# Patient Record
Sex: FEMALE | Race: WHITE | NOT HISPANIC OR LATINO | Employment: FULL TIME | URBAN - METROPOLITAN AREA
[De-identification: names, ages, dates, MRNs, and addresses within clinical notes are randomized per-mention and may not be internally consistent; named-entity substitution may affect disease eponyms.]

---

## 2017-02-09 ENCOUNTER — COMMUNICATION - HEALTHEAST (OUTPATIENT)
Dept: FAMILY MEDICINE | Facility: CLINIC | Age: 47
End: 2017-02-09

## 2017-03-17 ENCOUNTER — OFFICE VISIT - HEALTHEAST (OUTPATIENT)
Dept: FAMILY MEDICINE | Facility: CLINIC | Age: 47
End: 2017-03-17

## 2017-03-17 DIAGNOSIS — Z13.228 SCREENING FOR METABOLIC DISORDER: ICD-10-CM

## 2017-03-17 DIAGNOSIS — Z12.4 CERVICAL CANCER SCREENING: ICD-10-CM

## 2017-03-17 DIAGNOSIS — Z00.00 ROUTINE GENERAL MEDICAL EXAMINATION AT A HEALTH CARE FACILITY: ICD-10-CM

## 2017-03-17 DIAGNOSIS — Z13.220 SCREENING FOR HYPERLIPIDEMIA: ICD-10-CM

## 2017-03-17 DIAGNOSIS — Z12.31 VISIT FOR SCREENING MAMMOGRAM: ICD-10-CM

## 2017-03-17 DIAGNOSIS — E55.9 VITAMIN D DEFICIENCY: ICD-10-CM

## 2017-03-17 DIAGNOSIS — R21 RASH: ICD-10-CM

## 2017-03-17 DIAGNOSIS — Z13.0 SCREENING FOR DEFICIENCY ANEMIA: ICD-10-CM

## 2017-03-17 LAB
CHOLEST SERPL-MCNC: 191 MG/DL
FASTING STATUS PATIENT QL REPORTED: YES
HDLC SERPL-MCNC: 52 MG/DL
LDLC SERPL CALC-MCNC: 124 MG/DL
TRIGL SERPL-MCNC: 74 MG/DL

## 2017-03-17 ASSESSMENT — MIFFLIN-ST. JEOR: SCORE: 1261.69

## 2017-03-23 LAB
BKR LAB AP ABNORMAL BLEEDING: NO
BKR LAB AP BIRTH CONTROL/HORMONES: NORMAL
BKR LAB AP CERVICAL APPEARANCE: NORMAL
BKR LAB AP GYN ADEQUACY: NORMAL
BKR LAB AP GYN INTERPRETATION: NORMAL
BKR LAB AP HPV REFLEX: NORMAL
BKR LAB AP LMP: NORMAL
BKR LAB AP PATIENT STATUS: NORMAL
BKR LAB AP PREVIOUS ABNORMAL: NO
BKR LAB AP PREVIOUS NORMAL: 2016
HIGH RISK?: NO
HPV INTERPRETATION - HISTORICAL: NORMAL
HPV INTERPRETER - HISTORICAL: NORMAL
PATH REPORT.COMMENTS IMP SPEC: NORMAL
RESULT FLAG (HE HISTORICAL CONVERSION): NORMAL

## 2017-03-24 ENCOUNTER — COMMUNICATION - HEALTHEAST (OUTPATIENT)
Dept: FAMILY MEDICINE | Facility: CLINIC | Age: 47
End: 2017-03-24

## 2017-03-27 ENCOUNTER — HOSPITAL ENCOUNTER (OUTPATIENT)
Dept: MAMMOGRAPHY | Facility: CLINIC | Age: 47
Discharge: HOME | End: 2017-03-27
Attending: FAMILY MEDICINE

## 2017-03-27 ENCOUNTER — RECORDS - HEALTHEAST (OUTPATIENT)
Dept: ADMINISTRATIVE | Facility: OTHER | Age: 47
End: 2017-03-27

## 2017-03-27 DIAGNOSIS — Z12.31 VISIT FOR SCREENING MAMMOGRAM: ICD-10-CM

## 2017-04-24 ENCOUNTER — COMMUNICATION - HEALTHEAST (OUTPATIENT)
Dept: MAMMOGRAPHY | Facility: CLINIC | Age: 47
End: 2017-04-24

## 2017-04-28 ENCOUNTER — HOSPITAL ENCOUNTER (OUTPATIENT)
Dept: MAMMOGRAPHY | Facility: CLINIC | Age: 47
Discharge: HOME | End: 2017-04-28
Attending: FAMILY MEDICINE

## 2017-04-28 DIAGNOSIS — N64.89 BREAST ASYMMETRY: ICD-10-CM

## 2017-05-19 ENCOUNTER — COMMUNICATION - HEALTHEAST (OUTPATIENT)
Dept: FAMILY MEDICINE | Facility: CLINIC | Age: 47
End: 2017-05-19

## 2017-05-19 DIAGNOSIS — F41.9 ANXIETY: ICD-10-CM

## 2017-06-01 ENCOUNTER — COMMUNICATION - HEALTHEAST (OUTPATIENT)
Dept: MAMMOGRAPHY | Facility: CLINIC | Age: 47
End: 2017-06-01

## 2017-06-01 DIAGNOSIS — F34.1 DYSTHYMIC DISORDER: ICD-10-CM

## 2017-06-04 ENCOUNTER — COMMUNICATION - HEALTHEAST (OUTPATIENT)
Dept: FAMILY MEDICINE | Facility: CLINIC | Age: 47
End: 2017-06-04

## 2017-06-04 DIAGNOSIS — F34.1 DYSTHYMIC DISORDER: ICD-10-CM

## 2017-10-26 ENCOUNTER — COMMUNICATION - HEALTHEAST (OUTPATIENT)
Dept: TELEHEALTH | Facility: CLINIC | Age: 47
End: 2017-10-26

## 2017-10-26 ENCOUNTER — COMMUNICATION - HEALTHEAST (OUTPATIENT)
Dept: HEALTH INFORMATION MANAGEMENT | Facility: CLINIC | Age: 47
End: 2017-10-26

## 2017-10-31 ENCOUNTER — COMMUNICATION - HEALTHEAST (OUTPATIENT)
Dept: TELEHEALTH | Facility: CLINIC | Age: 47
End: 2017-10-31

## 2017-11-03 ENCOUNTER — COMMUNICATION - HEALTHEAST (OUTPATIENT)
Dept: TELEHEALTH | Facility: CLINIC | Age: 47
End: 2017-11-03

## 2017-11-03 ENCOUNTER — COMMUNICATION - HEALTHEAST (OUTPATIENT)
Dept: HEALTH INFORMATION MANAGEMENT | Facility: CLINIC | Age: 47
End: 2017-11-03

## 2018-06-05 ENCOUNTER — APPOINTMENT (OUTPATIENT)
Dept: GENERAL RADIOLOGY | Facility: CLINIC | Age: 48
End: 2018-06-05
Attending: PHYSICIAN ASSISTANT
Payer: COMMERCIAL

## 2018-06-05 ENCOUNTER — RECORDS - HEALTHEAST (OUTPATIENT)
Dept: ADMINISTRATIVE | Facility: OTHER | Age: 48
End: 2018-06-05

## 2018-06-05 ENCOUNTER — HOSPITAL ENCOUNTER (EMERGENCY)
Facility: CLINIC | Age: 48
Discharge: HOME | End: 2018-06-05
Admitting: EMERGENCY MEDICINE
Payer: COMMERCIAL

## 2018-06-05 VITALS
HEART RATE: 70 BPM | TEMPERATURE: 97.8 F | OXYGEN SATURATION: 98 % | RESPIRATION RATE: 14 BRPM | HEIGHT: 62 IN | WEIGHT: 145 LBS | DIASTOLIC BLOOD PRESSURE: 99 MMHG | SYSTOLIC BLOOD PRESSURE: 157 MMHG | BODY MASS INDEX: 26.68 KG/M2

## 2018-06-05 DIAGNOSIS — S62.631A CLOSED DISPLACED FRACTURE OF DISTAL PHALANX OF LEFT INDEX FINGER, INITIAL ENCOUNTER: ICD-10-CM

## 2018-06-05 PROCEDURE — 26750 TREAT FINGER FRACTURE EACH: CPT | Mod: F1

## 2018-06-05 PROCEDURE — 25000132 ZZH RX MED GY IP 250 OP 250 PS 637: Performed by: PHYSICIAN ASSISTANT

## 2018-06-05 PROCEDURE — 99284 EMERGENCY DEPT VISIT MOD MDM: CPT | Mod: 25

## 2018-06-05 PROCEDURE — 73140 X-RAY EXAM OF FINGER(S): CPT | Mod: LT

## 2018-06-05 RX ORDER — IBUPROFEN 600 MG/1
600 TABLET, FILM COATED ORAL ONCE
Status: COMPLETED | OUTPATIENT
Start: 2018-06-05 | End: 2018-06-05

## 2018-06-05 RX ADMIN — IBUPROFEN 600 MG: 600 TABLET ORAL at 22:53

## 2018-06-05 ASSESSMENT — ENCOUNTER SYMPTOMS
JOINT SWELLING: 1
ARTHRALGIAS: 1

## 2018-06-05 NOTE — ED AVS SNAPSHOT
Emergency Department    [address] [place] [address]    Phone:  [phone]    Fax:  [phone]                                       Adela Stone   MRN: [id number]    Department:   Emergency Department   Date of Visit:  6/5/2018           After Visit Summary Signature Page     I have received my discharge instructions, and my questions have been answered. I have discussed any challenges I see with this plan with the nurse or doctor.    ..........................................................................................................................................  Patient/Patient Representative Signature      ..........................................................................................................................................  Patient Representative Print Name and Relationship to Patient    ..................................................               ................................................  Date                                            Time    ..........................................................................................................................................  Reviewed by Signature/Title    ...................................................              ..............................................  Date                                                            Time

## 2018-06-05 NOTE — ED AVS SNAPSHOT
Emergency Department    [address] [facility] [address]    Phone:  [phone]    Fax:  [phone]                                       Adela Stone   MRN: [id number]    Department:   Emergency Department   Date of Visit:  6/5/2018           Patient Information     Date Of Birth          1970        Your diagnoses for this visit were:     Closed displaced fracture of distal phalanx of left index finger, initial encounter       Follow-up Information     Follow up with Primary care provider  In 2 weeks.        Follow up with  Emergency Department.    Specialty:  EMERGENCY MEDICINE    Why:  As needed, If symptoms worsen    Contact information:    [address] [facility] [address] [phone]        Discharge Instructions       *Follow up with primary care provider in 2 weeks.   *Wear finger immobilizer until follow up with primary.       Finger or Toe Fractures (Broken Finger or Toe)  A hard blow can break a bone in your toe or finger. Broken bones are also known as fractures. Even minor fractures need medical care. Without treatment, they may heal crooked, remain stiff, or develop other problems.    When to go to the Emergency Room (ER)  You may not always know when you have a fractured toe or finger. Apply ice to the injury right away. Then, seek medical care if:    Your finger or toe is swollen or very painful.    You cannot move your finger or toe normally.    Your injured toe or finger is pale or blue.    You are bleeding.    A bone protrudes through your skin.  What to expect in the ER  A healthcare provider will ask about your injury and examine your toe or finger. You may have X-rays. Treatment will depend on the type of fracture you have.  Toe fracture  Your healthcare provider may straighten a broken toe. You'll be given local anesthesia so you won't feel any discomfort during this procedure. Your injured toe may then be splinted by being taped to a  toe next to it, or placed on a pad that's taped to your foot. Your healthcare provider may also ask you to apply ice and keep your foot elevated.  Finger fracture  Your healthcare provider may straighten a broken finger. A broken finger is likely to be placed in a metal splint. This helps straighten and protect the finger while it heals. Your healthcare provider may give you exercises to do as your injury heals, to prevent stiffness in your finger.  Date Last Reviewed: 9/28/2015 2000-2017 [employer]. [address] [place] [address]. All rights reserved. This information is not intended as a substitute for professional medical care. Always follow your healthcare professional's instructions.          24 Hour Appointment Hotline       To make an appointment at any [facility], call [phone] ([phone]). If you don't have a family doctor or clinic, we will help you find one. [facility] are conveniently located to serve the needs of you and your family.             Review of your medicines      Our records show that you are taking the medicines listed below. If these are incorrect, please call your family doctor or clinic.        Dose / Directions Last dose taken    CITALOPRAM HYDROBROMIDE PO        Refills:  0                Procedures and tests performed during your visit     Fingers XR, 2-3 views, left      Orders Needing Specimen Collection     None      Pending Results     No orders found from 6/3/2018 to 6/6/2018.            Pending Culture Results     No orders found from 6/3/2018 to 6/6/2018.            Pending Results Instructions     If you had any lab results that were not finalized at the time of your Discharge, you can call the ED Lab Result RN at [phone]. You will be contacted by this team for any positive Lab results or changes in treatment. The nurses are available 7 days a week from 10A to 6:30P.  You can leave a message 24 hours per day and they will  return your call.        Test Results From Your Hospital Stay        6/5/2018 10:40 PM      Narrative     FINGER LEFT TWO OR MORE VIEWS    6/5/2018 10:19 PM     HISTORY: Pain,     COMPARISON: None.    FINDINGS: Minimally displaced fracture of the base of the distal  phalanx of the left second finger.        Impression     IMPRESSION: Distal phalanx fracture left second finger.    ANGELA COPE MD                Clinical Quality Measure: Blood Pressure Screening     Your blood pressure was checked while you were in the emergency department today. The last reading we obtained was  BP: (!) 157/99 . Please read the guidelines below about what these numbers mean and what you should do about them.  If your systolic blood pressure (the top number) is less than 120 and your diastolic blood pressure (the bottom number) is less than 80, then your blood pressure is normal. There is nothing more that you need to do about it.  If your systolic blood pressure (the top number) is 120-139 or your diastolic blood pressure (the bottom number) is 80-89, your blood pressure may be higher than it should be. You should have your blood pressure rechecked within a year by a primary care provider.  If your systolic blood pressure (the top number) is 140 or greater or your diastolic blood pressure (the bottom number) is 90 or greater, you may have high blood pressure. High blood pressure is treatable, but if left untreated over time it can put you at risk for heart attack, stroke, or kidney failure. You should have your blood pressure rechecked by a primary care provider within the next 4 weeks.  If your provider in the emergency department today gave you specific instructions to follow-up with your doctor or provider even sooner than that, you should follow that instruction and not wait for up to 4 weeks for your follow-up visit.        Thank you for choosing [facility]       Thank you for choosing [facility] for your care. Our goal is  "always to provide you with excellent care. Hearing back from our patients is one way we can continue to improve our services. Please take a few minutes to complete the written survey that you may receive in the mail after you visit with us. Thank you!        Spreadknowledge Information     Spreadknowledge lets you send messages to your doctor, view your test results, renew your prescriptions, schedule appointments and more. To sign up, go to [email or web address] . Click on \"Log in\" on the left side of the screen, which will take you to the Welcome page. Then click on \"Sign up Now\" on the right side of the page.     You will be asked to enter the access code listed below, as well as some personal information. Please follow the directions to create your username and password.     Your access code is: [email or web address]  Expires: 9/3/2018 11:03 PM     Your access code will  in 90 days. If you need help or a new code, please call your [facility] clinic or [phone].        Care EveryWhere ID     This is your Care EveryWhere ID. This could be used by other organizations to access your [facility] medical records  [id number]        Equal Access to Services     JESSICA HOWARD : Hadii maki Beavers, wamarcellada winston, qaweston kaalmacarmina ag, stephie gresham. So [facility] [phone].    ATENCIÓN: Si habla español, tiene a yarbrough disposición servicios gratuitos de asistencia lingüística. Martha al [phone].    We comply with applicable federal civil rights laws and [place] laws. We do not discriminate on the basis of race, color, national origin, age, disability, sex, sexual orientation, or gender identity.            After Visit Summary       This is your record. Keep this with you and show to your community pharmacist(s) and doctor(s) at your next visit.                  "

## 2018-06-06 NOTE — ED PROVIDER NOTES
"  History     Chief Complaint:  Left hand injury    HPI   Adela Stone is a 48 year old female who presents with an injury to her left index finger. The patient states that she was playing catch with her son when the ball collided directly with the tip of her left index finger. The patient states that when she attempted to bend her finger she felt a pop and that her finger appears slightly crooked. The patient has difficulty bending at the finger tip and notes some swelling. She otherwise denies numbness and has limited range of motion due to swelling.    Allergies:  No known drug allergies     Medications:    Citalopram hydrobromide     Past Medical History:    The patient does not have any past pertinent medical history.    Past Surgical History:    History reviewed. No pertinent surgical history.    Family History:    History reviewed. No pertinent family history.     Social History:  Smoking Status: Never smoker  Alcohol Use: No  Patient presents on her own.    Review of Systems   Musculoskeletal: Positive for arthralgias and joint swelling.   All other systems reviewed and are negative.    Physical Exam   Patient Vitals for the past 24 hrs:   BP Temp Temp src Pulse Heart Rate Resp SpO2 Height Weight   06/05/18 2134 (!) 157/99 97.8  F (36.6  C) Oral 70 70 14 98 % 1.575 m (5' 2\") 65.8 kg (145 lb)       Physical Exam  Constitutional: Alert, attentive, GCS 15   CV: 2+ radial pulse, brisk distal cap refill to left index finger.   MSK:  Able to flex/extend PIP joint of left index finger, unable to flex/extend DIP joint secondary to pain. Localized tenderness overlying the DIP joint   Neurological: Alert, attentive. 5/5 strength to PIP joint, unable to assess DIP joint secondary to pain. Sensation intact to distal fingertip.   Skin: Skin is warm and dry. No open wound.,     Emergency Department Course     Imaging:  Radiographic findings were communicated with the patient who voiced understanding of the " findings.    X-ray Left fingers, 2-3 views:  Distal phalanx fracture left second finger.  Result per radiology.      Interventions:  2253 - Ibuprofen 600 mg PO    Emergency Department Course:  Past medical records, nursing notes, and vitals reviewed.  2139: I performed an exam of the patient and obtained history, as documented above.     2208: I discussed the patient with Dr. Bah, in shared service, who also evaluated the patient.     2303:  I rechecked the patient. Findings and plan explained to the Patient. Patient discharged home with instructions regarding supportive care, medications, and reasons to return. The importance of close follow-up was reviewed.      Impression & Plan      Medical Decision Making:  Adela Stone is a 48 year old female who presents for evaluation of left pointer finger pain after she was attempting to catch a baseball and this collided directly with the finger. She had pain, swelling, and apparent deformity to the finger and comes here. CMS is intact distally in the extremity.  Xrays reveal a minimally displaced fracture of the distal phalanx that does not need reduction at this time. I therefore staffed the patient in shared service with Dr. Bah who agreed with the plan for aluma-foam splint and follow up with her primary and/or orthopedics.  There is no indication for ortho consultation from the ED. Rather, close follow-up is indicated in the next 2 weeks. All questions/concerns addressed prior to discharge home.     Diagnosis:    ICD-10-CM    1. Closed displaced fracture of distal phalanx of left index finger, initial encounter S62.631A        Renay Gomes  6/5/2018    EMERGENCY DEPARTMENT  I, Renay Gomes, am serving as a scribe at 9:39 PM on 6/5/2018 to document services personally performed by Dyana Zuniga PA-C based on my observations and the provider's statements to me.       Dyana Zuniga PA-C  06/05/18 1920

## 2018-06-06 NOTE — DISCHARGE INSTRUCTIONS
*Follow up with primary care provider in 2 weeks.   *Wear finger immobilizer until follow up with primary.       Finger or Toe Fractures (Broken Finger or Toe)  A hard blow can break a bone in your toe or finger. Broken bones are also known as fractures. Even minor fractures need medical care. Without treatment, they may heal crooked, remain stiff, or develop other problems.    When to go to the Emergency Room (ER)  You may not always know when you have a fractured toe or finger. Apply ice to the injury right away. Then, seek medical care if:    Your finger or toe is swollen or very painful.    You cannot move your finger or toe normally.    Your injured toe or finger is pale or blue.    You are bleeding.    A bone protrudes through your skin.  What to expect in the ER  A healthcare provider will ask about your injury and examine your toe or finger. You may have X-rays. Treatment will depend on the type of fracture you have.  Toe fracture  Your healthcare provider may straighten a broken toe. You'll be given local anesthesia so you won't feel any discomfort during this procedure. Your injured toe may then be splinted by being taped to a toe next to it, or placed on a pad that's taped to your foot. Your healthcare provider may also ask you to apply ice and keep your foot elevated.  Finger fracture  Your healthcare provider may straighten a broken finger. A broken finger is likely to be placed in a metal splint. This helps straighten and protect the finger while it heals. Your healthcare provider may give you exercises to do as your injury heals, to prevent stiffness in your finger.  Date Last Reviewed: 9/28/2015 2000-2017 [employer]. [address] [place] [address], [place] [address]. All rights reserved. This information is not intended as a substitute for professional medical care. Always follow your healthcare professional's instructions.

## 2018-06-06 NOTE — ED PROVIDER NOTES
Emergency Department Attending Supervision Note  6/5/2018  11:00 PM      I evaluated this patient in conjunction with CHRISTINE Clark    On exam, patient has pain and tenderness to the base of the left index finger. No bruising, no open wounds.     Patient was seen in association with CHRISTINE Clark. Patient presents with finger pain after direct trauma with a softball. X-ray shows fracture as noted. Patient has limited range of motion secondary to pain. Patient was placed in splint and discussed follow up.     Diagnosis    ICD-10-CM    1. Closed displaced fracture of distal phalanx of left index finger, initial encounter S62.631A          Fly Bah MD

## 2018-06-07 ENCOUNTER — COMMUNICATION - HEALTHEAST (OUTPATIENT)
Dept: FAMILY MEDICINE | Facility: CLINIC | Age: 48
End: 2018-06-07

## 2018-06-07 DIAGNOSIS — F34.1 DYSTHYMIC DISORDER: ICD-10-CM

## 2018-06-21 ENCOUNTER — OFFICE VISIT - HEALTHEAST (OUTPATIENT)
Dept: FAMILY MEDICINE | Facility: CLINIC | Age: 48
End: 2018-06-21

## 2018-06-21 ENCOUNTER — RECORDS - HEALTHEAST (OUTPATIENT)
Dept: GENERAL RADIOLOGY | Facility: CLINIC | Age: 48
End: 2018-06-21

## 2018-06-21 DIAGNOSIS — S62.609A FRACTURE OF UNSPECIFIED PHALANX OF UNSPECIFIED FINGER, INITIAL ENCOUNTER FOR CLOSED FRACTURE: ICD-10-CM

## 2018-06-21 DIAGNOSIS — F41.9 ANXIETY: ICD-10-CM

## 2018-06-21 DIAGNOSIS — S62.631G: ICD-10-CM

## 2018-06-21 DIAGNOSIS — Z12.31 VISIT FOR SCREENING MAMMOGRAM: ICD-10-CM

## 2018-06-21 ASSESSMENT — MIFFLIN-ST. JEOR: SCORE: 1256.69

## 2018-06-25 ENCOUNTER — RECORDS - HEALTHEAST (OUTPATIENT)
Dept: ADMINISTRATIVE | Facility: OTHER | Age: 48
End: 2018-06-25

## 2018-06-26 ENCOUNTER — RECORDS - HEALTHEAST (OUTPATIENT)
Dept: ADMINISTRATIVE | Facility: OTHER | Age: 48
End: 2018-06-26

## 2018-06-29 ENCOUNTER — HOSPITAL ENCOUNTER (OUTPATIENT)
Dept: MAMMOGRAPHY | Facility: CLINIC | Age: 48
Discharge: HOME | End: 2018-06-29
Attending: FAMILY MEDICINE

## 2018-06-29 DIAGNOSIS — Z12.31 VISIT FOR SCREENING MAMMOGRAM: ICD-10-CM

## 2018-07-06 ENCOUNTER — RECORDS - HEALTHEAST (OUTPATIENT)
Dept: ADMINISTRATIVE | Facility: OTHER | Age: 48
End: 2018-07-06

## 2018-07-20 ENCOUNTER — RECORDS - HEALTHEAST (OUTPATIENT)
Dept: ADMINISTRATIVE | Facility: OTHER | Age: 48
End: 2018-07-20

## 2018-07-30 ENCOUNTER — RECORDS - HEALTHEAST (OUTPATIENT)
Dept: ADMINISTRATIVE | Facility: OTHER | Age: 48
End: 2018-07-30
Payer: COMMERCIAL

## 2018-07-30 ENCOUNTER — OFFICE VISIT - HEALTHEAST (OUTPATIENT)
Dept: FAMILY MEDICINE | Facility: CLINIC | Age: 48
End: 2018-07-30

## 2018-07-30 DIAGNOSIS — Z13.228 SCREENING FOR METABOLIC DISORDER: ICD-10-CM

## 2018-07-30 DIAGNOSIS — Z12.4 SCREENING FOR CERVICAL CANCER: ICD-10-CM

## 2018-07-30 DIAGNOSIS — E55.9 VITAMIN D DEFICIENCY: ICD-10-CM

## 2018-07-30 DIAGNOSIS — Z00.00 ROUTINE GENERAL MEDICAL EXAMINATION AT A HEALTH CARE FACILITY: ICD-10-CM

## 2018-07-30 DIAGNOSIS — F41.1 GENERALIZED ANXIETY DISORDER: ICD-10-CM

## 2018-07-30 LAB
ANION GAP SERPL CALCULATED.3IONS-SCNC: 9 MMOL/L (ref 5–18)
BUN SERPL-MCNC: 13 MG/DL (ref 8–22)
CALCIUM SERPL-MCNC: 9.4 MG/DL (ref 8.5–10.5)
CHLORIDE BLD-SCNC: 105 MMOL/L (ref 98–107)
CHOLEST SERPL-MCNC: 199 MG/DL
CO2 SERPL-SCNC: 25 MMOL/L (ref 22–31)
CREAT SERPL-MCNC: 0.76 MG/DL (ref 0.6–1.1)
FASTING STATUS PATIENT QL REPORTED: YES
GFR SERPL CREATININE-BSD FRML MDRD: >60 ML/MIN/1.73M2
GLUCOSE BLD-MCNC: 95 MG/DL (ref 70–125)
HDLC SERPL-MCNC: 60 MG/DL
LDLC SERPL CALC-MCNC: 127 MG/DL
POTASSIUM BLD-SCNC: 4.5 MMOL/L (ref 3.5–5)
SODIUM SERPL-SCNC: 139 MMOL/L (ref 136–145)
TRIGL SERPL-MCNC: 58 MG/DL
TSH SERPL DL<=0.005 MIU/L-ACNC: 1.32 UIU/ML (ref 0.3–5)

## 2018-07-30 ASSESSMENT — MIFFLIN-ST. JEOR: SCORE: 1261.22

## 2018-07-31 ENCOUNTER — COMMUNICATION - HEALTHEAST (OUTPATIENT)
Dept: FAMILY MEDICINE | Facility: CLINIC | Age: 48
End: 2018-07-31

## 2018-07-31 LAB
25(OH)D3 SERPL-MCNC: 24 NG/ML (ref 30–80)
HPV SOURCE: NORMAL
HUMAN PAPILLOMA VIRUS 16 DNA: NEGATIVE
HUMAN PAPILLOMA VIRUS 18 DNA: NEGATIVE
HUMAN PAPILLOMA VIRUS FINAL DIAGNOSIS: NORMAL
HUMAN PAPILLOMA VIRUS OTHER HR: NEGATIVE
SPECIMEN DESCRIPTION: NORMAL

## 2018-08-12 ENCOUNTER — RECORDS - HEALTHEAST (OUTPATIENT)
Dept: ADMINISTRATIVE | Facility: OTHER | Age: 48
End: 2018-08-12

## 2018-08-13 ENCOUNTER — RECORDS - HEALTHEAST (OUTPATIENT)
Dept: ADMINISTRATIVE | Facility: OTHER | Age: 48
End: 2018-08-13

## 2018-08-15 ENCOUNTER — COMMUNICATION - HEALTHEAST (OUTPATIENT)
Dept: FAMILY MEDICINE | Facility: CLINIC | Age: 48
End: 2018-08-15

## 2018-09-02 ENCOUNTER — COMMUNICATION - HEALTHEAST (OUTPATIENT)
Dept: FAMILY MEDICINE | Facility: CLINIC | Age: 48
End: 2018-09-02

## 2018-09-02 DIAGNOSIS — F41.1 GENERALIZED ANXIETY DISORDER: ICD-10-CM

## 2018-09-10 ENCOUNTER — RECORDS - HEALTHEAST (OUTPATIENT)
Dept: ADMINISTRATIVE | Facility: OTHER | Age: 48
End: 2018-09-10

## 2018-10-01 ENCOUNTER — COMMUNICATION - HEALTHEAST (OUTPATIENT)
Dept: FAMILY MEDICINE | Facility: CLINIC | Age: 48
End: 2018-10-01

## 2018-10-01 DIAGNOSIS — F41.1 GENERALIZED ANXIETY DISORDER: ICD-10-CM

## 2018-10-10 ENCOUNTER — COMMUNICATION - HEALTHEAST (OUTPATIENT)
Dept: FAMILY MEDICINE | Facility: CLINIC | Age: 48
End: 2018-10-10

## 2018-10-10 DIAGNOSIS — F41.9 ANXIETY: ICD-10-CM

## 2018-10-16 ENCOUNTER — COMMUNICATION - HEALTHEAST (OUTPATIENT)
Dept: HEALTH INFORMATION MANAGEMENT | Facility: CLINIC | Age: 48
End: 2018-10-16

## 2018-10-16 ENCOUNTER — COMMUNICATION - HEALTHEAST (OUTPATIENT)
Dept: TELEHEALTH | Facility: CLINIC | Age: 48
End: 2018-10-16

## 2019-03-18 ENCOUNTER — OFFICE VISIT - HEALTHEAST (OUTPATIENT)
Dept: FAMILY MEDICINE | Facility: CLINIC | Age: 49
End: 2019-03-18

## 2019-03-18 DIAGNOSIS — F41.1 GENERALIZED ANXIETY DISORDER: ICD-10-CM

## 2019-03-18 ASSESSMENT — MIFFLIN-ST. JEOR: SCORE: 1279.37

## 2019-04-15 ENCOUNTER — COMMUNICATION - HEALTHEAST (OUTPATIENT)
Dept: FAMILY MEDICINE | Facility: CLINIC | Age: 49
End: 2019-04-15

## 2019-04-25 ENCOUNTER — COMMUNICATION - HEALTHEAST (OUTPATIENT)
Dept: FAMILY MEDICINE | Facility: CLINIC | Age: 49
End: 2019-04-25

## 2019-06-12 ENCOUNTER — RECORDS - HEALTHEAST (OUTPATIENT)
Dept: GENERAL RADIOLOGY | Facility: CLINIC | Age: 49
End: 2019-06-12

## 2019-06-12 ENCOUNTER — OFFICE VISIT - HEALTHEAST (OUTPATIENT)
Dept: FAMILY MEDICINE | Facility: CLINIC | Age: 49
End: 2019-06-12

## 2019-06-12 DIAGNOSIS — M79.622 PAIN OF LEFT UPPER ARM: ICD-10-CM

## 2019-06-12 DIAGNOSIS — M79.622 PAIN IN LEFT UPPER ARM: ICD-10-CM

## 2019-06-12 ASSESSMENT — MIFFLIN-ST. JEOR: SCORE: 1283.9

## 2019-06-14 ENCOUNTER — COMMUNICATION - HEALTHEAST (OUTPATIENT)
Dept: FAMILY MEDICINE | Facility: CLINIC | Age: 49
End: 2019-06-14

## 2019-06-14 ENCOUNTER — AMBULATORY - HEALTHEAST (OUTPATIENT)
Dept: FAMILY MEDICINE | Facility: CLINIC | Age: 49
End: 2019-06-14

## 2019-06-14 DIAGNOSIS — M79.622 PAIN OF LEFT UPPER ARM: ICD-10-CM

## 2019-06-21 ENCOUNTER — OFFICE VISIT - HEALTHEAST (OUTPATIENT)
Dept: PHYSICAL THERAPY | Facility: REHABILITATION | Age: 49
End: 2019-06-21

## 2019-06-21 DIAGNOSIS — R29.3 ABNORMAL POSTURE: ICD-10-CM

## 2019-06-21 DIAGNOSIS — M54.12 LEFT CERVICAL RADICULOPATHY: ICD-10-CM

## 2019-06-21 DIAGNOSIS — R29.898 DECREASED ROM OF NECK: ICD-10-CM

## 2019-06-26 ENCOUNTER — OFFICE VISIT - HEALTHEAST (OUTPATIENT)
Dept: PHYSICAL THERAPY | Facility: REHABILITATION | Age: 49
End: 2019-06-26

## 2019-06-26 DIAGNOSIS — M54.12 LEFT CERVICAL RADICULOPATHY: ICD-10-CM

## 2019-06-26 DIAGNOSIS — R29.898 DECREASED ROM OF NECK: ICD-10-CM

## 2019-06-26 DIAGNOSIS — R29.3 ABNORMAL POSTURE: ICD-10-CM

## 2019-07-03 ENCOUNTER — OFFICE VISIT - HEALTHEAST (OUTPATIENT)
Dept: PHYSICAL THERAPY | Facility: REHABILITATION | Age: 49
End: 2019-07-03

## 2019-07-03 DIAGNOSIS — R29.3 ABNORMAL POSTURE: ICD-10-CM

## 2019-07-03 DIAGNOSIS — M54.12 LEFT CERVICAL RADICULOPATHY: ICD-10-CM

## 2019-07-03 DIAGNOSIS — R29.898 DECREASED ROM OF NECK: ICD-10-CM

## 2019-07-16 ENCOUNTER — COMMUNICATION - HEALTHEAST (OUTPATIENT)
Dept: FAMILY MEDICINE | Facility: CLINIC | Age: 49
End: 2019-07-16

## 2019-07-16 DIAGNOSIS — F41.9 ANXIETY: ICD-10-CM

## 2019-09-09 ENCOUNTER — COMMUNICATION - HEALTHEAST (OUTPATIENT)
Dept: FAMILY MEDICINE | Facility: CLINIC | Age: 49
End: 2019-09-09

## 2019-09-09 ENCOUNTER — OFFICE VISIT - HEALTHEAST (OUTPATIENT)
Dept: FAMILY MEDICINE | Facility: CLINIC | Age: 49
End: 2019-09-09

## 2019-09-09 DIAGNOSIS — Z00.00 ROUTINE GENERAL MEDICAL EXAMINATION AT A HEALTH CARE FACILITY: ICD-10-CM

## 2019-09-09 DIAGNOSIS — Z12.31 ENCOUNTER FOR SCREENING MAMMOGRAM FOR BREAST CANCER: ICD-10-CM

## 2019-09-09 DIAGNOSIS — Z12.11 SCREEN FOR COLON CANCER: ICD-10-CM

## 2019-09-09 DIAGNOSIS — Z13.228 ENCOUNTER FOR SCREENING FOR OTHER METABOLIC DISORDERS: ICD-10-CM

## 2019-09-09 DIAGNOSIS — F41.9 ANXIETY: ICD-10-CM

## 2019-09-09 LAB
ANION GAP SERPL CALCULATED.3IONS-SCNC: 6 MMOL/L (ref 5–18)
BUN SERPL-MCNC: 14 MG/DL (ref 8–22)
CALCIUM SERPL-MCNC: 9.3 MG/DL (ref 8.5–10.5)
CHLORIDE BLD-SCNC: 106 MMOL/L (ref 98–107)
CHOLEST SERPL-MCNC: 181 MG/DL
CO2 SERPL-SCNC: 28 MMOL/L (ref 22–31)
CREAT SERPL-MCNC: 0.76 MG/DL (ref 0.6–1.1)
FASTING STATUS PATIENT QL REPORTED: YES
GFR SERPL CREATININE-BSD FRML MDRD: >60 ML/MIN/1.73M2
GLUCOSE BLD-MCNC: 95 MG/DL (ref 70–125)
HDLC SERPL-MCNC: 58 MG/DL
LDLC SERPL CALC-MCNC: 110 MG/DL
POTASSIUM BLD-SCNC: 4.7 MMOL/L (ref 3.5–5)
SODIUM SERPL-SCNC: 140 MMOL/L (ref 136–145)
TRIGL SERPL-MCNC: 66 MG/DL

## 2019-09-09 ASSESSMENT — MIFFLIN-ST. JEOR: SCORE: 1269.39

## 2019-09-11 ENCOUNTER — COMMUNICATION - HEALTHEAST (OUTPATIENT)
Dept: FAMILY MEDICINE | Facility: CLINIC | Age: 49
End: 2019-09-11

## 2019-09-20 ENCOUNTER — COMMUNICATION - HEALTHEAST (OUTPATIENT)
Dept: FAMILY MEDICINE | Facility: CLINIC | Age: 49
End: 2019-09-20

## 2019-09-30 ENCOUNTER — COMMUNICATION - HEALTHEAST (OUTPATIENT)
Dept: FAMILY MEDICINE | Facility: CLINIC | Age: 49
End: 2019-09-30

## 2019-10-16 ENCOUNTER — COMMUNICATION - HEALTHEAST (OUTPATIENT)
Dept: HEALTH INFORMATION MANAGEMENT | Facility: CLINIC | Age: 49
End: 2019-10-16

## 2019-10-22 ENCOUNTER — HOSPITAL ENCOUNTER (EMERGENCY)
Facility: CLINIC | Age: 49
Discharge: HOME | End: 2019-10-22
Attending: EMERGENCY MEDICINE | Admitting: EMERGENCY MEDICINE
Payer: COMMERCIAL

## 2019-10-22 ENCOUNTER — RECORDS - HEALTHEAST (OUTPATIENT)
Dept: ADMINISTRATIVE | Facility: OTHER | Age: 49
End: 2019-10-22

## 2019-10-22 ENCOUNTER — APPOINTMENT (OUTPATIENT)
Dept: CARDIOLOGY | Facility: CLINIC | Age: 49
End: 2019-10-22
Attending: EMERGENCY MEDICINE
Payer: COMMERCIAL

## 2019-10-22 ENCOUNTER — COMMUNICATION - HEALTHEAST (OUTPATIENT)
Dept: SCHEDULING | Facility: CLINIC | Age: 49
End: 2019-10-22

## 2019-10-22 ENCOUNTER — APPOINTMENT (OUTPATIENT)
Dept: GENERAL RADIOLOGY | Facility: CLINIC | Age: 49
End: 2019-10-22
Attending: EMERGENCY MEDICINE
Payer: COMMERCIAL

## 2019-10-22 VITALS
TEMPERATURE: 98.9 F | DIASTOLIC BLOOD PRESSURE: 94 MMHG | SYSTOLIC BLOOD PRESSURE: 132 MMHG | HEART RATE: 78 BPM | OXYGEN SATURATION: 98 % | RESPIRATION RATE: 16 BRPM | BODY MASS INDEX: 29.26 KG/M2 | WEIGHT: 159 LBS | HEIGHT: 62 IN

## 2019-10-22 DIAGNOSIS — R00.2 PALPITATIONS: ICD-10-CM

## 2019-10-22 LAB
ALBUMIN SERPL-MCNC: 3.8 G/DL (ref 3.4–5)
ALP SERPL-CCNC: 64 U/L (ref 40–150)
ALT SERPL W P-5'-P-CCNC: 44 U/L (ref 0–50)
ANION GAP SERPL CALCULATED.3IONS-SCNC: 2 MMOL/L (ref 3–14)
AST SERPL W P-5'-P-CCNC: 21 U/L (ref 0–45)
BASOPHILS # BLD AUTO: 0 10E9/L (ref 0–0.2)
BASOPHILS NFR BLD AUTO: 0.3 %
BILIRUB SERPL-MCNC: 0.5 MG/DL (ref 0.2–1.3)
BUN SERPL-MCNC: 8 MG/DL (ref 7–30)
CALCIUM SERPL-MCNC: 9.2 MG/DL (ref 8.5–10.1)
CHLORIDE SERPL-SCNC: 109 MMOL/L (ref 94–109)
CO2 SERPL-SCNC: 28 MMOL/L (ref 20–32)
CREAT SERPL-MCNC: 0.69 MG/DL (ref 0.52–1.04)
DIFFERENTIAL METHOD BLD: NORMAL
EOSINOPHIL # BLD AUTO: 0.1 10E9/L (ref 0–0.7)
EOSINOPHIL NFR BLD AUTO: 1.2 %
ERYTHROCYTE [DISTWIDTH] IN BLOOD BY AUTOMATED COUNT: 12.8 % (ref 10–15)
GFR SERPL CREATININE-BSD FRML MDRD: >90 ML/MIN/{1.73_M2}
GLUCOSE SERPL-MCNC: 88 MG/DL (ref 70–99)
HCT VFR BLD AUTO: 40.1 % (ref 35–47)
HGB BLD-MCNC: 13.5 G/DL (ref 11.7–15.7)
IMM GRANULOCYTES # BLD: 0 10E9/L (ref 0–0.4)
IMM GRANULOCYTES NFR BLD: 0.2 %
INTERPRETATION ECG - MUSE: NORMAL
LYMPHOCYTES # BLD AUTO: 2 10E9/L (ref 0.8–5.3)
LYMPHOCYTES NFR BLD AUTO: 23.3 %
MCH RBC QN AUTO: 31 PG (ref 26.5–33)
MCHC RBC AUTO-ENTMCNC: 33.7 G/DL (ref 31.5–36.5)
MCV RBC AUTO: 92 FL (ref 78–100)
MONOCYTES # BLD AUTO: 0.6 10E9/L (ref 0–1.3)
MONOCYTES NFR BLD AUTO: 6.7 %
NEUTROPHILS # BLD AUTO: 5.9 10E9/L (ref 1.6–8.3)
NEUTROPHILS NFR BLD AUTO: 68.3 %
NRBC # BLD AUTO: 0 10*3/UL
NRBC BLD AUTO-RTO: 0 /100
PLATELET # BLD AUTO: 234 10E9/L (ref 150–450)
POTASSIUM SERPL-SCNC: 4.5 MMOL/L (ref 3.4–5.3)
PROT SERPL-MCNC: 7.7 G/DL (ref 6.8–8.8)
RBC # BLD AUTO: 4.36 10E12/L (ref 3.8–5.2)
SODIUM SERPL-SCNC: 139 MMOL/L (ref 133–144)
TROPONIN I SERPL-MCNC: <0.015 UG/L (ref 0–0.04)
TSH SERPL DL<=0.005 MIU/L-ACNC: 1.39 MU/L (ref 0.4–4)
WBC # BLD AUTO: 8.6 10E9/L (ref 4–11)

## 2019-10-22 PROCEDURE — 93227 XTRNL ECG REC<48 HR R&I: CPT | Performed by: INTERNAL MEDICINE

## 2019-10-22 PROCEDURE — 93005 ELECTROCARDIOGRAM TRACING: CPT | Mod: 59

## 2019-10-22 PROCEDURE — 71046 X-RAY EXAM CHEST 2 VIEWS: CPT

## 2019-10-22 PROCEDURE — 84443 ASSAY THYROID STIM HORMONE: CPT | Performed by: EMERGENCY MEDICINE

## 2019-10-22 PROCEDURE — 85025 COMPLETE CBC W/AUTO DIFF WBC: CPT | Performed by: EMERGENCY MEDICINE

## 2019-10-22 PROCEDURE — 93226 XTRNL ECG REC<48 HR SCAN A/R: CPT

## 2019-10-22 PROCEDURE — 99285 EMERGENCY DEPT VISIT HI MDM: CPT | Mod: 25

## 2019-10-22 PROCEDURE — 84484 ASSAY OF TROPONIN QUANT: CPT | Performed by: EMERGENCY MEDICINE

## 2019-10-22 PROCEDURE — 80053 COMPREHEN METABOLIC PANEL: CPT | Performed by: EMERGENCY MEDICINE

## 2019-10-22 RX ORDER — DESVENLAFAXINE 50 MG/1
50 TABLET, FILM COATED, EXTENDED RELEASE ORAL
COMMUNITY
Start: 2019-09-09 | End: 2022-02-16

## 2019-10-22 RX ORDER — LORAZEPAM 0.5 MG/1
0.5 TABLET ORAL
COMMUNITY
Start: 2019-09-09 | End: 2023-01-26

## 2019-10-22 ASSESSMENT — ENCOUNTER SYMPTOMS
NAUSEA: 1
DYSURIA: 0
COUGH: 0
FEVER: 0
NERVOUS/ANXIOUS: 1
HEMATURIA: 0

## 2019-10-22 ASSESSMENT — MIFFLIN-ST. JEOR: SCORE: 1294.47

## 2019-10-22 NOTE — ED PROVIDER NOTES
"  History     Chief Complaint:  Tachycardia and Anxiety      HPI   Adela Stone is a 49 year old female with a history of generalized anxiety disorder who presents with heightened anxiety. She reports that she has a history of panic attacks and is prescribed Lorazepam for this. She reports that she usually has about one panic attack per month that resolves after taking a dose of Lorazepam. She reports that she has had strong anxiety that has been constant and has waxed and waned over the last week and a half. Secondary to this she reports increased heart rate, nausea, pressure in her neck like there \"is a lump in her throat\", and a feeling like her head is \"full\" or \"numb\". She reports that she has had symptoms like these before, but only with stronger panic attacks and only when she is off her medications. She denies hematuria, dysuria, fever, or cough. She reports that she has taken Lorazepam every day which provides temporary relief but hasn't taken it in the last 2 days because she wanted to see if it was \"contributing to her symptoms\". She also reports that she was recently prescribed a new anxiety medication but hasn't started it yet because she is worried that the potential side effects will also \"contribute\" to her anxiety. She reports that her symptoms feel like an uncontrolled anxiety attack. She reports that she had an EKG in the past for a feeling like her heart was \"skipping beats\" but that it was unconcerning.    Allergies:  No known drug allergies     Medications:    Pristiz  Ativan  Citalopram    Past Medical History:    [place]  Vitamin D deficiency  Rosacea  Palpitations  Vaginal mass    Past Surgical History:    The patient does not have any pertinent past surgical history.    Family History:    HTN x3  Diabetes, type 2     Social History:  Smoking status: Never  Alcohol use: No    Marital Status:       Review of Systems   Constitutional: Negative for fever.   Respiratory: Negative for " "cough.    Gastrointestinal: Positive for nausea.   Genitourinary: Negative for dysuria and hematuria.   Psychiatric/Behavioral: The patient is nervous/anxious.    All other systems reviewed and are negative.    Physical Exam     Physical Exam    Patient Vitals for the past 24 hrs:   BP Temp Temp src Pulse Resp SpO2 Height Weight   10/22/19 1826 -- -- -- -- -- 98 % -- --   10/22/19 1825 (!) 132/94 -- -- 78 -- -- -- --   10/22/19 1513 (!) 154/98 98.9  F (37.2  C) Oral 95 16 99 % 1.575 m (5' 2\") 72.1 kg (159 lb)       General: Alert and Interactive.   Head: No signs of trauma.   Mouth/Throat: Oropharynx is clear and moist.   Eyes: Conjunctivae are normal. Pupils are equal, round, and reactive to light.   Neck: Normal range of motion. No nuchal rigidity.   CV: Normal rate and regular rhythm.    Resp: Effort normal and breath sounds normal. No respiratory distress.   GI: Soft. There is no tenderness or guarding.   MSK: Normal range of motion. no edema.   Neuro: The patient is alert and oriented to person, place, and time.  PERRLA, EOMI, strength in upper/lower extremities normal and symmetrical.   Sensation normal. Speech normal.  GCS eye subscore is 4. GCS verbal subscore is 5. GCS motor subscore is 6.   Skin: Skin is warm and dry. No rash noted.   Psych: normal mood and affect. behavior is normal.       Emergency Department Course   ECG:  Indication: Anxiety  Time: 1739  Vent. Rate 85 bpm. HI interval 110. QRS duration 74. QT/QTc 404/480. P-R-T axis 43 47 48. Sinus rhythm with short HI. Nonspecific ST and T wave abnormality. Abnormal ECG Read time: 1740    Imaging:  Radiographic findings were communicated with the patient who voiced understanding of the findings.  XR Chest 2 views:   PA and lateral views of the chest. Lungs are clear. Heart  is normal in size. No effusions are evident. No pneumothorax.    Read as per radiology.    Laboratory:  CBC: WBC: 8.6, HGB: 13.5, PLT: 234    CMP: Glucose 88, anion gap: 2 (L), o/w " WNL (Creatinine: 0.69)    1715 Troponin: <0.015    TSH with free T4 reflex: 1.39    Emergency Department Course:  Nursing notes and vitals reviewed. (1708) I performed an exam of the patient as documented above.     IV inserted. Blood drawn. This was sent to the lab for further testing, results above.    The patient was sent for a chest X-ray while in the emergency department, findings above.     1819 I rechecked the patient and discussed the results of her workup thus far. She was set up with a Holter monitor for discharge.    Findings and plan explained to the Patient. Patient discharged home with instructions regarding supportive care and reasons to return. The importance of close follow-up was reviewed.    I personally reviewed the laboratory results with the Patient and answered all related questions prior to discharge.    Impression & Plan    Medical Decision Making:  Adela Stone is a 49 year old female presented with a sensation of palpitations.  The work up in the Emergency Department is negative, monitoring and EKG have not shown any abnormal heart rhythms or concerning morphologies.  I considered a broad differential diagnosis including acute coronary syndrome, myocardial infarction, pulmonary embolism, electrolyte abnormalities, drug reaction, anxiety, amongst others. Electrolytes are normal and the patient is not anemic. No EKG changes or troponin elevation concerning for acute coronary syndrome.  A Holter monitor was ordered.  The patient has a long standing history of anxiety which may be contributing to and/or exacerbating her symptoms. No serious etiology for the palpitations were detected today during this visit and I feel the patient is safe for discharge.   Close follow up with primary care is indicated should the symptoms continue, as further work up may be performed; this was made clear to the patient, who understands.       Diagnosis:    ICD-10-CM    1. Palpitations R00.2         Disposition:  discharged to home    Chucky Bernard  10/22/2019    EMERGENCY DEPARTMENT  Scribe Disclosure:  I, Chucky Bernard, am serving as a scribe on 10/22/2019 at 5:08 PM to personally document services performed by Ang Valdovinos MD based on my observations and the provider's statements to me.      Ang Valdovinos MD  10/23/19 0055

## 2019-10-22 NOTE — ED TRIAGE NOTES
Pt reports tachycardia and neck pain on/pff for last two weeks. Pt reports she feels she is in panic attack overdrive, taking lorazepam to help. Recently suppose to start new anxiety medication, but has not due to fear of side effects.

## 2019-10-22 NOTE — ED AVS SNAPSHOT
Emergency Department  [address] [place] [address]  Phone:  [phone]  Fax:  [phone]                                    Adela Stone   MRN: [id number]    Department:   Emergency Department   Date of Visit:  10/22/2019           After Visit Summary Signature Page    I have received my discharge instructions, and my questions have been answered. I have discussed any challenges I see with this plan with the nurse or doctor.    ..........................................................................................................................................  Patient/Patient Representative Signature      ..........................................................................................................................................  Patient Representative Print Name and Relationship to Patient    ..................................................               ................................................  Date                                   Time    ..........................................................................................................................................  Reviewed by Signature/Title    ...................................................              ..............................................  Date                                               Time          22EPIC Rev 08/18

## 2019-11-11 ENCOUNTER — COMMUNICATION - HEALTHEAST (OUTPATIENT)
Dept: FAMILY MEDICINE | Facility: CLINIC | Age: 49
End: 2019-11-11

## 2019-11-11 DIAGNOSIS — F41.9 ANXIETY: ICD-10-CM

## 2019-11-20 ENCOUNTER — COMMUNICATION - HEALTHEAST (OUTPATIENT)
Dept: SCHEDULING | Facility: CLINIC | Age: 49
End: 2019-11-20

## 2019-11-20 ENCOUNTER — COMMUNICATION - HEALTHEAST (OUTPATIENT)
Dept: FAMILY MEDICINE | Facility: CLINIC | Age: 49
End: 2019-11-20

## 2019-12-23 ENCOUNTER — RECORDS - HEALTHEAST (OUTPATIENT)
Dept: ADMINISTRATIVE | Facility: OTHER | Age: 49
End: 2019-12-23

## 2019-12-23 ENCOUNTER — COMMUNICATION - HEALTHEAST (OUTPATIENT)
Dept: FAMILY MEDICINE | Facility: CLINIC | Age: 49
End: 2019-12-23

## 2019-12-23 DIAGNOSIS — Z12.11 SPECIAL SCREENING FOR MALIGNANT NEOPLASMS, COLON: ICD-10-CM

## 2020-01-15 ENCOUNTER — HOSPITAL ENCOUNTER (OUTPATIENT)
Dept: MAMMOGRAPHY | Facility: CLINIC | Age: 50
Discharge: HOME | End: 2020-01-15
Attending: FAMILY MEDICINE

## 2020-01-15 DIAGNOSIS — Z12.31 VISIT FOR SCREENING MAMMOGRAM: ICD-10-CM

## 2020-02-14 ENCOUNTER — COMMUNICATION - HEALTHEAST (OUTPATIENT)
Dept: SCHEDULING | Facility: CLINIC | Age: 50
End: 2020-02-14

## 2020-02-14 DIAGNOSIS — F41.9 ANXIETY: ICD-10-CM

## 2020-05-13 ENCOUNTER — COMMUNICATION - HEALTHEAST (OUTPATIENT)
Dept: FAMILY MEDICINE | Facility: CLINIC | Age: 50
End: 2020-05-13

## 2020-05-13 DIAGNOSIS — F41.9 ANXIETY: ICD-10-CM

## 2020-09-21 ENCOUNTER — COMMUNICATION - HEALTHEAST (OUTPATIENT)
Dept: SCHEDULING | Facility: CLINIC | Age: 50
End: 2020-09-21

## 2020-09-21 DIAGNOSIS — F41.9 ANXIETY: ICD-10-CM

## 2020-10-15 ENCOUNTER — COMMUNICATION - HEALTHEAST (OUTPATIENT)
Dept: FAMILY MEDICINE | Facility: CLINIC | Age: 50
End: 2020-10-15

## 2020-10-15 DIAGNOSIS — F41.9 ANXIETY: ICD-10-CM

## 2020-10-26 ENCOUNTER — OFFICE VISIT - HEALTHEAST (OUTPATIENT)
Dept: FAMILY MEDICINE | Facility: CLINIC | Age: 50
End: 2020-10-26

## 2020-10-26 ENCOUNTER — COMMUNICATION - HEALTHEAST (OUTPATIENT)
Dept: FAMILY MEDICINE | Facility: CLINIC | Age: 50
End: 2020-10-26

## 2020-10-26 DIAGNOSIS — E55.9 VITAMIN D DEFICIENCY: ICD-10-CM

## 2020-10-26 DIAGNOSIS — F41.1 GENERALIZED ANXIETY DISORDER: ICD-10-CM

## 2021-01-26 ENCOUNTER — COMMUNICATION - HEALTHEAST (OUTPATIENT)
Dept: FAMILY MEDICINE | Facility: CLINIC | Age: 51
End: 2021-01-26

## 2021-01-28 ENCOUNTER — RECORDS - HEALTHEAST (OUTPATIENT)
Dept: GENERAL RADIOLOGY | Facility: CLINIC | Age: 51
End: 2021-01-28

## 2021-01-28 ENCOUNTER — OFFICE VISIT - HEALTHEAST (OUTPATIENT)
Dept: FAMILY MEDICINE | Facility: CLINIC | Age: 51
End: 2021-01-28

## 2021-01-28 DIAGNOSIS — M40.00 KYPHOSIS (ACQUIRED) (POSTURAL): ICD-10-CM

## 2021-01-28 DIAGNOSIS — Z13.228 ENCOUNTER FOR SCREENING FOR OTHER METABOLIC DISORDERS: ICD-10-CM

## 2021-01-28 DIAGNOSIS — Z11.3 ROUTINE SCREENING FOR STI (SEXUALLY TRANSMITTED INFECTION): ICD-10-CM

## 2021-01-28 DIAGNOSIS — M40.00 POSTURAL KYPHOSIS, SITE UNSPECIFIED: ICD-10-CM

## 2021-01-28 DIAGNOSIS — Z12.4 SCREENING FOR CERVICAL CANCER: ICD-10-CM

## 2021-01-28 DIAGNOSIS — F41.1 GENERALIZED ANXIETY DISORDER: ICD-10-CM

## 2021-01-28 DIAGNOSIS — Z12.31 VISIT FOR SCREENING MAMMOGRAM: ICD-10-CM

## 2021-01-28 DIAGNOSIS — E55.9 VITAMIN D DEFICIENCY: ICD-10-CM

## 2021-01-28 DIAGNOSIS — Z00.00 ROUTINE GENERAL MEDICAL EXAMINATION AT A HEALTH CARE FACILITY: ICD-10-CM

## 2021-01-28 LAB
ALBUMIN SERPL-MCNC: 4.1 G/DL (ref 3.5–5)
ALP SERPL-CCNC: 76 U/L (ref 45–120)
ALT SERPL W P-5'-P-CCNC: 28 U/L (ref 0–45)
ANION GAP SERPL CALCULATED.3IONS-SCNC: 9 MMOL/L (ref 5–18)
AST SERPL W P-5'-P-CCNC: 23 U/L (ref 0–40)
BILIRUB SERPL-MCNC: 0.9 MG/DL (ref 0–1)
BUN SERPL-MCNC: 11 MG/DL (ref 8–22)
CALCIUM SERPL-MCNC: 9.2 MG/DL (ref 8.5–10.5)
CHLORIDE BLD-SCNC: 107 MMOL/L (ref 98–107)
CHOLEST SERPL-MCNC: 195 MG/DL
CO2 SERPL-SCNC: 26 MMOL/L (ref 22–31)
CREAT SERPL-MCNC: 0.8 MG/DL (ref 0.6–1.1)
FASTING STATUS PATIENT QL REPORTED: YES
GFR SERPL CREATININE-BSD FRML MDRD: >60 ML/MIN/1.73M2
GLUCOSE BLD-MCNC: 98 MG/DL (ref 70–125)
HDLC SERPL-MCNC: 56 MG/DL
LDLC SERPL CALC-MCNC: 121 MG/DL
POTASSIUM BLD-SCNC: 4.8 MMOL/L (ref 3.5–5)
PROT SERPL-MCNC: 7.2 G/DL (ref 6–8)
SODIUM SERPL-SCNC: 142 MMOL/L (ref 136–145)
TRIGL SERPL-MCNC: 92 MG/DL

## 2021-01-28 ASSESSMENT — PATIENT HEALTH QUESTIONNAIRE - PHQ9: SUM OF ALL RESPONSES TO PHQ QUESTIONS 1-9: 5

## 2021-01-28 ASSESSMENT — MIFFLIN-ST. JEOR: SCORE: 1307.15

## 2021-01-29 ENCOUNTER — COMMUNICATION - HEALTHEAST (OUTPATIENT)
Dept: FAMILY MEDICINE | Facility: CLINIC | Age: 51
End: 2021-01-29

## 2021-01-29 DIAGNOSIS — M40.00 KYPHOSIS (ACQUIRED) (POSTURAL): ICD-10-CM

## 2021-01-29 DIAGNOSIS — E55.9 VITAMIN D DEFICIENCY: ICD-10-CM

## 2021-01-29 LAB
25(OH)D3 SERPL-MCNC: 16.3 NG/ML (ref 30–80)
HCV AB SERPL QL IA: NEGATIVE
HIV 1+2 AB+HIV1 P24 AG SERPL QL IA: NEGATIVE
HPV SOURCE: NORMAL
HUMAN PAPILLOMA VIRUS 16 DNA: NEGATIVE
HUMAN PAPILLOMA VIRUS 18 DNA: NEGATIVE
HUMAN PAPILLOMA VIRUS FINAL DIAGNOSIS: NORMAL
HUMAN PAPILLOMA VIRUS OTHER HR: NEGATIVE
SPECIMEN DESCRIPTION: NORMAL

## 2021-02-08 ENCOUNTER — COMMUNICATION - HEALTHEAST (OUTPATIENT)
Dept: FAMILY MEDICINE | Facility: CLINIC | Age: 51
End: 2021-02-08

## 2021-03-06 ENCOUNTER — HOSPITAL ENCOUNTER (OUTPATIENT)
Dept: MAMMOGRAPHY | Facility: CLINIC | Age: 51
Discharge: HOME | End: 2021-03-06
Attending: FAMILY MEDICINE

## 2021-03-06 DIAGNOSIS — Z12.31 VISIT FOR SCREENING MAMMOGRAM: ICD-10-CM

## 2021-04-07 ENCOUNTER — AMBULATORY - HEALTHEAST (OUTPATIENT)
Dept: NURSING | Facility: CLINIC | Age: 51
End: 2021-04-07

## 2021-04-28 ENCOUNTER — AMBULATORY - HEALTHEAST (OUTPATIENT)
Dept: NURSING | Facility: CLINIC | Age: 51
End: 2021-04-28

## 2021-05-03 ENCOUNTER — RECORDS - HEALTHEAST (OUTPATIENT)
Dept: SCHEDULING | Facility: CLINIC | Age: 51
End: 2021-05-03

## 2021-05-27 ASSESSMENT — PATIENT HEALTH QUESTIONNAIRE - PHQ9: SUM OF ALL RESPONSES TO PHQ QUESTIONS 1-9: 5

## 2021-05-28 NOTE — TELEPHONE ENCOUNTER
Weaning off of the celexa.    Patient is having nausea, feeling jittery, and trembling with this decrease    No chest pain.    No shortness of breath    Side effects of the medications of the medication.    Advised that she make sure that she hydrate well, avoid caffeine, and be seen if symptoms worsen.    Swetha Armas, RN  [facility]ication Refill and Triage Nurse  4/25/2019  12:12 PM         Reason for Disposition    Caller has medication question only, adult not sick, and triager answers question    Protocols used: MEDICATION QUESTION CALL-A-

## 2021-05-29 NOTE — PROGRESS NOTES
"SUBJECTIVE: Adela Stone is a 49 y.o. female with:  Chief Complaint   Patient presents with     Shoulder Pain     4-7 months, chronic, left arm and shoulder , recently worse, hurts when at rest vs when used     She presents with several month history of nerve like pain in left arm/ shoulder.  No injury or trauma to neck or shoulder.  Has pain if dangling left arm down or arm draped across lap.  Pain is in shoulder and upper back.  Going on for 4-6 months.  Pain was worse over weekend and she felt tingling in her fingertips.  Feels better in am and worse as day goes on.  Not aggravated by moving the arm or exercise.  Arm feels weak - heavy at times.  Difficult to play volleyball.  She is able to sleep.  Had nerve block in left arm for finger surgery in the past.    She continues to feel anxious but does not want to take medicine. Has weaned off citalopram and her rash has resolved.  Took CereVive but only for 1 month and not sure if helped.  She has been laid off work and is still unemployed which is adding to her anxiety.    OBJECTIVE: /74   Pulse 77   Ht 5' 2.25\" (1.581 m)   Wt 158 lb (71.7 kg)   SpO2 98%   BMI 28.67 kg/m   no distress  Neck: No tenderness. Good range of motion.  Shoulders: Good range of motion.  Left shoulder - no tenderness.   upper extremities: Muscle strength 5/5 equal bilaterally.  DTRs symmetric bilaterally     Adela was seen today for shoulder pain.    Diagnoses and all orders for this visit:    Pain of left upper arm  -     XR Cervical Spine 2 - 3 VWS; Future     Etiology unclear.  Her shoulder exam is normal so does not appear to be a rotator cuff issue.  I wonder about nerve compression. Check c-spine film then will start with some PT.  If not improving, consider MRI of the c-spine.    I did recommend she restart Cerevive for her anxiety.    Luz Elena Alonzo   "

## 2021-05-29 NOTE — PROGRESS NOTES
[facility]   Initial Evaluation    Patient Name: Adela Stone  Date of evaluation: 6/21/2019  PRECAUTIONS: none  Referral Diagnosis: Pain of left upper arm  Referring provider: Luz Elena Alonzo*  Visit Diagnosis:     ICD-10-CM    1. Left cervical radiculopathy M54.12    2. Decreased ROM of neck R29.898    3. Abnormal posture R29.3        Assessment:      Pt. is appropriate for skilled PT intervention as outlined in the Plan of Care (POC).  Pt. is a good candidate for skilled PT services to improve pain levels and function.  Goals and POC established in collaboration with the patient.  Signs/sx consistent with acute left cervical radiculopathy within the C6 distribution.    Goals:  Pt. will be independent with home exercise program in : 6 weeks;Comment  Comment:: for resolution of pain  Pt. will be able to walk : Comment  Comment:: for exercise without L UE pain in 6 weeks.  Patient will sit: Comment  Comment: for working on the computer without pain in 6 weeks.  Patient will look up / down: with full ROM;with no pain;in 6 weeks      Patient's expectations/goals are realistic.    Barriers to Learning or Achieving Goals:  No Barriers.       Plan / Patient Instructions:        Plan of Care:   Communication with: Referral Source  Patient Related Instruction: Nature of Condition;Treatment plan and rationale;Self Care instruction;Basis of treatment;Body mechanics;Posture;Precautions;Next steps;Expected outcome  Times per Week: 1-2  Number of Weeks: 6  Number of Visits: 12  Therapeutic Exercise: Stretching;Strengthening  Neuromuscular Reeducation: posture (nerve glides)  Manual Therapy: joint mobilization;soft tissue mobilization  Modalities: traction (PRN)      Plan for next visit: Review HEP, adding TB rows and pull downs. Continue cervical distraction, adding manual L UE median nerve glides.     Subjective:       History of Present Illness:    Adela Sofia) is a 49 y.o. female who presents to therapy  "today with complaints of left-sided neck and upper extremity (lateral shoulder/upper arm) pain.   Reports tingling into the first two fingers (intermittent).  Reports L UE feels heavier, like she has less strength.  PER EMR: \"She presents with several month history of nerve like pain in left arm/ shoulder.  No injury or trauma to neck or shoulder.  Has pain if dangling left arm down or arm draped across lap.  Pain is in shoulder and upper back.  Going on for 4-6 months.  Pain was worse over weekend and she felt tingling in her fingertips.  Feels better in am and worse as day goes on.  Not aggravated by moving the arm or exercise.  Arm feels weak - heavy at times.  Difficult to play volleyball.  She is able to sleep.  Had nerve block in left arm for finger surgery in the past.\"  Onset: About 6 months ago. Denies specific injury or incident  Duration: Intermittent  Worse with looking up, having arm hanging down by the side (walking or sitting)  Better with changing arm position and sitting straighter  Pain Medication: Did take ibuprofen when severe a few weeks ago, but has not needed to take anything recently.  Sleep: Denies waking due to pain.    Pt seeks PT to \"relieve arm and neck pain.\"    Pain Ratin  Pain rating at best: 1  Pain rating at worst: 7  Pain description: aching, burning, dull, pain, soreness, tingling and weakness     Objective:      Patient Outcome Measures :    Shoulder Pain and Disability Index (SPADI) in %: 10     Scores range from 0-100%, where a score of 0% represents minimal pain and maximal function. The minimal clincically important difference is a score reduction of 10%.  Neck Disability Score in %: 12     Scores range from 0-100%, where a score of 0% represents minimal pain and maximal function. The minmal clinically important difference is a score reduction of 10%.     PER EMR:  EXAM: XR CERVICAL SPINE 2 - 3 VWS  LOCATION: [facility]  DATE/TIME: 2019 1:39 " PM     INDICATION: Left arm pain  COMPARISON: None.     FINDINGS: Normal craniocervical and atlantoaxial alignment. Straightening of cervical lordosis without listhesis.     No displaced fracture. No aggressive osseous abnormality. Vertebral body heights are maintained. Mild disc height loss and degenerative endplate changes at C6-C7. Minimal degenerative endplate changes at C5-C6. Remaining intervertebral disc spaces are   maintained. No significant facet arthropathy. Soft tissues and included lungs are normal..    Examination  1. Left cervical radiculopathy     2. Decreased ROM of neck     3. Abnormal posture       Involved side: Left  Posture Observation:      General sitting posture is  fair.  General standing posture is fair.  Mildly forward head/increased thoracic kyphosis    Cervical ROM  Date:      *Indicate scale AROM AROM AROM   Cervical Flexion 45     Cervical Extension 35 with pain into L UE      Right Left Right Left Right Left   Cervical Sidebending 25 20       Cervical Rotation 65 65       Cervical Protraction      Cervical Retraction      Thoracic Flexion      Thoracic Extension      Thoracic Sidebending         Thoracic Rotation           Strength  Date:      Cervical Myotomes/5 Right Left Right Left Right Left   Cervical Flexion (C1-2)         Cervical Sidebending (C3)         Shoulder Elevation (C4) 5 5       Shoulder Abduction (C5) 5 5       Elbow Flexion (C6) 5 5       Elbow Extension (C7) 5 5       Wrist Flexion (C7)         Wrist Extension (C6)         Thumb abduction (C8) 5 5       Finger Abduction (T1) 5 5         Cervical Special Tests  Cervical Special Tests Right Left UE Nerve Mobility Right Left   Cervical compression   Median nerve  +   Cervical distraction  + Ulnar nerve     Spurling s test  + Radial nerve     Shoulder abduction sign  + Thoracic outlet     Deep neck flexor endurance test   Donte     Upper cervical rotation   Adson s     Sharper-Jenniffer   Cervical rotation lateral flexion  "    Alar ligament test   Other:     Other:   Other:         Treatment Today     TREATMENT MINUTES COMMENTS   Evaluation 12 Cervical   Self-care/ Home management     Manual therapy 9 Supine cervical distraction 60\" holds, 10\" rest.  Educated patient's daughter on proper performance to assist if able, 60\" holds, x5-10 reps, 1x/day.   Neuromuscular Re-education     Therapeutic Activity     Therapeutic Exercises 24 -Discussed therapy diagnosis, prognosis, POC, relevant neuroanatomy and basis for tx.  -Reviewed and performed HEP with handouts provided.   Gait training     Modality__________________                Total 45    Blank areas are intentional and mean the treatment did not include these items.            PT Evaluation Code: (Please list factors)  Patient History/Comorbidities: none  Examination: L cervical radiculopathy  Clinical Presentation: Stable  Clinical Decision Making: Low    Patient History/  Comorbidities Examination  (body structures and functions, activity limitations, and/or participation restrictions) Clinical Presentation Clinical Decision Making (Complexity)   No documented Comorbidities or personal factors 1-2 Elements Stable and/or uncomplicated Low   1-2 documented comorbidities or personal factor 3 Elements Evolving clinical presentation with changing characteristics Moderate   3-4 documented comorbidities or personal factors 4 or more Unstable and unpredictable High           Erwin Carl, PT, DPT  6/21/2019  9:02 AM                 "

## 2021-05-30 VITALS — BODY MASS INDEX: 27.97 KG/M2 | HEIGHT: 62 IN | WEIGHT: 152 LBS

## 2021-05-30 NOTE — TELEPHONE ENCOUNTER
Controlled Substance Refill Request  Medication:   Requested Prescriptions     Pending Prescriptions Disp Refills     LORazepam (ATIVAN) 0.5 MG tablet 30 tablet 0     Sig: Take 1 tablet (0.5 mg total) by mouth daily as needed for anxiety.     Date Last Fill: 10/12/18  Ph[id number]   Submit electronically to pharmacy  Controlled Substance Agreement on File:   Encounter-Level CSA Scan Date:    There are no encounter-level csa scan date.       Last office visit: Last office visit pertaining to requested medication was 6/12/19.

## 2021-05-30 NOTE — PROGRESS NOTES
[facility] Daily Progress     Patient Name: Adela Stone  Date: 2019  Visit #: 2  PTA visit #:    PRECAUTIONS: none  Referral Diagnosis: Pain of left upper arm  Referring provider: Luz Elena Alonzo  Visit Diagnosis:     ICD-10-CM    1. Left cervical radiculopathy M54.12    2. Decreased ROM of neck R29.898    3. Abnormal posture R29.3          Assessment:     Signs/sx consistent with acute left cervical radiculopathy within the C6 distribution.  HEP/POC compliance is  good .  Patient is benefitting from skilled physical therapy and is making steady progress toward functional goals.  Patient is appropriate to continue with skilled physical therapy intervention, as indicated by initial plan of care.    Goal Status:  Pt. will be independent with home exercise program in : 6 weeks;Comment  Comment:: for resolution of pain  Pt. will be able to walk : Comment  Comment:: for exercise without L UE pain in 6 weeks.  Patient will sit: Comment  Comment: for working on the computer without pain in 6 weeks.  Patient will look up / down: with full ROM;with no pain;in 6 weeks      Plan / Patient Education:     Next visit: Add TB rows and pull downs to HEP.  Continue cervical distraction.    Subjective:     Pain Ratin/10 L lateral shoulder  Reports to be feeling much better already. Having a lot less of the numbness and tingling into the hand. Amazed how much better she felt even after leaving last PT session.  Has not been doing any cervical distraction with help from daughter.    Objective:     Reviewed HEP to ensure proper performance.  Able to progress median nerve glides to tensioners.    Treatment Today     TREATMENT MINUTES COMMENTS   Evaluation     Self-care/ Home management     Manual therapy 13 Supine cervical distraction   Neuromuscular Re-education     Therapeutic Activity     Therapeutic Exercises 12 Ex per flow sheet   Gait training     Modality__________________                Total 25     Blank areas are intentional and mean the treatment did not include these items.       Erwin Carl, PT, DPT  6/26/2019

## 2021-05-30 NOTE — TELEPHONE ENCOUNTER
Call pt - she needs to come in for a controlled substance contract as medication needs to be monitored on a regular basis.

## 2021-05-30 NOTE — PROGRESS NOTES
[facility] Daily Progress     Patient Name: Adela Stone  Date: 7/3/2019  Visit #: 3  PTA visit #:    PRECAUTIONS: none  Referral Diagnosis: Pain of left upper arm  Referring provider: Luz Elena Alonzo  Visit Diagnosis:     ICD-10-CM    1. Left cervical radiculopathy M54.12    2. Decreased ROM of neck R29.898    3. Abnormal posture R29.3          Assessment:     Signs/sx consistent with acute left cervical radiculopathy within the C6 distribution.  HEP/POC compliance is  good .  Patient is benefitting from skilled physical therapy and is making steady progress toward functional goals.  Patient is appropriate to continue with skilled physical therapy intervention, as indicated by initial plan of care.    Goal Status:  Pt. will be independent with home exercise program in : 6 weeks;Comment  Comment:: for resolution of pain  Pt. will be able to walk : Comment  Comment:: for exercise without L UE pain in 6 weeks.  Patient will sit: Comment  Comment: for working on the computer without pain in 6 weeks.  Patient will look up / down: with full ROM;with no pain;in 6 weeks      Plan / Patient Education:     Next visit: Review TB rows and pull downs, progressing to prone Is, Ws, as tolerated.  Continue cervical distraction.    Subjective:     Pain Ratin/10 L lateral shoulder  Reports to be having a bit more pain this week. Recalls one day where it was especially bothersome, although she is unsure why. Feeling good today however.    Objective:     Issued OTB to progress scap retractor strength with rows, pull downs.    Treatment Today     TREATMENT MINUTES COMMENTS   Evaluation     Self-care/ Home management     Manual therapy 14 Supine cervical distraction   Neuromuscular Re-education     Therapeutic Activity     Therapeutic Exercises 12 Ex per flow sheet   Gait training     Modality__________________                Total 26    Blank areas are intentional and mean the treatment did not include these  items.       Erwin Carl, PT, DPT  7/3/2019     [facility] Discharge Summary  Patient Name: Adela Stone  Date: 8/19/2019  Referral Diagnosis: Pain of left upper arm  Referring provider: Luz Elena Alonzo  Visit Diagnosis:   1. Left cervical radiculopathy     2. Decreased ROM of neck     3. Abnormal posture         Goals: PROGRESSING  Pt. will be independent with home exercise program in : 6 weeks;Comment  Comment:: for resolution of pain  Pt. will be able to walk : Comment  Comment:: for exercise without L UE pain in 6 weeks.  Patient will sit: Comment  Comment: for working on the computer without pain in 6 weeks.  Patient will look up / down: with full ROM;with no pain;in 6 weeks      Patient was seen for 3 visits from 6/21/19 to 7/3/19, having to cancel the remainder of her appts due to her new employment.  Overall, patient with good progression towards goals, noting good relief of pain with regular HEP performance.  Pt has not called to schedule any further follow-up since last visit.    Therapy will be discontinued at this time.  The patient will need a new referral to resume.    Thank you for your referral.  Erwin Carl  8/19/2019  1:46 PM

## 2021-06-01 VITALS — HEIGHT: 62 IN | BODY MASS INDEX: 27.97 KG/M2 | WEIGHT: 152 LBS

## 2021-06-01 VITALS — BODY MASS INDEX: 28.16 KG/M2 | HEIGHT: 62 IN | WEIGHT: 153 LBS

## 2021-06-01 NOTE — TELEPHONE ENCOUNTER
Central PA team  [phone]  Pool: HE PA MED ([id number])          PA has been initiated.       PA form completed and faxed insurance via Cover My Meds     Key:  : [id number] - PA Case ID: [id number] - Rx #: [id number]     Medication:  Desvenlafaxine Succinate ER 50MG er tablets    Insurance:  [employer]        Response will be received via fax and may take up to 5-10 business days depending on plan

## 2021-06-01 NOTE — TELEPHONE ENCOUNTER
Called [facility] Mammography scheduling department and patient is scheduled for a 3D mammogram. No additional orders are needed.     OCTAVIO/ZELDAA

## 2021-06-01 NOTE — PROGRESS NOTES
FEMALE ADULT PREVENTIVE EXAM    CHIEF COMPLAINT:  Female preventive exam.    SUBJECTIVE:  Adela tSone is a 49 y.o. female who presents for her routine physical exam.    Patient would like to address the following concerns today:   1) Anxiety- Worse now.  Went off citalopram about 1 year ago and anxiety slowly getting worse.  Started new job.  Will wake up on weekends and feel very anxious.  Also getting panic attacks.  Has taken lorazepam.  Has been using about 2 tabs/ week.  No side effects from medicine.  She will feel fatigued/ heart pounding/ pressure in her face with numbness/ racing thoughts/ hard to speak.  Started omega-3s.  Felt citalopram was not helping but could not go up on dose due to gene/ drug interactions.    2) Would like to have 3D mammogram.  Has history of dense breasts.      GYNE HISTORY  Menses: monthly  Sexually Active: no  Last Pap: 2018 normal  Abnormal Pap: no      She  has a past medical history of Anxiety.    Lab Results   Component Value Date    WBC 6.1 01/08/2014    HGB 13.6 03/17/2017    HCT 41.2 01/08/2014    MCV 94 01/08/2014     01/08/2014     07/30/2018    K 4.5 07/30/2018    BUN 13 07/30/2018     Lab Results   Component Value Date    CHOL 199 07/30/2018    HDL 60 07/30/2018    LDLCALC 127 07/30/2018    TRIG 58 07/30/2018     Lab Results   Component Value Date    TSH 1.32 07/30/2018     BP Readings from Last 3 Encounters:   09/09/19 120/90   06/12/19 124/74   03/18/19 122/80       Surgeries:    Past Surgical History:   Procedure Laterality Date     ID DENTAL SURGERY PROCEDURE      Description: Oral Surgery Tooth Extraction;  Recorded: 08/07/2013;       Family History:  Her family history includes Cancer in her paternal grandfather and paternal grandmother; Diabetes in her father; Diabetes type II in her father; Hypertension in her brother, brother, father, maternal grandfather, maternal grandmother, and mother; Lung cancer in her maternal grandmother.    Social  History:  She  reports that she has never smoked. She has never used smokeless tobacco. She reports that she drinks about 1.5 oz of alcohol per week. She reports that she does not use drugs. Single mom.  Lives with 3 teenage children.  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    Medications:    Current Outpatient Medications:      LORazepam (ATIVAN) 0.5 MG tablet, Take 1 tablet (0.5 mg total) by mouth daily as needed for anxiety., Disp: 30 tablet, Rfl: 0  HELD MEDICATIONS: None.      Allergies:  No latex allergies.  No Known Allergies         RISK BEHAVIOR & HEALTH HABITS  Regular Exercise: biking/ walks.  Balanced diet: yes.  Calcium/vitamin D: no  Seat Belt Use: yes    Mammogram: 2018 normal    Dental Care: YES    REVIEW OF SYSTEMS:  Complete head to toe review of systems is otherwise negative except as above.    OBJECTIVE:  VITAL SIGNS:    Vitals:    09/09/19 0802   BP: 120/90   Pulse: 89   SpO2: 100%     GENERAL:  Patient alert, in no acute distress.  EYES: PERRLA. Extraoccular movements intact, pupils equal, reactive to light and accommodation.  Normal conjunctiva and lids.   ENT:  Hearing grossly normal.  Normal appearance to ears and nose.  Bilateral TM s, external canals, oropharynx normal. Normal lips, gums and teeth.   NECK:  Supple, without thyromegaly or mass.  RESP:  Clear to auscultation without crackles, wheezes or distress.  Normal respiratory effort.   CV:  Regular rate and rhythm without murmurs, rubs or gallops.  Normal pedal pulses.  No varicosities or edema.  ABDOMEN:  Soft, non-tender, without hepatosplenomegaly, masses, or hernias.   BREASTS:  Nontender, without masses, nipple discharge, erythema, or axillary adenopathy.    LYMPHATIC: No cervical or axillary lymphadenopathy.  No bruising.  NEURO:  CN II-XII intact, motor & sensory function all intact.  DTR and reflexes normal.  PSYCHIATRIC:  Alert & oriented.  Anxious mood and affect.  Good judgment and insight.  SKIN:  Normal  inspection and palpation.  MUSCULOSKELETAL: Normal gait and station.  - Spine / Ribs / Pelvis: Normal inspection, ROM, stability and strength: Spine, Head, Neck, Upper and Lower Extremities.    Adela was seen today for annual exam.    Diagnoses and all orders for this visit:    Routine general medical examination at a health care facility  Up to date with pap / tetanus.    Screen for colon cancer  -     Ambulatory referral for Colonoscopy    Anxiety- Discussed options for treatment using [employer] recommendations.  Will start on Pristiq.  Add L-theanine two times a day.  Controlled substance contract for lorazepam.  -     LORazepam (ATIVAN) 0.5 MG tablet; Take 1 tablet (0.5 mg total) by mouth daily as needed for anxiety.  -     desvenlafaxine succinate (PRISTIQ) 50 MG 24 hr tablet; Take 1 tablet (50 mg total) by mouth daily.    Encounter for screening for other metabolic disorders  -     Lipid Cascade  -     Basic Metabolic Panel    Encounter for screening mammogram for breast cancer       Patient Instructions   To order supplements go to the web site: [employer]  Use my authorization number to order the recommended supplements: S99    L-theanine 100 mg two times a day     Start on vitamin D3 2000 IU daily    Look into mindfulness/ yoga options at work    Start on Pristiq ( desvenlafaxine ) 50 mg in am.    Follow-up with me in 1 month.       Luz Elena Alonzo

## 2021-06-01 NOTE — TELEPHONE ENCOUNTER
Prior Authorization Request  Who s requesting:  Pharmacy  Pharmacy Name and Location: [facility]  Medication Name:   Disp Refills Start End     desvenlafaxine succinate (PRISTIQ) 50 MG 24 hr tablet 30 tablet 2 9/9/2019     Sig - Route: Take 1 tablet (50 mg total) by mouth daily. - Oral    Sent to pharmacy as: desvenlafaxine succinate (PRISTIQ) 50 MG 24 hr tablet    E-Prescribing Status: Receipt confirmed by pharmacy (9/9/2019  8:26 AM CDT)      Insurance Plan: unknown on the fax  Insurance Member ID Number:  Unknown on the fax  Informed patient that prior authorizations can take up to 10 business days for response:   No  Okay to leave a detailed message: No      CoverMeds Key:  [id number]

## 2021-06-01 NOTE — PATIENT INSTRUCTIONS - HE
To order supplements go to the web site: [employer]  Use my authorization number to order the recommended supplements: S99    L-theanine 100 mg two times a day     Start on vitamin D3 2000 IU daily    Look into mindfulness/ yoga options at work    Start on Pristiq ( desvenlafaxine ) 50 mg in am.    Follow-up with me in 1 month.

## 2021-06-02 ENCOUNTER — RECORDS - HEALTHEAST (OUTPATIENT)
Dept: ADMINISTRATIVE | Facility: CLINIC | Age: 51
End: 2021-06-02

## 2021-06-02 VITALS — HEIGHT: 62 IN | BODY MASS INDEX: 28.89 KG/M2 | WEIGHT: 157 LBS

## 2021-06-02 NOTE — TELEPHONE ENCOUNTER
RN triage  Patient is reporting for 2 weeks with symptoms of heart pounding, tight sensation in her neck that goes up to her jaw and up to her head, Currently pulse is 104   No chest pain, no arm pain  Has felt these symptoms today all morning  Does feel like she has to take a deep breath at times.  Patient is unsure if this is related to her anxiety. She does note that she uses lorazepam 0.5 mg 1 per day and notes symptoms are improved but come back.  She states that she was supposed to start a new medication for anxiety but has not started it.  Gave disposition to be seen in ED now. Patient states she will arrange care for her children then go to nearest ED.  Elaine Joseph, RN  [facility] Triage Nurse  2:01 PM  10/22/2019        Reason for Disposition    Patient sounds very sick or weak to the triager     Patient reporting heart pounding with tight sensation in neck and jaw all day today. Denies chest pain, patient relating feelings to anxiety.  Gave disposition to be seen in ED now for evaluation.    Protocols used: HEART RATE AND HEARTBEAT [id number]

## 2021-06-03 VITALS
BODY MASS INDEX: 28.49 KG/M2 | HEIGHT: 62 IN | WEIGHT: 154.8 LBS | OXYGEN SATURATION: 100 % | DIASTOLIC BLOOD PRESSURE: 90 MMHG | SYSTOLIC BLOOD PRESSURE: 120 MMHG | HEART RATE: 89 BPM

## 2021-06-03 VITALS — BODY MASS INDEX: 29.08 KG/M2 | WEIGHT: 158 LBS | HEIGHT: 62 IN

## 2021-06-03 NOTE — TELEPHONE ENCOUNTER
FYI: Pt's notified and will come  her rx here at [facility]. She will call back for appointment after look at her calendar.

## 2021-06-03 NOTE — TELEPHONE ENCOUNTER
Prior Authorization Request  Who s requesting:  Patient  Pharmacy Name and Location: [facility] #[id number]  Medication Name:   desvenlafaxine succinate (PRISTIQ) 50 MG 24 hr tablet 30 tablet 2 9/9/2019     Sig - Route: Take 1 tablet (50 mg total) by mouth daily. - Oral    Sent to pharmacy as: desvenlafaxine succinate (PRISTIQ) 50 MG 24 hr tablet    E-Prescribing Status: Receipt confirmed by pharmacy (9/9/2019  8:26 AM CDT        Insurance Plan: [employer]  Insurance Member ID Number:  [id number]  Informed patient that prior authorizations can take up to 10 business days for response:   Yes  Okay to leave a detailed message: Yes

## 2021-06-03 NOTE — TELEPHONE ENCOUNTER
Central PA team  [phone]  Pool: HE PA MED ([id number])          PA has been initiated.       PA form completed and faxed insurance via Cover My Meds     Key:  [id number]     Medication:  DESVENLAFAXINE SUCCINATE ER 50MG     Insurance:  [employer]         Response will be received via fax and may take up to 5-10 business days depending on plan

## 2021-06-03 NOTE — TELEPHONE ENCOUNTER
Prior Authorization Request  Who s requesting:  Pharmacy  Pharmacy Name and Location:   [facility]  Medication Name:   desvenlafaxine succinate (PRISTIQ) 50 MG 24 hr tablet 30 tablet 2 9/9/2019     Sig - Route: Take 1 tablet (50 mg total) by mouth daily.      [facility]  Insurance Member ID Number:  [id number]  Informed patient that prior authorizations can take up to 10 business days for response:   No  Okay to leave a detailed message: Yes

## 2021-06-03 NOTE — TELEPHONE ENCOUNTER
Controlled Substance Refill Request  Medication:   Requested Prescriptions     Pending Prescriptions Disp Refills     LORazepam (ATIVAN) 0.5 MG tablet 30 tablet 0     Sig: Take 1 tablet (0.5 mg total) by mouth daily as needed for anxiety.     Date Last Fill: 9/9/19  [facility]   Submit electronically to pharmacy  Controlled Substance Agreement on File:   Encounter-Level CSA Scan Date:    There are no encounter-level csa scan date.       Last office visit: Last office visit pertaining to requested medication was 9/9/19.

## 2021-06-05 VITALS
HEART RATE: 80 BPM | DIASTOLIC BLOOD PRESSURE: 84 MMHG | BODY MASS INDEX: 30.02 KG/M2 | RESPIRATION RATE: 16 BRPM | SYSTOLIC BLOOD PRESSURE: 130 MMHG | HEIGHT: 62 IN | OXYGEN SATURATION: 99 % | WEIGHT: 163.13 LBS | TEMPERATURE: 97.8 F

## 2021-06-06 NOTE — TELEPHONE ENCOUNTER
RN cannot approve Refill Request    RN can NOT refill this medication Protocol failed and NO refill given.      Adela Ibanez, [facility] Triage/Med Refill 2/19/2020    Requested Prescriptions   Pending Prescriptions Disp Refills     desvenlafaxine succinate (PRISTIQ) 50 MG 24 hr tablet [Pharmacy Med Name: DESVENLAFAXINE SUC ER 50 MG TB] 30 tablet 1     Sig: TAKE 1 TABLET BY MOUTH EVERY DAY       Venlafaxine/Desvenlafaxine Refill Protocol Failed - 2/14/2020  9:33 AM        Failed - LFT or AST or ALT in last year     Albumin   Date Value Ref Range Status   12/24/2014 3.9 3.5 - 5.0 g/dL Final     Bilirubin, Total   Date Value Ref Range Status   12/24/2014 0.5 0.0 - 1.0 mg/dL Final     Alkaline Phosphatase   Date Value Ref Range Status   12/24/2014 61 45 - 120 U/L Final     AST   Date Value Ref Range Status   12/24/2014 19 0 - 40 U/L Final     ALT   Date Value Ref Range Status   12/24/2014 23 0 - 45 U/L Final     Protein, Total   Date Value Ref Range Status   12/24/2014 7.3 6.0 - 8.0 g/dL Final                Passed - Fasting lipid cascade in last year     Cholesterol   Date Value Ref Range Status   09/09/2019 181 <=199 mg/dL Final     Triglycerides   Date Value Ref Range Status   09/09/2019 66 <=149 mg/dL Final     HDL Cholesterol   Date Value Ref Range Status   09/09/2019 58 >=50 mg/dL Final     LDL Calculated   Date Value Ref Range Status   09/09/2019 110 <=129 mg/dL Final     Patient Fasting > 8hrs?   Date Value Ref Range Status   09/09/2019 Yes  Final             Passed - PCP or prescribing provider visit in last year     Last office visit with prescriber/PCP: 6/12/2019 Luz Elena Alonzo MD OR same dept: Visit date not found OR same specialty: Visit date not found  Last physical: 9/9/2019 Last MTM visit: Visit date not found   Next visit within 3 mo: Visit date not found  Next physical within 3 mo: Visit date not found  Prescriber OR PCP: Luz Elena Alonzo MD  Last diagnosis associated with  med order: 1. Anxiety  - desvenlafaxine succinate (PRISTIQ) 50 MG 24 hr tablet [Pharmacy Med Name: DESVENLAFAXINE SUC ER 50 MG TB]; TAKE 1 TABLET BY MOUTH EVERY DAY  Dispense: 30 tablet; Refill: 1    If protocol passes may refill for 12 months if within 3 months of last provider visit (or a total of 15 months).             Passed - Blood Pressure in last year     BP Readings from Last 1 Encounters:   09/09/19 120/90

## 2021-06-08 NOTE — TELEPHONE ENCOUNTER
Controlled Substance Refill Request  Medication Name:   Requested Prescriptions     Pending Prescriptions Disp Refills     LORazepam (ATIVAN) 0.5 MG tablet 30 tablet 0     Sig: Take 1 tablet (0.5 mg total) by mouth daily as needed for anxiety.     Date Last Fill: 11/15/19  Requested Pharmacy: [facility]  Submit electronically to pharmacy  Controlled Substance Agreement on file:   Encounter-Level CSA Scan Date:    There are no encounter-level csa scan date.        Last office visit:  9/9/19

## 2021-06-09 ENCOUNTER — COMMUNICATION - HEALTHEAST (OUTPATIENT)
Dept: FAMILY MEDICINE | Facility: CLINIC | Age: 51
End: 2021-06-09

## 2021-06-09 DIAGNOSIS — B00.1 COLD SORE: ICD-10-CM

## 2021-06-09 NOTE — PROGRESS NOTES
FEMALE ADULT PREVENTIVE EXAM    CHIEF COMPLAINT:  Female preventive exam.    SUBJECTIVE:  Adela Stone is a 46 y.o. female who presents for her routine physical exam.    Patient would like to address the following concerns today: 1. Rash which is now on RLE - has traveled around.  2. She has had spotting of her menses this month.  3. Would like an opinion on probiotics.       GYNE HISTORY  Menses: slightly irregular last 2 months   Sexually Active: not currentlyna  Contraception: na  Last Pap: 1-16  Abnormal Pap: no  Vaginal Discharge: no      She  has a past medical history of Anxiety.    Lab Results   Component Value Date    WBC 6.1 01/08/2014    HGB 12.8 01/13/2016    HCT 41.2 01/08/2014    MCV 94 01/08/2014     01/08/2014     01/13/2016    K 4.5 01/13/2016    BUN 12 01/13/2016     Lab Results   Component Value Date    CHOL 185 01/13/2016    HDL 55 01/13/2016    LDLCALC 118 01/13/2016    TRIG 58 01/13/2016     Lab Results   Component Value Date    TSH 1.4 01/08/2014     BP Readings from Last 3 Encounters:   03/17/17 138/72   01/13/16 122/62   06/23/15 120/62       Surgeries:    Past Surgical History:   Procedure Laterality Date     SC DENTAL SURGERY PROCEDURE      Description: Oral Surgery Tooth Extraction;  Recorded: 08/07/2013;       Family History:  Her family history includes Cancer in her paternal grandfather and paternal grandmother; Diabetes in her father; Diabetes type II in her father; Hypertension in her brother, brother, father, maternal grandfather, maternal grandmother, and mother; Lung cancer in her maternal grandmother.    Social History:  She  reports that she has never smoked. She has never used smokeless tobacco. She reports that she drinks about 1.5 oz of alcohol per week  She reports that she does not use illicit drugs.    Medications:    Current Outpatient Prescriptions:      citalopram (CELEXA) 10 MG tablet, TAKE 2  TABLETS DAILY, Disp: 180 tablet, Rfl: 3     LORazepam (ATIVAN)  0.5 MG tablet, TAKE 1-2 TABLETS BY MOUTH THREE TIMES DAILY AS NEEDED. MUST LAST 30 DAYS, Disp: 60 tablet, Rfl: 0  HELD MEDICATIONS: None.    Allergies:  No latex allergies.  No Known Allergies         RISK BEHAVIOR & HEALTH HABITS  Self Breast Exam: no.  Regular Exercise: tries to .  Balanced diet: yes.  Seat Belt Use: yes  Calcium intake/Osteoporosis prevention: discussed calcium and Vitamin D .  Dexa: na  Colonoscopy:na  Mammogram: 12-14 DUE FOR THIS     Guns: NO  Sun Screen: YES  Dental Care: YES    REVIEW OF SYSTEMS:  Complete head to toe review of systems is otherwise negative except as above.    OBJECTIVE:  VITAL SIGNS:    Vitals:    03/17/17 0842   BP: 138/72     GENERAL:  Patient alert, in no acute distress.  EYES: PERRLA. Extraoccular movements intact, pupils equal, reactive to light and accommodation.  ENT:  Hearing grossly normal.  Normal appearance to ears and nose.  Bilateral TM s, external canals, oropharynx normal. Normal lips, gums and teeth.  Normal nasal mucosa, septum and turbinates.  NECK:  Supple, without thyromegaly or mass.  RESP:  Clear to auscultation without crackles, wheezes or distress.  Normal respiratory effort.   CV:  Regular rate and rhythm without murmurs, rubs or gallops.  Normal carotid, abdominal aorta, femoral and pedal pulses.  No varicosities or edema.  ABDOMEN:  Soft, non-tender, without hepatosplenomegaly, masses, or hernias.   BREASTS:  Nontender, without masses, nipple discharge, erythema, or axillary adenopathy.    :    External genitalia:  Normal without lesions.  Urethra: Normal appearing  Vagina: Normal without discharge  Cervix: Speculum exam - cervix visually normal. Sample taken for pap smear. Bimanual exam- cervix palpably normal.   Uterus:  Nontender, mobile, without masses  Ovaries: Normal without masses  LYMPHATIC: Normal palpation of neck, groin and axilla..  No lymphadenopathy.  No bruising.  NEURO:  Non-focal and intact.  PSYCHIATRIC:  Alert & oriented with  normal mood and affect.  Good judgment and insight.  SKIN:  Normal inspection and palpation.  MUSCULOSKELETAL: Normal gait and station.      ASSESSMENT & PLAN  Adela was seen today for annual exam.    Diagnoses and all orders for this visit:    Visit for screening mammogram  -     Mammo Screening Bilateral; Future    Cervical cancer screening  -     Gynecologic Cytology (PAP Smear)    Screening for deficiency anemia  -     Hemoglobin    Screening for metabolic disorder  -     Basic Metabolic Panel    Screening for hyperlipidemia  -     Lipid Cascade    Vitamin D deficiency  -     Vitamin D, Total (25-Hydroxy)    Other orders  -     Tdap vaccine,  6yo or older,  IM      General  Immunizations reviewed and updated .  Recommended adequate calcium intake/osteoporosis prevention.  Discussed colon cancer screening at age 50, 45 if -American.  Diet and exercise reviewed,   Referral to dermatology for her rash.

## 2021-06-11 NOTE — TELEPHONE ENCOUNTER
RN cannot approve Refill Request    RN can NOT refill this medication PCP messaged that patient is overdue for Labs and Office Visit. Last office visit: 6/12/2019 Luz Elena Alonzo MD Last Physical: 9/9/2019 Last MTM visit: Visit date not found Last visit same specialty: Visit date not found.  Next visit within 3 mo: Visit date not found  Next physical within 3 mo: Visit date not found      Svitlana Rosenberg, [facility] Triage/Med Refill 9/22/2020    Requested Prescriptions   Pending Prescriptions Disp Refills     desvenlafaxine succinate (PRISTIQ) 50 MG 24 hr tablet [Pharmacy Med Name: DESVENLAFAXINE SUCCNT ER 50 MG] 30 tablet 5     Sig: TAKE 1 TABLET BY MOUTH EVERY DAY       Venlafaxine/Desvenlafaxine Refill Protocol Failed - 9/21/2020 12:23 AM        Failed - LFT or AST or ALT in last year     Albumin   Date Value Ref Range Status   12/24/2014 3.9 3.5 - 5.0 g/dL Final     Bilirubin, Total   Date Value Ref Range Status   12/24/2014 0.5 0.0 - 1.0 mg/dL Final     Alkaline Phosphatase   Date Value Ref Range Status   12/24/2014 61 45 - 120 U/L Final     AST   Date Value Ref Range Status   12/24/2014 19 0 - 40 U/L Final     ALT   Date Value Ref Range Status   12/24/2014 23 0 - 45 U/L Final     Protein, Total   Date Value Ref Range Status   12/24/2014 7.3 6.0 - 8.0 g/dL Final                Failed - Fasting lipid cascade in last year     Cholesterol   Date Value Ref Range Status   09/09/2019 181 <=199 mg/dL Final     Triglycerides   Date Value Ref Range Status   09/09/2019 66 <=149 mg/dL Final     HDL Cholesterol   Date Value Ref Range Status   09/09/2019 58 >=50 mg/dL Final     LDL Calculated   Date Value Ref Range Status   09/09/2019 110 <=129 mg/dL Final     Patient Fasting > 8hrs?   Date Value Ref Range Status   09/09/2019 Yes  Final             Failed - PCP or prescribing provider visit in last year     Last office visit with prescriber/PCP: 6/12/2019 Luz Elena Alonzo MD OR same dept:  Visit date not found OR same specialty: Visit date not found  Last physical: 9/9/2019 Last MTM visit: Visit date not found   Next visit within 3 mo: Visit date not found  Next physical within 3 mo: Visit date not found  Prescriber OR PCP: Luz Elena Alonzo MD  Last diagnosis associated with med order: 1. Anxiety  - desvenlafaxine succinate (PRISTIQ) 50 MG 24 hr tablet [Pharmacy Med Name: DESVENLAFAXINE SUCCNT ER 50 MG]; TAKE 1 TABLET BY MOUTH EVERY DAY  Dispense: 30 tablet; Refill: 5    If protocol passes may refill for 12 months if within 3 months of last provider visit (or a total of 15 months).             Failed - Blood Pressure in last year     BP Readings from Last 1 Encounters:   09/09/19 120/90

## 2021-06-12 NOTE — PROGRESS NOTES
"Adela Stone is a 50 y.o. female who is being evaluated via a billable telephone visit.      The patient has been notified of following:     \"This telephone visit will be conducted via a call between you and your physician/provider. We have found that certain health care needs can be provided without the need for a physical exam.  This service lets us provide the care you need with a short phone conversation.  If a prescription is necessary we can send it directly to your pharmacy.  If lab work is needed we can place an order for that and you can then stop by our lab to have the test done at a later time.    Telephone visits are billed at different rates depending on your insurance coverage. During this emergency period, for some insurers they may be billed the same as an in-person visit.  Please reach out to your insurance provider with any questions.    If during the course of the call the physician/provider feels a telephone visit is not appropriate, you will not be charged for this service.\"    Patient has given verbal consent to a Telephone visit? Yes    What phone number would you like to be contacted at? [phone]    Patient would like to receive their AVS by AVS Preference: Joan.    Additional provider notes: no     SUBJECTIVE: Adela Stone is a 50 y.o. female with:  Chief Complaint   Patient presents with     Follow-up     Touchbase on meds. Needs the 90 Rx for Pristiq.    She was seen for her physical 1 year ago and started on Pristiq 50 mg daily for generalized anxiety.  She does feel the medicine is working but if she misses a day has side effects -dizziness / feels poorly.  She also has lorazepam which she takes rarely for acute anxiety - got 30 tabs in May and would like a refill.  No side effects on the medication.  Started a new job 1 year ago and she is very happy with the change- working from home.  Her children are healthy and doing well.  She has been doing well coping with COVID and events " of the year.  Her only worry is weaning off the Pristiq in the future.  She is not taking any vitamin D and has not tried L-theanine or mind-body skills that were suggested at her visit last year.    OBJECTIVE: no distress    Vitamin D, Total (25-Hydroxy)   Date Value Ref Range Status   07/30/2018 24.0 (L) 30.0 - 80.0 ng/mL Final          Assessment/Plan:  1. Generalized anxiety disorder  Stable.  Continue current meds.  Controlled substance contract to be sent to pt to sign for lorazepam 0.5 mg PRN #30 tabs to last 6 months.  F/U yearly.  - desvenlafaxine succinate (PRISTIQ) 50 MG 24 hr tablet; Take 1 tablet (50 mg total) by mouth daily.  Dispense: 90 tablet; Refill: 3  - LORazepam (ATIVAN) 0.5 MG tablet; Take 1 tablet (0.5 mg total) by mouth daily as needed for anxiety.  Dispense: 30 tablet; Refill: 0    2. Vitamin D deficiency  I recommend she start on vitamin D3 3000 international unit(s) daily.        Phone call duration:  12 minutes    Luz Elena Alvarado MA

## 2021-06-14 NOTE — PROGRESS NOTES
FEMALE ADULT PREVENTIVE EXAM    Adela was seen today for annual exam, flu vaccine and medication refill.    Diagnoses and all orders for this visit:    Routine general medical examination at a health care facility  Up to date with colon cancer screening.  She wishes to do repeat pap today.    Generalized anxiety disorder- Stable.  Continue on Pristiq for now.  -     desvenlafaxine succinate (PRISTIQ) 50 MG 24 hr tablet; Take 1 tablet (50 mg total) by mouth daily.    Visit for screening mammogram  -     Mammo Screening Bilateral; Future    Vitamin D deficiency  -     Vitamin D, Total (25-Hydroxy)    Encounter for screening for other metabolic disorders  -     Comprehensive Metabolic Panel  -     Lipid Cascade    Screening for cervical cancer  -     Gynecologic Cytology (PAP Smear)    Routine screening for STI (sexually transmitted infection)  -     HIV Antigen/Antibody Screening Cascade  -     Hepatitis C Antibody (Anti-HCV)    Kyphosis (acquired) (postural)  -     XR Cervical Spine 2 - 3 VWS; Future  -     XR Thoracic Spine 2 VWS; Future       CHIEF COMPLAINT:  Female preventive exam.    SUBJECTIVE:  Adela Stone is a 50 y.o. female who presents for her routine physical exam.    Patient would like to address the following concerns today:   1) Anxiety - Always has some anxiety.  Continues on Pristiq 50 mg daily.  Wonders about weaning off in the future.  2) Right toe- has some decreased sensation great toe.  Other toes are fine.  She did have surgery for ingrown toenail on one toe.  3) She feels she has poor posture.  Has had more of a kyphosis.  4) Has had a hemorrhoid since birth of her second child.  Hard to keep the anal area clean.      GYNE HISTORY  Menses: Last menses beginning January.  Sexually Active: no  Contraception: not needed  Last Pap: 2018 normal with negative HPV  Abnormal Pap: no      She  has a past medical history of Anxiety.    Lab Results   Component Value Date    WBC 6.1 01/08/2014    HGB 13.6  03/17/2017    HCT 41.2 01/08/2014    MCV 94 01/08/2014     01/08/2014     09/09/2019    K 4.7 09/09/2019    BUN 14 09/09/2019     Lab Results   Component Value Date    CHOL 181 09/09/2019    HDL 58 09/09/2019    LDLCALC 110 09/09/2019    TRIG 66 09/09/2019     Lab Results   Component Value Date    TSH 1.32 07/30/2018     BP Readings from Last 3 Encounters:   01/28/21 130/84   09/09/19 120/90   06/12/19 124/74       Surgeries:    Past Surgical History:   Procedure Laterality Date     DE DENTAL SURGERY PROCEDURE      Description: Oral Surgery Tooth Extraction;  Recorded: 08/07/2013;       Family History:  Her family history includes Cancer in her paternal grandfather and paternal grandmother; Diabetes in her father; Diabetes type II in her father; Hypertension in her brother, brother, father, maternal grandfather, maternal grandmother, and mother; Lung cancer in her maternal grandmother.    Social History:  She  reports that she has never smoked. She has never used smokeless tobacco. She reports current alcohol use of about 2.5 standard drinks of alcohol per week. She reports that she does not use drugs. .  Lives with 3 children.  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    Medications:    Current Outpatient Medications:      desvenlafaxine succinate (PRISTIQ) 50 MG 24 hr tablet, Take 1 tablet (50 mg total) by mouth daily., Disp: 90 tablet, Rfl: 3     LORazepam (ATIVAN) 0.5 MG tablet, Take 1 tablet (0.5 mg total) by mouth daily as needed for anxiety., Disp: 30 tablet, Rfl: 0  HELD MEDICATIONS: None.      Allergies:  No latex allergies.  No Known Allergies         RISK BEHAVIOR & HEALTH HABITS  Regular Exercise: no.  Balanced diet: Drinks bottle diet coke daily.  Calcium/vitamin D: vitamin D off and on  Stress management: no  Seat Belt Use: yes      Colonoscopy:2019 incomplete- has Cologuard at home  Mammogram: normal 2019    Dental Care: YES    REVIEW OF SYSTEMS:  Complete head to toe review of  systems is otherwise negative except as above.    OBJECTIVE:  VITAL SIGNS:    Vitals:    01/28/21 0907   BP: 130/84   Pulse:    Resp:    Temp:    SpO2:      GENERAL:  Patient alert, in no acute distress.  EYES: PERRLA. Extraoccular movements intact, pupils equal, reactive to light and accommodation.  Normal conjunctiva and lids.   ENT:  Hearing grossly normal.  Normal appearance to ears and nose.  Bilateral TM s, external canals, oropharynx normal. Normal lips, gums and teeth.   NECK:  Supple, without thyromegaly or mass.  RESP:  Clear to auscultation without crackles, wheezes or distress.  Normal respiratory effort.   CV:  Regular rate and rhythm without murmurs, rubs or gallops.  Normal pedal pulses.  No varicosities or edema.  ABDOMEN:  Soft, non-tender, without hepatosplenomegaly, masses, or hernias.   GYN:  Normal external genitalia.  Vagina pink with scant discharge.  Cervix pink with no cervical motion tenderness.  Uterus small, anteverted and non tender.  No adnexal tenderness or enlargement.   BREASTS:  Nontender, without masses, nipple discharge, erythema, or axillary adenopathy.    LYMPHATIC: No cervical or axillary lymphadenopathy.  No bruising.  NEURO:  CN II-XII intact, motor & sensory function all intact.  DTR and reflexes normal.  PSYCHIATRIC:  Alert & oriented with normal mood and affect.  Good judgment and insight.  SKIN:  Normal inspection and palpation.  MUSCULOSKELETAL: Normal gait and station.  - Spine / Ribs / Pelvis: Normal inspection, ROM, stability and strength: Spine, Head, Neck, Upper and Lower Extremities.  Right great toe - normal sensation to light touch.  Callus on later toe.    Luz Elena Alonzo

## 2021-06-14 NOTE — TELEPHONE ENCOUNTER
Central PA team  [phone]  Pool: HE PA MED ([id number])          PA has been initiated.       PA form completed and faxed insurance via Cover My Meds     Key:   [id number] - PA Case ID: [id number]     Medication:    Desvenlafaxine Succinate ER 50MG er tablets    Insurance:  [employer]         Response will be received via fax and may take up to 5-10 business days depending on plan

## 2021-06-16 PROBLEM — F41.1 GENERALIZED ANXIETY DISORDER: Status: ACTIVE | Noted: 2018-07-30

## 2021-06-16 NOTE — TELEPHONE ENCOUNTER
Telephone Encounter by Ambar Concepcion at 9/18/2019 12:48 PM     Author: Ambar Concepcion Service: -- Author Type: --    Filed: 9/18/2019 12:50 PM Encounter Date: 9/11/2019 Status: Signed    : Ambar Concepcion       PRIOR AUTHORIZATION DENIED    Denial Rational: Patient needs to try and fail alternatives listed below:            Appeal Information: If provider would like to appeal please provide a letter of medical necessity and route back to the PA team.

## 2021-06-16 NOTE — TELEPHONE ENCOUNTER
Telephone Encounter by Ambar Concepcion at 9/25/2019 12:28 PM     Author: Ambar Concepcion Service: -- Author Type: --    Filed: 9/25/2019 12:29 PM Encounter Date: 9/11/2019 Status: Signed    : Ambar Concepcion APPROVED:    Approval start date:08/25/2019  Approval end date:09/25/2020    Pharmacy has been notified of approval and will contact patient when medication is ready for pickup.

## 2021-06-16 NOTE — TELEPHONE ENCOUNTER
Telephone Encounter by Lois Shelton at 11/20/2019  4:04 PM     Author: Lois Shelton Service: -- Author Type: --    Filed: 11/20/2019  4:04 PM Encounter Date: 11/20/2019 Status: Signed    : Lois Shelton APPROVED:    Approval start date:10/21/19  Approval end date:11/19/20    Pharmacy has been notified of approval and will contact patient when medication is ready for pickup.

## 2021-06-17 NOTE — TELEPHONE ENCOUNTER
Telephone Encounter by Ambar Concepcion at 2/1/2021  8:39 AM     Author: Ambar Concepcion Service: -- Author Type: --    Filed: 2/1/2021  8:39 AM Encounter Date: 1/26/2021 Status: Signed    : Ambar Concepcion APPROVED:    Approval start date: 12/29/2020  Approval end date:  01/28/2022    Pharmacy has been notified of approval and will contact patient when medication is ready for pickup.

## 2021-06-17 NOTE — PATIENT INSTRUCTIONS - HE
Patient Instructions by Erwin Carl PT at 7/3/2019 10:30 AM     Author: Erwin Carl PT Service: -- Author Type: Physical Therapist    Filed: 7/3/2019 10:55 AM Encounter Date: 7/3/2019 Status: Signed    : Erwin Carl PT (Physical Therapist)        ELASTIC BAND ROWS     Holding elastic band with both hands, draw back the band as you bend your elbows. Keep your elbows near the side of your body.    ELASTIC BAND EXTENSION BILATERAL SHOULDER    While holding an elastic band with both arms in front of you with your elbows straight, pull the band downwards and back towards your side.     Hold 2 seconds. 10 reps.  1-2x/day.    *Make sure to open the chest, rolling shoulders back and down.

## 2021-06-17 NOTE — PATIENT INSTRUCTIONS - HE
Patient Instructions by Erwin Carl PT at 6/21/2019 10:30 AM     Author: Erwin Carl PT Service: -- Author Type: Physical Therapist    Filed: 6/21/2019 11:19 AM Encounter Date: 6/21/2019 Status: Signed    : Erwin Carl PT (Physical Therapist)        CHIN TUCK - SUPINE    While lying on your back, tuck your chin towards your chest and press the back of your head into the table.    Maintain contact of head with the surface you are lying on the entire time.    Hold 2-3 seconds. 15x.  2x/day.    RETRACTION / CHIN TUCK    Slowly draw your head back so that your ears line up with your shoulders.    Hold 1-2 seconds. 5-10 reps.  2x/day      SCAPULAR RETRACTIONS    Draw your shoulder blades back and down.    Hold 3 seconds.  10x.    3-4x/day.       Look at your hand   Extend your arm out to the side, slightly backward and down   As you extend your arm, turn your head away as if looking away from the arm. *Look straight ahead for now.  Get a nice, easy stretch (may get a few tingles...which is OK)   Return the hand to the front of the face   10-15 reps. 3-4x/day.         THORACIC VERTICAL FOAM ROLLER    Place a foam roller under your spine vertically while on the floor.  Make sure your head is also supported by the foam roller.  Place arms out to side for a chest stretch.    Relax open x1-2 minutes. 2x/day.    *Can also use a rolled up bath towel or yoga mat, if you do not have the foam roller.

## 2021-06-17 NOTE — TELEPHONE ENCOUNTER
Yana stated she accidentally took two desvenlafaxine today.      I transferred her to Poison Control.    COVID 19 Nurse Triage Plan/Patient Instructions    Please be aware that novel coronavirus (COVID-19) may be circulating in the community. If you develop symptoms such as fever, cough, or SOB or if you have concerns about the presence of another infection including coronavirus (COVID-19), please contact your health care provider or visit  [email or web address].    Disposition/Instructions    Home care recommended. Follow home care protocol based instructions.    Thank you for taking steps to prevent the spread of this virus.  o Limit your contact with others.  o Wear a simple mask to cover your cough.  o Wash your hands well and often.    Resources    [facility]: About COVID-19: [email or web address]    CDC: What to Do If You're Sick: www.cdc.gov/coronavirus/2019-ncov/about/steps-when-sick.html    CDC: Ending Home Isolation: www.cdc.gov/coronavirus/2019-ncov/hcp/disposition-in-home-patients.html     CDC: Caring for Someone: www.cdc.gov/coronavirus/2019-ncov/if-you-are-sick/care-for-someone.html     [facility]: Interim Guidance for Hospital Discharge to Home: www.health.[facility].[place]/diseases/coronavirus/hcp/hospdischarge.pdf    [facility] clinical trials (COVID-19 research studies): clinicalaffairs.[facility]-clinical-trials     Below are the COVID-19 hotlines at the [facility] ([facility]). Interpreters are available.   o For health questions: Call 724-891-2370 or 1-856.552.3470 (7 a.m. to 7 p.m.)  o For questions about schools and childcare: Call 760-152-6735 or 1-855.218.3739 (7 a.m. to 7 p.m.)       Reason for Disposition    DOUBLE DOSE (an extra dose or lesser amount) of prescription drug and NO symptoms (Exception: a double dose of antibiotics)    Additional Information    Negative: MORE THAN A DOUBLE DOSE of a prescription or over-the-counter (OTC) drug     Negative: DOUBLE DOSE (an extra dose or lesser amount) of over-the-counter (OTC) drug and any symptoms (e.g., dizziness, nausea, pain, sleepiness)    Negative: DOUBLE DOSE (an extra dose or lesser amount) of prescription drug and any symptoms (e.g., dizziness, nausea, pain, sleepiness)    Negative: Took another person's prescription drug    Protocols used: MEDICATION QUESTION CALL-A-[place]    Cristin ORTIZ RN FNA

## 2021-06-18 NOTE — PROGRESS NOTES
"OFFICE VISIT - FAMILY MEDICINE     ASSESSMENT AND PLAN     1. Closed displaced fracture of distal phalanx of left index finger with delayed healing, subsequent encounter  XR Finger Left 2 or More VWS    Ambulatory referral to Orthopedics   2. Anxiety  LORazepam (ATIVAN) 0.5 MG tablet   3. Visit for screening mammogram  Mammo Screening Bilateral   Fracture left index distal phalanx, confirmed by x-ray independently reviewed today, she was referred to orthopedist to determine about further treatment, she will continue to use her splint.  Alprazolam was refilled to help control her anxiety, possible side effect discussed with her, she will make a follow-up appointment with a new primary care physician Dr. Alonzo.    CHIEF COMPLAINT   Hand Injury (\"BROKEN L 2ND DIGIT\"); Medication Refill (06/05); Concerns (\"PRESSURE, HEAD, NECK\"); and Mass (NECK)    HPI   Adela Stone is a 48 y.o. female.  No Patient Care Coordination Note on file.    She had a fracture of her left distal index finger while playing softball about 3 weeks ago, she was seen at a local ER, initially they thought  that she had a dislocation, attempted to relocated the finger was not possible and a subsequent x-ray that did show fracture she was given a splint, she still wearing it but the finger is still deformed.  She is having some mild throbbing pain she denies any new numbness or tingling.  She has anxiety, has been taking alprazolam as needed she is asking for refill.  She is planning to follow with Dr. Alonzo to get her physical exam.  Closed displaced  Review of Systems As per HPI, otherwise negative.    OBJECTIVE   /64 (Patient Site: Left Arm)  Pulse 64  Temp 99.2  F (37.3  C) (Oral)   Resp 13  Ht 5' 2.25\" (1.581 m)  Wt 152 lb (68.9 kg)  LMP 05/22/2018 (Approximate)  BMI 27.58 kg/m2  Physical Exam   Constitutional: She is oriented to person, place, and time. She appears well-developed and well-nourished.   HENT:   Head: " Normocephalic and atraumatic.   Cardiovascular: Normal rate, regular rhythm, normal heart sounds and intact distal pulses.  Exam reveals no gallop and no friction rub.    No murmur heard.  Pulmonary/Chest: Effort normal and breath sounds normal. No respiratory distress. She has no wheezes. She has no rales.   Musculoskeletal: She exhibits edema and tenderness. She exhibits no deformity (Left distal index finger.).   Neurological: She is alert and oriented to person, place, and time. Coordination normal.   Psychiatric: She has a normal mood and affect. Judgment and thought content normal.       On license of [facility]     Family History   Problem Relation Age of Onset     Hypertension Brother      Hypertension Brother      Diabetes type II Father      Hypertension Father      Diabetes Father      Lung cancer Maternal Grandmother      Hypertension Maternal Grandmother      Hypertension Mother      Hypertension Maternal Grandfather      Cancer Paternal Grandmother      Cancer Paternal Grandfather      Social History     Social History     Marital status:      Spouse name: N/A     Number of children: N/A     Years of education: N/A     Occupational History           Social History Main Topics     Smoking status: Never Smoker     Smokeless tobacco: Never Used     Alcohol use 1.5 oz/week     3 drink(s) per week     Drug use: No     Sexual activity: Yes     Partners: Male     Birth control/ protection: None      Comment:   4 para 3 is a      Other Topics Concern     Not on file     Social History Narrative     gavida 4 para 3 works for [employer]     Relevant history was reviewed with the patient today, unless noted in HPI, nothing is pertinent for this visit.  [facility]     Patient Active Problem List    Diagnosis Date Noted     Vitamin D Deficiency      Overview Note:     Created by [employer]    Replacement Utility updated for latest IMO load       Rosacea      Overview Note:      Created by Conversion         Palpitations      Overview Note:     Created by Conversion         Depression With Anxiety      Overview Note:     Created by Conversion         Anxiety      Overview Note:     Created by Conversion    Replacement Utility updated for latest IMO load       Past Surgical History:   Procedure Laterality Date     CT DENTAL SURGERY PROCEDURE      Description: Oral Surgery Tooth Extraction;  Recorded: 08/07/2013;       RESULTS/CONSULTS (Lab/Rad)   No results found for this or any previous visit (from the past 168 hour(s)).  Xr Finger Left 2 Or More Vws    Result Date: 6/21/2018  XR FINGER LEFT 2 OR MORE VWS 6/21/2018 2:10 PM INDICATION: Fracture ,pain compare with xray 6/5 at [facility] COMPARISON: No comparisons submitted. FINDINGS: There is a comminuted, intra-articular fracture at the base of the distal phalanx of the left index finger. There is approximately 3 mm of  dorsal displacement of the distal phalanx at the DIP joint on the lateral view with associated soft tissue swelling. No obvious callus formation.    MEDICATIONS     Current Outpatient Prescriptions on File Prior to Visit   Medication Sig Dispense Refill     citalopram (CELEXA) 10 MG tablet Take 2 tablets (20 mg total) by mouth daily. 180 tablet 2     No current facility-administered medications on file prior to visit.        HEALTH MAINTENANCE / SCREENING   PHQ-2 Total Score: 3 (6/21/2018  2:00 PM), PHQ-9 Total Score: 7 (6/21/2018  2:00 PM),PARIS-7 Total: 11 (6/21/2018  2:00 PM)  Immunization History   Administered Date(s) Administered     DT (pediatric) 01/01/1996     Influenza, inj, historic,unspecified 11/03/2008     Influenza,seasonal, Inj IIV3 02/05/2007, 11/03/2008, 09/29/2012     Td,adult,historic,unspecified 07/11/2007     Tdap 07/11/2007, 03/17/2017     Health Maintenance   Topic     MAMMOGRAM      ADVANCE DIRECTIVES DISCUSSED WITH PATIENT      INFLUENZA VACCINE RULE BASED (Season Ended)     DEPRESSION FOLLOW UP      PAP  SMEAR      TDAP ADULT ONE TIME DOSE      TD 18+ HE        Keon Mcgee MD  Family Medicine, [facility]     This note was dictated using a voice recognition software.  Any grammatical or context distortion are unintentional and inherent to the software.

## 2021-06-19 NOTE — LETTER
Letter by Luz Elena Alonzo MD at      Author: Luz Elena Alonzo MD Service: -- Author Type: --    Filed:  Encounter Date: 9/9/2019 Status: (Other)         [facility] FAMILY MEDICINE/OB  09/09/19    Patient: Adela Stone  YOB: 1970  Medical Record Number: [id number]  CSN: [id number]                                                                              Non-opioid Controlled Substance Agreement    I understand that my care provider has prescribed a controlled substance to help manage my condition(s). I am taking this medicine to help me function or work. I know this is strong medicine, and that it can cause serious side effects. Controlled substances can be sedating, addicting and may cause a dependency on the drug. They can affect my ability to drive or think, and cause depression. They need to be taken exactly as prescribed. Combining controlled substances with certain medicines or chemicals (such as cocaine, sedatives and tranquilizers, sleeping pills, meth) can be dangerous or even fatal. Also, if I stop controlled substances suddenly, I may have severe withdrawal symptoms.  If not helpful, I may be asked to stop them.    The risks, benefits, and side effects of these medicine(s) were explained to me. I agree that:    1. I will take part in other treatments as advised by my care team. This may be psychiatry or counseling, physical therapy, behavioral therapy, group treatment or a referral to a pain clinic. I will reduce or stop my medicine when my care team tells me to do so.  2. I will take my medicines as prescribed. I will not change the dose or schedule unless my care team tells me to. There will be no refills if I run out early.  I may be contactedwithout warning and asked to complete a urine drug test or pill count at any time.   3. I will keep all my appointments, and understand this is part of the monitoring of controlled substances. My care team may require an  office visit for EVERY controlled substance refill. If I miss appointments or dont follow instructions, my care team may stop my medicine.  4. I will not ask other providers to prescribe controlled substances, and I will not accept controlled substances from other people. If I need another prescribed controlled substance for a new reason, I will tell my care team within 1 business day.  5. I will use one pharmacy to fill all of my controlled substance prescriptions, and it is up to me to make sure that I do not run out of my medicines on weekends or holidays. If my care team is willing to refill my controlled substance prescription without a visit, I must request refills only during office hours, refills may take up to 3 days to process, and it may take up to 5 to 7 days for my medicine to be mailed and ready at my pharmacy. Prescriptions will not be mailed anywhere except my pharmacy.    6. I am responsible for my prescriptions. If the medicine/prescription is lost or stolen, it will not be replaced. I also agree not to share controlled substance medicines with anyone.          [facility] FAMILY MEDICINE/OB  09/09/19  Patient:  Adela Stone  YOB: 1970  Medical Record Number: [id number]  CSN: [id number]    7. I agree to not use ANY illegal or recreational drugs. This includes marijuana, cocaine, bath salts or other drugs. I agree not to use alcohol unless my care team says I may. I agree to give urine samples whenever asked. If I dont give a urine sample, the care team may stop my medicine.    8. If I enroll in the [place] [facility] program, I will tell my care team. I will also sign an agreement to share my medical records with my care team.    9. I will bring in my list of medicines (or my medicine bottles) each time I come to the clinic.   10. I will tell my care team right away if I become pregnant or have a new medical problem treated outside of my regular clinic.  11. I understand  that this medicine can affect my thinking and judgment. It may be unsafe for me to drive, use machinery and do dangerous tasks. I will not do any of these things until I know how the medicine affects me. If my dose changes, I will wait to see how it affects me. I will contact my care team if I have concerns about medicine side effects.    I understand that if I do not follow any of the conditions above, my prescriptions or treatment may be stopped.      I agree that my provider, clinic care team, and pharmacy may work with any city, state or federal law enforcement agency that investigates the misuse, sale, or other diversion of my controlled medicine. I will allow my provider to discuss my care with or share a copy of this agreement with any other treating provider, pharmacy or emergency room where I receive care. I agree to give up (waive) any right of privacy or confidentiality with respect to these consents.   I have read this agreement and have asked questions about anything I did not understand.    Lorazepam 0.5 mg daily as needed for panic attacks/   #30 for 3 months    ___________________________________________________________________________  Patient signature - Date/Time  -Adela Stone                                      ___________________________________________________________________________  Witness signature                                                                    ___________________________________________________________________________  Provider signature- Luz Elena Alonzo MD

## 2021-06-19 NOTE — LETTER
Letter by Luz Elena Alonzo MD at      Author: Luz Elena Alonzo MD Service: -- Author Type: --    Filed:  Encounter Date: 9/30/2019 Status: Signed         Adela Stone  [address] [address]      September 30, 2019      Dear Ms. Stone,     At [facility], we care about your health and well-being. Your primary care provider is committed to ensuring you receive high quality care and has chosen a network of specialists to assist in providing that care. Recently  referred you to [facility] for specialty care.        It is important to your overall health to follow through with the recommendation from your provider. Please call [phone] at your earliest convenience for assistance in scheduling an appointment.  If you have already scheduled this appointment, please disregard this notice.        Sincerely,        [facility] Specialty Scheduling

## 2021-06-19 NOTE — LETTER
Letter by Luz Elena Alonzo MD at      Author: Luz Elena Alonzo MD Service: -- Author Type: --    Filed:  Encounter Date: 2019 Status: Signed       2019   Adela Stone    1970     To whom it may concern:    I am the primary care physician caring for Adela Stone.  She is being treated for generalized anxiety.  I am requesting that Pristiq be covered for this patient based on [employer] pharmacogenetic testing.  This testing showed that  Wellbutrin / Remeron / Trazodone / Effexor had moderate to significant gene/ drug interactions.  She tried citalopram and it was not successful in treating her symptoms.    Sincerely,    Luz Elena Alonzo MD

## 2021-06-19 NOTE — PROGRESS NOTES
FEMALE ADULT PREVENTIVE EXAM    CHIEF COMPLAINT:  Female preventive exam.    SUBJECTIVE:  Adela Stone is a 48 y.o. female who presents for her routine physical exam.    Patient would like to address the following concerns today:   1) Anxiety - Takes citalopram 20 mg daily.  Having more anxiety now since job was eliminated.  Was on Paxil in past but had side effects.  Has used lorazepam PRN. Only used 30 last year.  More recently- used 5 tabs in past month due to job loss.  Had some counseling in past but not lot of help. Would like to wean off anxiety meds in future but with job change too uncertain now.  2) Has white spot on right eyelid  3) Has some skin lesions on chest / right buttock  4) Memory seems worse especially under stress.  Has trouble remembering names.      GYNE HISTORY  Menses: monthly  Sexually Active: no  Contraception: not needed.  Last Pap: 2017 normal with negative HPV  Abnormal Pap: no      She  has a past medical history of Anxiety.    Lab Results   Component Value Date    WBC 6.1 01/08/2014    HGB 13.6 03/17/2017    HCT 41.2 01/08/2014    MCV 94 01/08/2014     01/08/2014     03/17/2017    K 4.7 03/17/2017    BUN 12 03/17/2017     Lab Results   Component Value Date    CHOL 191 03/17/2017    HDL 52 03/17/2017    LDLCALC 124 03/17/2017    TRIG 74 03/17/2017     Lab Results   Component Value Date    TSH 1.4 01/08/2014     BP Readings from Last 3 Encounters:   07/30/18 138/88   06/21/18 102/64   03/17/17 138/72       Surgeries:    Past Surgical History:   Procedure Laterality Date     LA DENTAL SURGERY PROCEDURE      Description: Oral Surgery Tooth Extraction;  Recorded: 08/07/2013;       Family History:  Her family history includes Cancer in her paternal grandfather and paternal grandmother; Diabetes in her father; Diabetes type II in her father; Hypertension in her brother, brother, father, maternal grandfather, maternal grandmother, and mother; Lung cancer in her maternal  grandmother.    Social History:  She  reports that she has never smoked. She has never used smokeless tobacco. She reports that she drinks about 1.5 oz of alcohol per week  She reports that she does not use illicit drugs. .  3 children - ages 13/11/5 yo.  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    Medications:    Current Outpatient Prescriptions:      citalopram (CELEXA) 10 MG tablet, Take 2 tablets (20 mg total) by mouth daily., Disp: 180 tablet, Rfl: 2     LORazepam (ATIVAN) 0.5 MG tablet, Take 1 tablet (0.5 mg total) by mouth daily as needed for anxiety., Disp: 30 tablet, Rfl: 0  HELD MEDICATIONS: None.    Allergies:  No latex allergies.  No Known Allergies         RISK BEHAVIOR & HEALTH HABITS  Regular Exercise: no.  Balanced diet: yes.  Seat Belt Use: yes  Calcium/Vitamin D: no.  Stress management: no    Guns: NO  Dental Care: YES    REVIEW OF SYSTEMS:  Complete head to toe review of systems is otherwise negative except as above.    OBJECTIVE:  VITAL SIGNS:    Vitals:    07/30/18 0917   BP: 138/88   Pulse: 74   Resp: 14     GENERAL:  Patient alert, in no acute distress.  EYES: PERRLA. Extraoccular movements intact, pupils equal, reactive to light and accommodation.  Normal conjunctiva and lids.    ENT:  Hearing grossly normal.  Normal appearance to ears and nose.  Bilateral TM s, external canals, oropharynx normal. Normal lips, gums and teeth.    NECK:  Supple, without thyromegaly or mass.  RESP:  Clear to auscultation without crackles, wheezes or distress.  Normal respiratory effort.   CV:  Regular rate and rhythm without murmurs, rubs or gallops.  Normal pedal pulses.  No varicosities or edema.  ABDOMEN:  Soft, non-tender, without hepatosplenomegaly, masses, or hernias.   BREASTS:  Nontender, without masses, nipple discharge, erythema, or axillary adenopathy.    :    External genitalia:  Normal without lesions.  Urethra: Normal appearing  Vagina: Normal  without discharge  Cervix: Pink.  No CMT.  Uterus:  Nontender, mobile, without masses  Ovaries: Normal without masses  LYMPHATIC: Normal palpation of neck.  No lymphadenopathy.  No bruising.  NEURO:  CN II-XII intact, motor & sensory function all intact.  DTR and reflexes normal.  PSYCHIATRIC:  Alert & oriented with normal mood and affect.  Good judgment and insight.  SKIN:  Normal inspection and palpation.  Tan macule on chest.  2 mm white papule on right upper lid.  MUSCULOSKELETAL: Normal gait and station.  - Spine / Ribs / Pelvis: Normal inspection, ROM, stability and strength: Spine, Head, Neck, Upper and Lower Extremities.    Adela was seen today for annual exam and establish care.    Diagnoses and all orders for this visit:    Routine general medical examination at a health care facility  Recommend 3D mammogram next year.    Screening for cervical cancer  -     Gynecologic Cytology (PAP Smear)    Vitamin D deficiency  -     Vitamin D, Total (25-Hydroxy)    Screening for metabolic disorder  -     Lipid Cascade  -     Basic Metabolic Panel    Generalized anxiety disorder - Discussed options- Gene Sight testing/ increase citalopram / supplements.  She opted to check Gene Sight pharmacologic testing first before change in dose.  I recommend she consider trial of L-theanine.  OK to use lorazepam PRN- has controlled substance contract.  -     Thyroid Cascade  -     citalopram (CELEXA) 20 MG tablet; Take 1 tablet (20 mg total) by mouth daily.           Luz Elena Alonzo

## 2021-06-19 NOTE — LETTER
Letter by Jazzy Porter at      Author: Jazzy Porter Service: -- Author Type: --    Filed:  Encounter Date: 10/16/2019 Status: Signed         October 16, 2019       Adela MARTINEZ Bertha  [address]  [facility] [address]    Dear Adela Stone:    We are pleased to provide you with secure, online access to medical information for you and your family within [facility]. Per your request, we have expanded your account to allow access to the records of the following family members:              Cynthia Stone (privilege ends on 10/16/2020.)                How Do I Log In?  1. In your Internet browser, go to [email or web address]  2. Log into [employer] using your [employer] Username and Password.  3. Click Sign In.        How Do I Access a Family Member's Account?  4. Select the account you want to access by clicking the Nooksack with the appropriate patient's name at the top of your screen.   5. You will see a disclaimer page letting you know that you will be viewing a family member's record. Review the disclaimer and then click Accept Proxy Access Disclaimer to proceed.  6. Once you switch to viewing a family member's record, you can navigate to [employer] pages the same way you would for yourself. You can return to your own account by clicking the Nooksack at the top of the screen with your name on it.    7. To customize the colors and names of the linked accounts, you can select Personalize from the Profile dropdown menu at the top of the screen, then click the Edit button to make changes.     Additional Information  If you have questions, visit [email or web address], e-mail [email or web address] or call [phone] to talk to our [employer] staff. Remember, [employer] is NOT to be used for urgent needs. For medical emergencies, dial 911.

## 2021-06-21 NOTE — LETTER
Letter by Luz Elena Alonzo MD at      Author: Luz Elena Alonzo MD Service: -- Author Type: --    Filed:  Encounter Date: 1/28/2021 Status: (Other)                    My Depression Action Plan  Name: Adela Stone   Date of Birth 1970  Date: 1/28/2021    My Doctor: Luz Elena Alonzo MD   My Clinic: [address] [address]  [phone]          GREEN    ZONE   Good Control    What it looks like:     Things are going generally well. You have normal ups and downs. You may even feel depressed from time to time, but bad moods usually last less than a day.   What you need to do:  1. Continue to care for yourself (see self care plan)  2. Check your depression survival kit and update it as needed  3. Follow your physicians recommendations including any medication.  4. Do not stop taking medication unless you consult with your physician first.           YELLOW         ZONE Getting Worse    What it looks like:     Depression is starting to interfere with your life.     It may be hard to get out of bed; you may be starting to isolate yourself from others.    Symptoms of depression are starting to last most all day and this has happened for several days.     You may have suicidal thoughts but they are not constant.   What you need to do:     1. Call your care team. Your response to treatment will improve if you keep your care team informed of your progress. Yellow periods are signs an adjustment may need to be made.     2. Continue your self-care.  Just get dressed and ready for the day.  Don't give yourself time to talk yourself out of it.    3. Talk to someone in your support network.    4. Open up your depression Depression Self-Care Plan / Wellness kit.           RED    ZONE Medical Alert - Get Help    What it looks like:     Depression is seriously interfering with your life.     You may experience these or other symptoms: You  cant get out of bed most days, cant work or engage in other necessary activities, you have trouble taking care of basic hygiene, or basic responsibilities, thoughts of suicide or death that will not go away, self-injurious behavior.     What you need to do:  1. Call your care team and request a same-day appointment. If they are not available (weekends or after hours) call your local crisis line, emergency room or 911.          Depression Self-Care Plan / Wellness Kit    Many people find that medication and therapy are helpful treatments for managing depression. In addition, making small changes to your everyday life can help to boost your mood and improve your wellbeing. Below are some tips for you to consider. Be sure to talk with your medical provider and/or behavioral health consultant if your symptoms are worsening or not improving.     Sleep  Sleep hygiene means all of the habits that support good, restful sleep. It includes maintaining a consistent bedtime and wake time, using your bedroom only for sleeping or sex, and keeping the bedroom dark and free of distractions like a computer, smartphone, or television.     Develop a Healthy Routine  Maintain good hygiene. Get out of bed in the morning, make your bed, brush your teeth, take a shower, and get dressed. Dont spend too much time viewing media that makes you feel stressed. Find time to relax each day.    Exercise  Get some form of exercise every day. This will help reduce pain and release endorphins, the feel good chemicals in your brain. It can be as simple as just going for a walk or doing some gardening, anything that will get you moving.      Diet  Strive to eat healthy foods, including fruits and vegetables. Drink plenty of water. Avoid excessive sugar, caffeine, alcohol, and other mood-altering substances.     Stay Connected with Others  Stay in touch with friends and family members.    Manage Your Mood  Try deep breathing, massage therapy,  biofeedback, or meditation. Take part in fun activities when you can. Try to find something to smile about each day.     Psychotherapy  Be open to working with a therapist if your provider recommends it.     Medication  Be sure to take your medication as prescribed. Most anti-depressants need to be taken every day. It usually takes several weeks for medications to work. Not all medicines work for all people. It is important to follow-up with your provider to make sure you have a treatment plan that is working for you. Do not stop your medication abruptly without first discussing it with your provider.    Crisis Resources   These hotlines are for both adults and children. They and are open 24 hours a day, 7 days a week unless noted otherwise.      National Suicide Prevention Lifeline   9-913-528-TALK (6545)      Crisis Text Line    www.crisistextline.org  Text HOME to 736593 from anywhere in the [place], anytime, about any type of crisis. A live, trained crisis counselor will receive the text and respond quickly.      [place] Lifeline for LGBTQ Youth  A national crisis intervention and suicide lifeline for LGBTQ youth under 25. Provides a safe place to talk without judgement. Call 1-437.583.4672; text START to 940246 or visit www.thetrevorproject.org to talk to a trained counselor.      For [facility] crisis numbers, visit the [facility] website at:  https://[place].gov/dhs/people-we-serve/adults/health-care/mental-health/resources/crisis-contacts.jsp

## 2021-06-21 NOTE — LETTER
Letter by Luz Elena Alonzo MD at      Author: Luz Elena Alonzo MD Service: -- Author Type: --    Filed:  Encounter Date: 10/26/2020 Status: (Other)         [facility]  10/26/20    Patient: Adela Stone  YOB: 1970  Medical Record Number: [id number]  CSN: [id number]                                                                              Non-opioid Controlled Substance Agreement    I understand that my care provider has prescribed a controlled substance to help manage my condition(s). I am taking this medicine to help me function or work. I know this is strong medicine, and that it can cause serious side effects. Controlled substances can be sedating, addicting and may cause a dependency on the drug. They can affect my ability to drive or think, and cause depression. They need to be taken exactly as prescribed. Combining controlled substances with certain medicines or chemicals (such as cocaine, sedatives and tranquilizers, sleeping pills, meth) can be dangerous or even fatal. Also, if I stop controlled substances suddenly, I may have severe withdrawal symptoms.  If not helpful, I may be asked to stop them.    The risks, benefits, and side effects of these medicine(s) were explained to me. I agree that:    1. I will take part in other treatments as advised by my care team. This may be psychiatry or counseling, physical therapy, behavioral therapy, group treatment or a referral to a pain clinic. I will reduce or stop my medicine when my care team tells me to do so.  2. I will take my medicines as prescribed. I will not change the dose or schedule unless my care team tells me to. There will be no refills if I run out early.  I may be contactedwithout warning and asked to complete a urine drug test or pill count at any time.   3. I will keep all my appointments, and understand this is part of the monitoring of controlled substances. My care team may require an  office visit for EVERY controlled substance refill. If I miss appointments or dont follow instructions, my care team may stop my medicine.  4. I will not ask other providers to prescribe controlled substances, and I will not accept controlled substances from other people. If I need another prescribed controlled substance for a new reason, I will tell my care team within 1 business day.  5. I will use one pharmacy to fill all of my controlled substance prescriptions, and it is up to me to make sure that I do not run out of my medicines on weekends or holidays. If my care team is willing to refill my controlled substance prescription without a visit, I must request refills only during office hours, refills may take up to 3 days to process, and it may take up to 5 to 7 days for my medicine to be mailed and ready at my pharmacy. Prescriptions will not be mailed anywhere except my pharmacy.    6. I am responsible for my prescriptions. If the medicine/prescription is lost or stolen, it will not be replaced. I also agree not to share controlled substance medicines with anyone.          [facility]  10/26/20  Patient:  Adela Stone  YOB: 1970  Medical Record Number: [id number]  CSN: [id number]    7. I agree to not use ANY illegal or recreational drugs. This includes marijuana, cocaine, bath salts or other drugs. I agree not to use alcohol unless my care team says I may. I agree to give urine samples whenever asked. If I dont give a urine sample, the care team may stop my medicine.    8. If I enroll in the [place] Medical Marijuana program, I will tell my care team. I will also sign an agreement to share my medical records with my care team.    9. I will bring in my list of medicines (or my medicine bottles) each time I come to the clinic.   10. I will tell my care team right away if I become pregnant or have a new medical problem treated outside of my regular clinic.  11. I understand that  this medicine can affect my thinking and judgment. It may be unsafe for me to drive, use machinery and do dangerous tasks. I will not do any of these things until I know how the medicine affects me. If my dose changes, I will wait to see how it affects me. I will contact my care team if I have concerns about medicine side effects.    I understand that if I do not follow any of the conditions above, my prescriptions or treatment may be stopped.      I agree that my provider, clinic care team, and pharmacy may work with any city, state or federal law enforcement agency that investigates the misuse, sale, or other diversion of my controlled medicine. I will allow my provider to discuss my care with or share a copy of this agreement with any other treating provider, pharmacy or emergency room where I receive care. I agree to give up (waive) any right of privacy or confidentiality with respect to these consents.   I have read this agreement and have asked questions about anything I did not understand.    Lorazepam 0.5 mg daily as needed for acute anxiety  #30 for 6 months  Annual visits    ___________________________________________________________________________  Patient signature - Date/Time  -Adela Stone                                      ___________________________________________________________________________  Witness signature                                                                    ___________________________________________________________________________  Provider signature- Luz Elena Alonzo MD

## 2021-06-25 NOTE — PROGRESS NOTES
SUBJECTIVE: Adela Stone is a 48 y.o. female with:  Chief Complaint   Patient presents with     Medication check     Tachycardia     life line screening    1) Anxiety - Has been on citalopram 20 mg for a couple of years but not working well.  Her dermatologist thinks rash is due to citalopram.  Was on Paxil in past but had some side effects.  She worries all time.  Also has social anxiety.  Gets occasional panic attacks.  Worse when she has her menses but always there.  She is not working but at end of her severence pay.  Not doing any counseling at this time.  No exercise.  Trying to work on diet- stress eats at times.  Caffeine- none.  Alcohol- none.  She does have lorazepam but uses rarely - once a month.    2) She saw life line screening on TV and wonders if she should do the tests.    SH: . Has 3 children.  Currently not working.    OBJECTIVE: Psych Exam:  Mood: anxious  Affect: normal   Behavior: appropriate  ThoughtContent: logical  Judgement: appropriate  Insight: normal    PHQ-9 Total Score: 15 (3/18/2019 10:00 AM)    PARIS-7 Total: 13 (7/30/2018 10:00 AM)  PARIS 7 Total Score: 13 (3/18/2019 10:00 AM)    Adela was seen today for medication check and tachycardia.    Diagnoses and all orders for this visit:    Generalized anxiety disorder       Patient Instructions   Cut down on citalopram to 10 mg daily and update me in 1 month to decide if we can wean further.    I recommend a product called Cerevive made by [employer].  This has a number of synergistic nutrients and amino acid precursors to help your brain establish a calm and positive mental state. Do not take this with any prescription antidepressants or anti-anxiety medications without discussing with a physician.  I recommend starting out with 1 tablet 2x a day for the first month and if no side effects or response you can increase to 2 tablets 2x a day.  Watch out for sedation.  You can purchase this at [facility].    I  recommend mind-body skills to help with anxiety.     Luz Elena Alonzo

## 2021-06-25 NOTE — PATIENT INSTRUCTIONS - HE
Cut down on citalopram to 10 mg daily and update me in 1 month to decide if we can wean further.    I recommend a product called Cerevive made by [employer].  This has a number of synergistic nutrients and amino acid precursors to help your brain establish a calm and positive mental state. Do not take this with any prescription antidepressants or anti-anxiety medications without discussing with a physician.  I recommend starting out with 1 tablet 2x a day for the first month and if no side effects or response you can increase to 2 tablets 2x a day.  Watch out for sedation.  You can purchase this at [facility].    I recommend mind-body skills to help with anxiety.

## 2021-07-03 NOTE — ADDENDUM NOTE
Addendum Note by Luz Elena Casiano MD at 5/15/2020 12:31 PM     Author: Luz Elena Casiano MD Service: -- Author Type: Physician    Filed: 5/15/2020 12:31 PM Encounter Date: 5/13/2020 Status: Signed    : Luz Elena Casiano MD (Physician)    Addended by: LUZ ELENA CASIANO on: 5/15/2020 12:31 PM        Modules accepted: Orders

## 2021-07-13 ENCOUNTER — RECORDS - HEALTHEAST (OUTPATIENT)
Dept: ADMINISTRATIVE | Facility: CLINIC | Age: 51
End: 2021-07-13

## 2021-07-21 ENCOUNTER — RECORDS - HEALTHEAST (OUTPATIENT)
Dept: ADMINISTRATIVE | Facility: CLINIC | Age: 51
End: 2021-07-21

## 2021-10-10 ENCOUNTER — HEALTH MAINTENANCE LETTER (OUTPATIENT)
Age: 51
End: 2021-10-10

## 2021-11-09 ENCOUNTER — HOSPITAL ENCOUNTER (EMERGENCY)
Facility: CLINIC | Age: 51
End: 2021-11-09
Payer: COMMERCIAL

## 2021-11-09 LAB
ANION GAP SERPL CALCULATED.3IONS-SCNC: 4 MMOL/L (ref 3–14)
BASOPHILS # BLD AUTO: 0.1 10E3/UL (ref 0–0.2)
BASOPHILS NFR BLD AUTO: 1 %
BUN SERPL-MCNC: 11 MG/DL (ref 7–30)
CALCIUM SERPL-MCNC: 8.6 MG/DL (ref 8.5–10.1)
CHLORIDE BLD-SCNC: 109 MMOL/L (ref 94–109)
CO2 SERPL-SCNC: 28 MMOL/L (ref 20–32)
CREAT SERPL-MCNC: 0.88 MG/DL (ref 0.52–1.04)
EOSINOPHIL # BLD AUTO: 0.3 10E3/UL (ref 0–0.7)
EOSINOPHIL NFR BLD AUTO: 3 %
ERYTHROCYTE [DISTWIDTH] IN BLOOD BY AUTOMATED COUNT: 12.9 % (ref 10–15)
GFR SERPL CREATININE-BSD FRML MDRD: 76 ML/MIN/1.73M2
GLUCOSE BLD-MCNC: 125 MG/DL (ref 70–99)
HCT VFR BLD AUTO: 39.2 % (ref 35–47)
HGB BLD-MCNC: 12.9 G/DL (ref 11.7–15.7)
IMM GRANULOCYTES # BLD: 0 10E3/UL
IMM GRANULOCYTES NFR BLD: 0 %
LYMPHOCYTES # BLD AUTO: 3.6 10E3/UL (ref 0.8–5.3)
LYMPHOCYTES NFR BLD AUTO: 41 %
MCH RBC QN AUTO: 30.5 PG (ref 26.5–33)
MCHC RBC AUTO-ENTMCNC: 32.9 G/DL (ref 31.5–36.5)
MCV RBC AUTO: 93 FL (ref 78–100)
MONOCYTES # BLD AUTO: 0.6 10E3/UL (ref 0–1.3)
MONOCYTES NFR BLD AUTO: 7 %
NEUTROPHILS # BLD AUTO: 4.1 10E3/UL (ref 1.6–8.3)
NEUTROPHILS NFR BLD AUTO: 48 %
NRBC # BLD AUTO: 0 10E3/UL
NRBC BLD AUTO-RTO: 0 /100
PLATELET # BLD AUTO: 298 10E3/UL (ref 150–450)
POTASSIUM BLD-SCNC: 3.8 MMOL/L (ref 3.4–5.3)
RBC # BLD AUTO: 4.23 10E6/UL (ref 3.8–5.2)
SODIUM SERPL-SCNC: 141 MMOL/L (ref 133–144)
WBC # BLD AUTO: 8.6 10E3/UL (ref 4–11)

## 2021-11-09 PROCEDURE — 80048 BASIC METABOLIC PNL TOTAL CA: CPT | Performed by: EMERGENCY MEDICINE

## 2021-11-09 PROCEDURE — 85025 COMPLETE CBC W/AUTO DIFF WBC: CPT | Performed by: EMERGENCY MEDICINE

## 2021-11-09 PROCEDURE — 99284 EMERGENCY DEPT VISIT MOD MDM: CPT

## 2021-11-09 PROCEDURE — 93005 ELECTROCARDIOGRAM TRACING: CPT

## 2021-11-09 PROCEDURE — 36415 COLL VENOUS BLD VENIPUNCTURE: CPT | Performed by: EMERGENCY MEDICINE

## 2021-11-09 PROCEDURE — 84484 ASSAY OF TROPONIN QUANT: CPT | Performed by: EMERGENCY MEDICINE

## 2021-11-10 ENCOUNTER — HOSPITAL ENCOUNTER (EMERGENCY)
Facility: CLINIC | Age: 51
Discharge: HOME | End: 2021-11-10
Attending: EMERGENCY MEDICINE | Admitting: EMERGENCY MEDICINE
Payer: COMMERCIAL

## 2021-11-10 VITALS
SYSTOLIC BLOOD PRESSURE: 153 MMHG | DIASTOLIC BLOOD PRESSURE: 104 MMHG | WEIGHT: 163 LBS | HEART RATE: 65 BPM | RESPIRATION RATE: 15 BRPM | BODY MASS INDEX: 29.57 KG/M2 | OXYGEN SATURATION: 95 % | TEMPERATURE: 97.8 F

## 2021-11-10 DIAGNOSIS — R42 DIZZINESS: ICD-10-CM

## 2021-11-10 DIAGNOSIS — F41.9 ANXIETY: ICD-10-CM

## 2021-11-10 LAB
ATRIAL RATE - MUSE: 91 BPM
DIASTOLIC BLOOD PRESSURE - MUSE: NORMAL MMHG
INTERPRETATION ECG - MUSE: NORMAL
P AXIS - MUSE: 38 DEGREES
PR INTERVAL - MUSE: 114 MS
QRS DURATION - MUSE: 74 MS
QT - MUSE: 392 MS
QTC - MUSE: 482 MS
R AXIS - MUSE: 31 DEGREES
SYSTOLIC BLOOD PRESSURE - MUSE: NORMAL MMHG
T AXIS - MUSE: 70 DEGREES
TROPONIN I SERPL-MCNC: <0.015 UG/L (ref 0–0.04)
VENTRICULAR RATE- MUSE: 91 BPM

## 2021-11-10 ASSESSMENT — ENCOUNTER SYMPTOMS
HEADACHES: 0
FEVER: 0
DIZZINESS: 1
LIGHT-HEADEDNESS: 1
COUGH: 0
NAUSEA: 1
TREMORS: 1

## 2021-11-10 NOTE — ED PROVIDER NOTES
History   Chief Complaint:  Dizziness     HPI   Adela Stone is a 51 year old female with history of anxiety who presents with dizziness. The patient reports that at around 2100 last night she went to lay down after eating and began feeling very dizzy. The patient then tried to get up and get some fresh air and began intermittently feeling lightheaded, as if she was going to pass out. She also mentions having some body trembling. The patient came to the emergency department and notes that while she was waiting, the patient began having some nausea, but states that this has resolved and she almost feels back to normal. The patient has a history of anxiety and takes daily mediations for this, but states she is unsure if she took these medications today. She denies any fever, cough or headache.     Review of Systems   Constitutional: Negative for fever.   Respiratory: Negative for cough.    Gastrointestinal: Positive for nausea.   Neurological: Positive for dizziness, tremors and light-headedness. Negative for headaches.   All other systems reviewed and are negative.    Allergies:  The patient has no known allergies.     Medications:  Citalopram  Pristiq  Ativan    Past Medical History:     Anxiety  Vitamin D deficiency  Rosacea      Past Surgical History:    Tooth extraction    Family History:    Brother: hypertension  Father: type II diabetes, hypertension  Mother: hypertension    Social History:  The patient presents to the emergency department alone.       Physical Exam     Patient Vitals for the past 24 hrs:   BP Temp Temp src Pulse Resp SpO2 Weight   11/10/21 0300 -- -- -- 96 19 97 % --   11/10/21 0217 -- -- -- -- -- -- 73.9 kg (163 lb)   11/10/21 0210 (!) 153/104 -- -- 91 18 98 % --   11/09/21 2239 (!) 161/96 97.8  F (36.6  C) Temporal 100 16 99 % --       Physical Exam  General: Patient is awake, alert and interactive when I enter the room  Head: The scalp, face, and head appear normal  Eyes: The pupils are  equal, round, and reactive to light. Conjunctivae and sclerae are normal  Neck: Normal range of motion.   CV: Regular rate and rhythm.   Resp: Lungs are clear without wheezes or rales. No respiratory distress.   GI: Abdomen is soft, no rigidity, guarding, or rebound. No distension. No tenderness to palpation in any quadrant.     MS: Normal tone. Joints grossly normal without effusions. No asymmetric leg swelling, calf or thigh tenderness.    Skin: No rash or lesions noted. Normal capillary refill noted  Neuro: CN's II-XII without obvious abnormality.    5/5 UE and LE strength which is symmetrical.   Reflexes are 2+ and symmetrical. Negative Pronator drift.    Finger to Nose testing is intact bilaterally.    Light touch is symmetrical bilateral UE's and LE's.  PERRLA, EOMI.    No meningismus and full neck AROM/PROM.   Psych:  Normal affect.  Appropriate interactions.    Emergency Department Course   ECG  ECG obtained at 2248, ECG read at 0305  Normal sinus rhythm  Nonspecific ST abnormality  Prolonged QT   No significant changes as compared to prior, dated 10/22/2019.  Rate 91 bpm. AL interval 114 ms. QRS duration 74 ms. QT/QTc 392/482 ms. P-R-T axes 38 31 70.     Laboratory:  CBC: WBC 8.6, HGB 12.9,    BMP: Glucose 125 (H) o/w WNL (Creatinine 0.88)       Troponin (Collected 2253): <0.015     Emergency Department Course:  Reviewed:  I reviewed nursing notes, vitals and past medical history    Assessments:  0300 I obtained history and examined the patient as noted above.   0410 I rechecked the patient and explained findings.     Disposition:  The patient was discharged to home.     Impression & Plan     Medical Decision Making:  Adela Stone is a 51 year old female who presents with a feeling of light headedness and dizziness.  I considered a broad differential including cardiac arrythmia, ACS, aortic stenosis, HOCM, PE, orthostatic hypotension, anemia.  There is no murmur on exam making aortic stenosis and  HOCM unlikely. There are no malignant arrhthymias on EKG or during the patient's time on the monitor.  The patient does not appear clinically dehydrated and I would not expect this based on history.  The patient is not anemic and electrolytes are otherwise normal.  There are no signs of a concerning etiology for near syncope at this point.  The patient has no chest pain concerning for CAD, no seizure activity or post-ictal period, no murmur, and no focal neurologic symptoms and no complaints of concerning headache.  The workup in the ED is negative and the physical exam is re-assurring.  Supportive outpatient management is therefore indicated at this time.  Plans for outpatient PCP f/u over the next few days; return precautions given.      Diagnosis:    ICD-10-CM    1. Dizziness  R42    2. Anxiety  F41.9        Discharge Medications:  None     Scribe Disclosure:  Brad NANCE, am serving as a scribe at 1:44 AM on 11/10/2021 to document services personally performed by Durga Cummings MD based on my observations and the provider's statements to me.              Durga Cummings MD  11/10/21 5640

## 2021-11-10 NOTE — ED TRIAGE NOTES
Patient here with dizziness with nausea started one hour ago.She denies having a fever and no shortness. She denies having chest pain

## 2021-11-10 NOTE — ED NOTES
"  Agree with triage note.   Patient states earlier tonight she was feeling like she was \"trembling\", felt nauseous, and like she might pass out. States she feels like noises set her off. Hx of anxiety, took her medication tonight, in case she forgot to take it this morning. States currently is feeling less \"trembly\", but does feel like her head is throbbing. Denies hx of migraines.   "

## 2022-02-13 DIAGNOSIS — F41.1 GENERALIZED ANXIETY DISORDER: ICD-10-CM

## 2022-02-16 RX ORDER — DESVENLAFAXINE 50 MG/1
TABLET, FILM COATED, EXTENDED RELEASE ORAL
Qty: 30 TABLET | Refills: 0 | Status: SHIPPED | OUTPATIENT
Start: 2022-02-16 | End: 2022-03-14

## 2022-02-16 NOTE — TELEPHONE ENCOUNTER
"Outpatient Medication Detail     Disp Refills Start End AIME   desvenlafaxine succinate (PRISTIQ) 50 MG 24 hr tablet 90 tablet 3 1/28/2021  No   Sig - Route: Take 1 tablet (50 mg total) by mouth daily. - Oral   Sent to pharmacy as: desvenlafaxine succinate ER 50 mg tablet,extended release 24 hr (PRISTIQ)   Notes to Pharmacy: Future refills   E-Prescribing Status: Receipt confirmed by pharmacy (1/28/2021  9:31 AM CST)       desvenlafaxine succinate (PRISTIQ) 50 MG 24 hr tablet [[id number]]    Electronically signed by: Luz Elena Alonzo MD on 01/28/21 0930 Status: Active   Ordering user: Luz Elena Alonzo MD 01/28/21 0930 Authorized by: Luz Elena Alonzo MD   Frequency: DAILY 01/28/21 - Until Discontinued Released by: Luz Elena Alonzo MD 01/28/21 0930   Diagnoses  Generalized anxiety disorder [F41.1]   Medication comments: Future refills     Routing refill request to provider for review/approval because:  Patient needs to be seen because it has been more than 1 year since last office visit.  BP not in range.    Last office visit provider:  1/28/21     Requested Prescriptions   Pending Prescriptions Disp Refills     desvenlafaxine (PRISTIQ) 50 MG 24 hr tablet [Pharmacy Med Name: DESVENLAFAXINE SUCCNT ER 50 MG] 90 tablet 3     Sig: TAKE 1 TABLET BY MOUTH EVERY DAY       Serotonin-Norepinephrine Reuptake Inhibitors  Failed - 2/16/2022 10:05 AM        Failed - Blood pressure under 140/90 in past 12 months     BP Readings from Last 3 Encounters:   11/10/21 (!) 153/104   01/28/21 130/84   10/22/19 (!) 132/94                 Failed - Recent (12 mo) or future (30 days) visit within the authorizing provider's specialty     Patient has had an office visit with the authorizing provider or a provider within the authorizing providers department within the previous 12 mos or has a future within next 30 days. See \"Patient Info\" tab in inbasket, or \"Choose Columns\" in Meds & Orders section of the " refill encounter.              Passed - Medication is active on med list        Passed - Patient is age 18 or older        Passed - No active pregnancy on record        Passed - Normal serum creatinine on file in past 12 months     Recent Labs   Lab Test 11/09/21  2253   CR 0.88       Ok to refill medication if creatinine is low          Passed - No positive pregnancy test in past 12 months             Kurt Linda RN 02/16/22 10:06 AM

## 2022-03-26 ENCOUNTER — HEALTH MAINTENANCE LETTER (OUTPATIENT)
Age: 52
End: 2022-03-26

## 2022-05-21 ENCOUNTER — HEALTH MAINTENANCE LETTER (OUTPATIENT)
Age: 52
End: 2022-05-21

## 2022-09-18 ENCOUNTER — HEALTH MAINTENANCE LETTER (OUTPATIENT)
Age: 52
End: 2022-09-18

## 2022-12-07 ENCOUNTER — OFFICE VISIT (OUTPATIENT)
Dept: URGENT CARE | Facility: URGENT CARE | Age: 52
End: 2022-12-07
Payer: COMMERCIAL

## 2022-12-07 VITALS
OXYGEN SATURATION: 97 % | RESPIRATION RATE: 22 BRPM | TEMPERATURE: 99.7 F | SYSTOLIC BLOOD PRESSURE: 143 MMHG | DIASTOLIC BLOOD PRESSURE: 88 MMHG | HEART RATE: 94 BPM

## 2022-12-07 DIAGNOSIS — J03.90 TONSILLITIS: ICD-10-CM

## 2022-12-07 DIAGNOSIS — R50.9 FEVER IN ADULT: Primary | ICD-10-CM

## 2022-12-07 LAB
DEPRECATED S PYO AG THROAT QL EIA: NEGATIVE
FLUAV AG SPEC QL IA: NEGATIVE
FLUBV AG SPEC QL IA: NEGATIVE

## 2022-12-07 PROCEDURE — U0003 INFECTIOUS AGENT DETECTION BY NUCLEIC ACID (DNA OR RNA); SEVERE ACUTE RESPIRATORY SYNDROME CORONAVIRUS 2 (SARS-COV-2) (CORONAVIRUS DISEASE [COVID-19]), AMPLIFIED PROBE TECHNIQUE, MAKING USE OF HIGH THROUGHPUT TECHNOLOGIES AS DESCRIBED BY CMS-2020-01-R: HCPCS | Performed by: PHYSICIAN ASSISTANT

## 2022-12-07 PROCEDURE — 87804 INFLUENZA ASSAY W/OPTIC: CPT | Performed by: PHYSICIAN ASSISTANT

## 2022-12-07 PROCEDURE — 87651 STREP A DNA AMP PROBE: CPT | Performed by: PHYSICIAN ASSISTANT

## 2022-12-07 PROCEDURE — 99213 OFFICE O/P EST LOW 20 MIN: CPT | Performed by: PHYSICIAN ASSISTANT

## 2022-12-07 PROCEDURE — U0005 INFEC AGEN DETEC AMPLI PROBE: HCPCS | Performed by: PHYSICIAN ASSISTANT

## 2022-12-07 RX ORDER — CEFDINIR 300 MG/1
300 CAPSULE ORAL 2 TIMES DAILY
Qty: 20 CAPSULE | Refills: 0 | Status: SHIPPED | OUTPATIENT
Start: 2022-12-07 | End: 2022-12-17

## 2022-12-08 LAB
GROUP A STREP BY PCR: NOT DETECTED
SARS-COV-2 RNA RESP QL NAA+PROBE: NEGATIVE

## 2023-01-03 NOTE — PROGRESS NOTES
SUBJECTIVE:   Adela Stone is a 52 year old female presenting with a chief complaint of Present for body aches, sore throat, chills and headache since Monday morning.  Course of illness is same.    Severity moderate  Current and Associated symptoms: as above  Treatment measures tried include Tylenol/Ibuprofen, Fluids and Rest.  Predisposing factors include None.    Past Medical History:   Diagnosis Date     Anxiety      Current Outpatient Medications   Medication Sig Dispense Refill     CITALOPRAM HYDROBROMIDE PO  (Patient not taking: Reported on 12/7/2022)       desvenlafaxine (PRISTIQ) 50 MG 24 hr tablet TAKE 1 TABLET BY MOUTH EVERY DAY (Patient not taking: Reported on 12/7/2022) 15 tablet 0     LORazepam (ATIVAN) 0.5 MG tablet Take 0.5 mg by mouth (Patient not taking: Reported on 12/7/2022)       Social History     Tobacco Use     Smoking status: Never     Smokeless tobacco: Never   Substance Use Topics     Alcohol use: Yes     Alcohol/week: 2.5 standard drinks       ROS:  Review of systems negative except as stated above.    OBJECTIVE:  BP (!) 143/88 (BP Location: Left arm, Patient Position: Sitting, Cuff Size: Adult Regular)   Pulse 94   Temp 99.7  F (37.6  C) (Tympanic)   Resp 22   SpO2 97%   GENERAL APPEARANCE: healthy, alert and no distress  EYES: EOMI,  PERRL, conjunctiva clear  HENT: ear canals and TM's normal.  Nose and mouth without ulcers, erythema or lesions  NECK: supple, nontender, no lymphadenopathy  RESP: lungs clear to auscultation - no rales, rhonchi or wheezes  CV: regular rates and rhythm, normal S1 S2, no murmur noted  NEURO: Normal strength and tone, sensory exam grossly normal,  normal speech and mentation  SKIN: no suspicious lesions or rashes      Results for orders placed or performed in visit on 12/07/22   Symptomatic COVID-19 Virus (Coronavirus) by PCR Nose     Status: Normal    Specimen: Nose; Swab   Result Value Ref Range    SARS CoV2 PCR Negative Negative    Narrative    Testing  was performed using the Aptima SARS-CoV-2 Assay on the  [employer] Instrument System. Additional information about this  Emergency Use Authorization (EUA) assay can be found via the Lab  Guide. This test should be ordered for the detection of SARS-CoV-2 in  individuals who meet SARS-CoV-2 clinical and/or epidemiological  criteria. Test performance is unknown in asymptomatic patients. This  test is for in vitro diagnostic use under the FDA EUA for  laboratories certified under CLIA to perform high complexity testing.  This test has not been FDA cleared or approved. A negative result  does not rule out the presence of PCR inhibitors in the specimen or  target RNA in concentration below the limit of detection for the  assay. The possibility of a false negative should be considered if  the patient's recent exposure or clinical presentation suggests  COVID-19. This test was validated by the [facility]. This laboratory is certified under  the Clinical Laboratory Improvement Amendments of 1988 (CLIA-88) as  qualified to perform high complexity laboratory testing.   Influenza A & B Antigen     Status: Normal    Specimen: Nose; Swab   Result Value Ref Range    Influenza A antigen Negative Negative    Influenza B antigen Negative Negative    Narrative    Test results must be correlated with clinical data. If necessary, results should be confirmed by a molecular assay or viral culture.   Streptococcus A Rapid Screen w/Reflex to PCR     Status: Normal    Specimen: Throat; Swab   Result Value Ref Range    Group A Strep antigen Negative Negative   Group A Streptococcus PCR Throat Swab     Status: Normal    Specimen: Throat; Swab   Result Value Ref Range    Group A strep by PCR Not Detected Not Detected    Narrative    The Xpert Xpress Strep A test, performed on the [employer]  Instrument Systems, is a rapid, qualitative in vitro diagnostic test for the detection of Streptococcus  pyogenes (Group A ß-hemolytic Streptococcus, Strep A) in throat swab specimens from patients with signs and symptoms of pharyngitis. The Xpert Xpress Strep A test can be used as an aid in the diagnosis of Group A Streptococcal pharyngitis. The assay is not intended to monitor treatment for Group A Streptococcus infections. The Xpert Xpress Strep A test utilizes an automated real-time polymerase chain reaction (PCR) to detect Streptococcus pyogenes DNA.       ASSESSMENT:  (R50.9) Fever in adult  (primary encounter diagnosis)  Plan: Symptomatic COVID-19 Virus (Coronavirus) by PCR        Nose, Influenza A & B Antigen, Streptococcus A         Rapid Screen w/Reflex to PCR, Group A         Streptococcus PCR Throat Swab      (J03.90) Tonsillitis  Plan: cefdinir (OMNICEF) 300 MG capsule      Red flags and emergent follow up discussed, and understood by patient  Follow up with PCP if symptoms worsen or fail to improve

## 2023-01-24 ENCOUNTER — HOSPITAL ENCOUNTER (EMERGENCY)
Facility: CLINIC | Age: 53
Discharge: HOME | End: 2023-01-24
Attending: EMERGENCY MEDICINE | Admitting: EMERGENCY MEDICINE
Payer: COMMERCIAL

## 2023-01-24 ENCOUNTER — APPOINTMENT (OUTPATIENT)
Dept: MRI IMAGING | Facility: CLINIC | Age: 53
End: 2023-01-24
Attending: EMERGENCY MEDICINE
Payer: COMMERCIAL

## 2023-01-24 VITALS
HEART RATE: 78 BPM | DIASTOLIC BLOOD PRESSURE: 100 MMHG | WEIGHT: 160 LBS | OXYGEN SATURATION: 95 % | SYSTOLIC BLOOD PRESSURE: 138 MMHG | RESPIRATION RATE: 16 BRPM | BODY MASS INDEX: 29.44 KG/M2 | HEIGHT: 62 IN | TEMPERATURE: 97.6 F

## 2023-01-24 DIAGNOSIS — R42 DIZZINESS: ICD-10-CM

## 2023-01-24 LAB
ALBUMIN SERPL BCG-MCNC: 4.5 G/DL (ref 3.5–5.2)
ALP SERPL-CCNC: 76 U/L (ref 35–104)
ALT SERPL W P-5'-P-CCNC: 24 U/L (ref 10–35)
ANION GAP SERPL CALCULATED.3IONS-SCNC: 13 MMOL/L (ref 7–15)
AST SERPL W P-5'-P-CCNC: 23 U/L (ref 10–35)
ATRIAL RATE - MUSE: 76 BPM
BASOPHILS # BLD AUTO: 0.1 10E3/UL (ref 0–0.2)
BASOPHILS NFR BLD AUTO: 1 %
BILIRUB SERPL-MCNC: 0.5 MG/DL
BUN SERPL-MCNC: 9.3 MG/DL (ref 6–20)
CALCIUM SERPL-MCNC: 9.2 MG/DL (ref 8.6–10)
CHLORIDE SERPL-SCNC: 101 MMOL/L (ref 98–107)
CREAT SERPL-MCNC: 0.72 MG/DL (ref 0.51–0.95)
DEPRECATED HCO3 PLAS-SCNC: 25 MMOL/L (ref 22–29)
DIASTOLIC BLOOD PRESSURE - MUSE: NORMAL MMHG
EOSINOPHIL # BLD AUTO: 0.1 10E3/UL (ref 0–0.7)
EOSINOPHIL NFR BLD AUTO: 2 %
ERYTHROCYTE [DISTWIDTH] IN BLOOD BY AUTOMATED COUNT: 13.2 % (ref 10–15)
GFR SERPL CREATININE-BSD FRML MDRD: >90 ML/MIN/1.73M2
GLUCOSE SERPL-MCNC: 114 MG/DL (ref 70–99)
HCT VFR BLD AUTO: 41.2 % (ref 35–47)
HGB BLD-MCNC: 13.6 G/DL (ref 11.7–15.7)
IMM GRANULOCYTES # BLD: 0 10E3/UL
IMM GRANULOCYTES NFR BLD: 0 %
INTERPRETATION ECG - MUSE: NORMAL
LYMPHOCYTES # BLD AUTO: 1.5 10E3/UL (ref 0.8–5.3)
LYMPHOCYTES NFR BLD AUTO: 25 %
MCH RBC QN AUTO: 30.2 PG (ref 26.5–33)
MCHC RBC AUTO-ENTMCNC: 33 G/DL (ref 31.5–36.5)
MCV RBC AUTO: 91 FL (ref 78–100)
MONOCYTES # BLD AUTO: 0.4 10E3/UL (ref 0–1.3)
MONOCYTES NFR BLD AUTO: 6 %
NEUTROPHILS # BLD AUTO: 4 10E3/UL (ref 1.6–8.3)
NEUTROPHILS NFR BLD AUTO: 66 %
NRBC # BLD AUTO: 0 10E3/UL
NRBC BLD AUTO-RTO: 0 /100
P AXIS - MUSE: 54 DEGREES
PLATELET # BLD AUTO: 265 10E3/UL (ref 150–450)
POTASSIUM SERPL-SCNC: 3.8 MMOL/L (ref 3.4–5.3)
PR INTERVAL - MUSE: 122 MS
PROT SERPL-MCNC: 7.3 G/DL (ref 6.4–8.3)
QRS DURATION - MUSE: 70 MS
QT - MUSE: 400 MS
QTC - MUSE: 450 MS
R AXIS - MUSE: 94 DEGREES
RBC # BLD AUTO: 4.51 10E6/UL (ref 3.8–5.2)
SODIUM SERPL-SCNC: 139 MMOL/L (ref 136–145)
SYSTOLIC BLOOD PRESSURE - MUSE: NORMAL MMHG
T AXIS - MUSE: 54 DEGREES
TROPONIN T SERPL HS-MCNC: <6 NG/L
VENTRICULAR RATE- MUSE: 76 BPM
WBC # BLD AUTO: 6 10E3/UL (ref 4–11)

## 2023-01-24 PROCEDURE — 36415 COLL VENOUS BLD VENIPUNCTURE: CPT | Performed by: EMERGENCY MEDICINE

## 2023-01-24 PROCEDURE — 255N000002 HC RX 255 OP 636: Performed by: EMERGENCY MEDICINE

## 2023-01-24 PROCEDURE — 99285 EMERGENCY DEPT VISIT HI MDM: CPT | Mod: 25

## 2023-01-24 PROCEDURE — 80053 COMPREHEN METABOLIC PANEL: CPT | Performed by: EMERGENCY MEDICINE

## 2023-01-24 PROCEDURE — 85025 COMPLETE CBC W/AUTO DIFF WBC: CPT | Performed by: EMERGENCY MEDICINE

## 2023-01-24 PROCEDURE — 84484 ASSAY OF TROPONIN QUANT: CPT | Performed by: EMERGENCY MEDICINE

## 2023-01-24 PROCEDURE — 250N000013 HC RX MED GY IP 250 OP 250 PS 637: Performed by: EMERGENCY MEDICINE

## 2023-01-24 PROCEDURE — A9585 GADOBUTROL INJECTION: HCPCS | Performed by: EMERGENCY MEDICINE

## 2023-01-24 PROCEDURE — 93005 ELECTROCARDIOGRAM TRACING: CPT

## 2023-01-24 PROCEDURE — 70551 MRI BRAIN STEM W/O DYE: CPT | Mod: 52

## 2023-01-24 RX ORDER — GADOBUTROL 604.72 MG/ML
10 INJECTION INTRAVENOUS ONCE
Status: COMPLETED | OUTPATIENT
Start: 2023-01-24 | End: 2023-01-24

## 2023-01-24 RX ORDER — HYDROXYZINE HYDROCHLORIDE 25 MG/1
25 TABLET, FILM COATED ORAL 3 TIMES DAILY PRN
Qty: 12 TABLET | Refills: 0 | Status: SHIPPED | OUTPATIENT
Start: 2023-01-24 | End: 2024-03-14

## 2023-01-24 RX ORDER — DIAZEPAM 5 MG
5 TABLET ORAL ONCE
Status: COMPLETED | OUTPATIENT
Start: 2023-01-24 | End: 2023-01-24

## 2023-01-24 RX ADMIN — GADOBUTROL 10 ML: 604.72 INJECTION INTRAVENOUS at 12:44

## 2023-01-24 RX ADMIN — DIAZEPAM 5 MG: 5 TABLET ORAL at 12:38

## 2023-01-24 ASSESSMENT — ACTIVITIES OF DAILY LIVING (ADL)
ADLS_ACUITY_SCORE: 35

## 2023-01-24 ASSESSMENT — ENCOUNTER SYMPTOMS
NERVOUS/ANXIOUS: 1
HEADACHES: 1
DIZZINESS: 1
FEVER: 0

## 2023-01-24 NOTE — ED TRIAGE NOTES
Pt reports starting yesterday around 1100 that she was feeling nauseated, tightness in jaw, dizziness, Palpations, weakness in core, and overall anxiety feeling. Pt reports self weaning off anxiety meds in September. Pt reports shaking like she was cold. Pt reports dizziness at [place] of walk at mall over past two weeks.      Triage Assessment       Row Name 01/24/23 0907       Triage Assessment (Adult)    Airway WDL WDL       Cognitive/Neuro/Behavioral WDL    Cognitive/Neuro/Behavioral WDL --  Dizziness, anxiety feeling, core weakness

## 2023-01-24 NOTE — ED PROVIDER NOTES
History     Chief Complaint:  Dizziness       HPI   Adela Stone is a 52 year old female who presents with dizziness. Since yesterday afternoon she has become dizzy and she feels like her internal muscles are trembling. She also has a mild headcahe that feels like she is wearing a tight baseball cap. She feels off balance and also anxious. She denies any dehydration or urinary changes. She denies a fever or chest pain. She also is decreasing Pristiq for her anxiety, she is fully off of her lorazepam since September. She has not taken hydroxyzine in the past. She denies any falls or injury yesterday.    ROS:  Review of Systems   Constitutional: Negative for fever.   Cardiovascular: Negative for chest pain.   Genitourinary: Negative.    Neurological: Positive for dizziness and headaches.   Psychiatric/Behavioral: The patient is nervous/anxious.    All other systems reviewed and are negative.    Allergies:  No Known Allergies     Medications:    CITALOPRAM HYDROBROMIDE  desvenlafaxine   LORazepam     Past Medical History:    [place]  Rosacea    Past Surgical History:    Dental surgery     Family History:    family history includes Cancer in her paternal grandfather and paternal grandmother; Diabetes in her father; Diabetes Type 2  in her father; Hypertension in her brother, brother, father, maternal grandfather, maternal grandmother, and mother; Lung Cancer in her maternal grandmother.    Social History:   reports that she has never smoked. She has never used smokeless tobacco. She reports current alcohol use of about 2.5 standard drinks per week. She reports that she does not use drugs.  PCP: Luz Elena Alonzo     Physical Exam     Patient Vitals for the past 24 hrs:   BP Temp Pulse Resp SpO2 Height Weight   01/24/23 1552 (!) 144/99 -- 87 16 99 % -- --   01/24/23 1230 (!) 163/98 -- 91 -- -- -- --   01/24/23 1215 (!) 150/103 -- 91 -- -- -- --   01/24/23 0901 (!) 174/104 97.6  F (36.4  C) 116 14 99 % 1.575 m (5'  "2\") 72.6 kg (160 lb)        Physical Exam   General:  Sitting on bed by self.   HENT:  No obvious trauma to head  Right Ear:  External ear normal.   Left Ear:  External ear normal.   Nose:  Nose normal.   Eyes:  Conjunctivae and EOM are normal. Pupils are equal, round, and reactive.   Neck: Normal range of motion. Neck supple. No tracheal deviation present.   CV:  Normal heart sounds. No murmur heard.  Pulm/Chest: Effort normal and breath sounds normal.   Abd: Soft. No distension. There is no tenderness. There is no rigidity, no rebound and no guarding.   M/S: Normal range of motion.   Neuro: Alert. GCS 15. CN II-XII Grossly intact, no pronator drift, normal finger-nose-finger, visual fields intact by confrontation. Muscle strength is +5 proximal and distal in the bilateral upper and lower extremities. No dysarthria. Normal palm up, palm down.  Horizontal nystagmus present.  Skin: Skin is warm and dry. No rash noted. Not diaphoretic.   Psych: Normal mood and affect. Behavior is normal.     Emergency Department Course   ECG:  ECG results from 01/24/23 signed at 1227   EKG 12-lead, tracing only     Value    Systolic Blood Pressure     Diastolic Blood Pressure     Ventricular Rate 76    Atrial Rate 76    MS Interval 122    QRS Duration 70        QTc 450    P Axis 54    R AXIS 94    T Axis 54    Interpretation ECG      Sinus rhythm  Rightward axis  Nonspecific ST abnormality  Abnormal ECG  When compared with ECG of 09-NOV-2021 22:48,  Questionable change in QRS axis         Imaging:  MR Brain w/o & w Contrast    (Results Pending)   MRA Brain ([place]) wo Contrast    (Results Pending)   MRA Neck (Carot[place]) wo & w Contrast    (Results Pending)      Report per radiology    Laboratory:  Labs Ordered and Resulted from Time of ED Arrival to Time of ED Departure   COMPREHENSIVE METABOLIC PANEL - Abnormal       Result Value    Sodium 139      Potassium 3.8      Chloride 101      Carbon Dioxide (CO2) 25      Anion " Gap 13      Urea Nitrogen 9.3      Creatinine 0.72      Calcium 9.2      Glucose 114 (*)     Alkaline Phosphatase 76      AST 23      ALT 24      Protein Total 7.3      Albumin 4.5      Bilirubin Total 0.5      GFR Estimate >90     TROPONIN T, HIGH SENSITIVITY - Normal    Troponin T, High Sensitivity <6     CBC WITH PLATELETS AND DIFFERENTIAL    WBC Count 6.0      RBC Count 4.51      Hemoglobin 13.6      Hematocrit 41.2      MCV 91      MCH 30.2      MCHC 33.0      RDW 13.2      Platelet Count 265      % Neutrophils 66      % Lymphocytes 25      % Monocytes 6      % Eosinophils 2      % Basophils 1      % Immature Granulocytes 0      NRBCs per 100 WBC 0      Absolute Neutrophils 4.0      Absolute Lymphocytes 1.5      Absolute Monocytes 0.4      Absolute Eosinophils 0.1      Absolute Basophils 0.1      Absolute Immature Granulocytes 0.0      Absolute NRBCs 0.0          Emergency Department Course & Assessments:  Interventions:  Medications   diazepam (VALIUM) tablet 5 mg (5 mg Oral Given 1/24/23 1238)   sodium chloride (PF) 0.9% PF flush 100 mL (100 mLs Intravenous Given 1/24/23 1245)   gadobutrol (GADAVIST) injection 10 mL (10 mLs Intravenous Given 1/24/23 1244)        Independent Interpretation (X-rays, CTs, rhythm strip):  I reviewed EKG, MRI, and labs.    Consultations/Discussion of Management or Tests:   ED Course as of 01/24/23 1817 Tue Jan 24, 2023   1152 Obtained the patients history and performed initial exam         Social Determinants of Health affecting care:  None     Disposition:  The patient will board in the emergency department pending bed placement. Care was signed out to Dr. Yusuf.     Impression & Plan    Medical Decision Making:  Adela Stone is a very pleasant 52 year old year old patient who presents to the emergency department with concern of dizziness.  The patient has a history of anxiety and she thought it may be related to that.  She has had this intermittently over the past 2 weeks,  but yesterday while at [employer] she felt lightheaded and unsteady walking.  She did not have a fall or injury.  She has a very slight headache right now, but nothing significant.  She presents now because she continues to feel unwell.  Her neurologic exam is normal.  There is questionable horizontal nystagmus, but nothing significant.  Screening EKG shows a sinus rhythm.  Labs are unremarkable.  An MRI of the brain with and without contrast and MRA of the head and neck were obtained and results are pending.  The patient thought her symptoms may be related to anxiety as she discontinued the lorazepam in September.  She was provided Valium to assist with obtaining the MRI images.  She did feel better after that.  Her symptoms may be related to anxiety.  She does not like being on the lorazepam so I did suggest the possibility of trialing hydroxyzine.  After doing so the patient was in agreement to try this.  At this time, though, we are still awaiting the MRI and MRA report from the radiologist.  The images are not being pushed to PACS and the neuro radiologists are not in-house.  Care of the patient is signed out to Dr. Aleman until we obtain this report.  I expressed my deep sympathies to the patient for this delay and she is certainly understanding and willing to wait for the results.  I discussed with her if the MRI is completely normal that she will be able to be discharged with a trial of the hydroxyzine for anxiety and she could also try over-the-counter meclizine if she has dizziness that is not amenable to the hydroxyzine as vertigo is still within the differential.  She is in agreement to this.  Since she received the Valium earlier, her daughter is going to pick her up.  She is aware she cannot drive or operate heavy machinery while taking the hydroxyzine.  I also considered other diagnoses including meningitis, but she has no meningeal signs, fever or leukocytosis to suggest this I do not believe a lumbar  puncture is indicated at this time.  She mentioned feeling trembling inside of her, but she has no palpable mass or abdominal tenderness to suggest abdominal aortic aneurysm.  No chest pain or back pain to suggest thoracic aortic dissection or aneurysm.    Diagnosis:    ICD-10-CM    1. Dizziness  R42            Discharge Medications:  New Prescriptions    HYDROXYZINE (ATARAX) 25 MG TABLET    Take 1 tablet (25 mg) by mouth 3 times daily as needed for anxiety        Scribe Disclosure:  Davis NANCE, am serving as a scribe at 11:49 AM on 1/24/2023 to document services personally performed by Bipin Khan DO based on my observations and the provider's statements to me.     1/24/2023   Bipin Khan DO Anderson, Robert James, DO  01/24/23 1819

## 2023-01-25 ENCOUNTER — TELEPHONE (OUTPATIENT)
Dept: FAMILY MEDICINE | Facility: CLINIC | Age: 53
End: 2023-01-25
Payer: COMMERCIAL

## 2023-01-25 NOTE — TELEPHONE ENCOUNTER
Reason for Call:  Other appointment    Detailed comments: patient called and would like an appointment with Clinton Parham at [facility] today; or tomorrow.    Reason:  E/R f/u from [facility] for dizziness.    Please contact patient today.  Thank you.    Phone Number Patient can be reached at: Home number on file [phone] (home)    Best Time: any    Can we leave a detailed message on this number? YES    Call taken on 1/25/2023 at 8:11 AM by Naomie King

## 2023-01-25 NOTE — DISCHARGE INSTRUCTIONS
Try hydroxyzine.  If you are not improving you can try YouTube maneuvers for BPPV and contact your primary care regarding physical therapy referral for your ears.  Return immediately if you have worsening symptoms new concerns of any kind.      Discharge Instructions  Dizziness (Lightheaded)  Today you were seen for dizziness.  Dizziness can be caused by many things and it can be very difficult to determine the cause of dizziness.  At this time, your provider has found no signs that your dizziness is due to a serious or life-threatening condition. However, sometimes there is a serious problem that does not show up right away, and it is important for you to follow up with your regular provider as instructed.  Generally, every Emergency Department visit should have a follow-up clinic visit with either a primary or a specialty clinic/provider. Please follow-up as instructed by your emergency provider today.      Return to the Emergency Department if:    You pass out (fainting or falling out), especially during exercise.    You develop chest pain, chest pressure or difficulty breathing.  Your feel an irregular heartbeat.  You have excessive vaginal bleeding, or blood in your stool or vomit (throw up).  You have a high fever.  Your symptoms get worse or more frequent.    If when you begin to feel dizzy or lightheaded, it is important to sit down or lay down immediately to prevent injury from falling.  If you were given a prescription for medicine here today, be sure to read all of the information (including the package insert) that comes with your prescription.  This will include important information about the medicine, its side effects, and any warnings that you need to know about.  The pharmacist who fills the prescription can provide more information and answer questions you may have about the medicine.  If you have questions or concerns that the pharmacist cannot address, please call or return to the Emergency  Department.   Remember that you can always come back to the Emergency Department if you are not able to see your regular provider in the amount of time listed above, if you get any new symptoms, or if there is anything that worries you.

## 2023-01-25 NOTE — ED PROVIDER NOTES
"This 52 year old female patient with anxiety who has stopped Ativan since Septemeber was endorsed to me by Dr. Khan pending MRI/MRA read for 2 weeks of intermittent dizziness. There is a technical issue where the image has been completed but cannot be sent to PACS for a read. She has been given Valium.    I have reviewed patient's vitals, labs, and notes which are remarkable for improving hypertension, resolved tachycardia.    1907: I spoke with the MRI technician who tells me there is \"still no ETA\" on when this technical issue would be resolved.  The images will be available for viewing in the MRI room.    1940: I spoke with MRI again.  They have spoken with the radiologist who has recommended a repeat non-contrast enhanced scan to rule out stroke.  Repeat MRI/MRA with contrast cannot be done for another 5 to 6 hours.    1945: I updated the patient on the technical issues and apologized.  She understands she will be going for diffusion scan and she will consider if she wants to wait for repeat MRI/MRA or not.    2157: I updated Yana on MRI results.  She is comfortable with this ruling out stroke for discharge. I believe MRI with contrast and MRA head and neck to be highly unlikely to  and can be deferred at this time.  We discussed treatment with hydroxyzine as per Dr. Khan.  We discussed if it is BPPV and not improving she could try maneuvers she finds on YouTube or contact primary care for a physical therapy referral.  We did discuss indications for return and all of her questions were answered.  She verbalized understanding and is amenable to discharge.  I did again apologize and thanked her for being so patient today. She will return as needed.    MR Brain w/o Contrast   Final Result   IMPRESSION:   1.  No acute or subacute ischemic change.   2.  Probable mild sequela of chronic small vessel ischemic disease.        Nichol Yusuf MD  01/25/23 3299    "

## 2023-01-26 ENCOUNTER — OFFICE VISIT (OUTPATIENT)
Dept: FAMILY MEDICINE | Facility: CLINIC | Age: 53
End: 2023-01-26
Payer: COMMERCIAL

## 2023-01-26 VITALS
SYSTOLIC BLOOD PRESSURE: 138 MMHG | HEART RATE: 96 BPM | BODY MASS INDEX: 29.72 KG/M2 | HEIGHT: 62 IN | DIASTOLIC BLOOD PRESSURE: 90 MMHG | OXYGEN SATURATION: 97 % | WEIGHT: 161.5 LBS | RESPIRATION RATE: 16 BRPM

## 2023-01-26 DIAGNOSIS — F41.1 GENERALIZED ANXIETY DISORDER: Primary | ICD-10-CM

## 2023-01-26 DIAGNOSIS — Z12.31 VISIT FOR SCREENING MAMMOGRAM: ICD-10-CM

## 2023-01-26 PROCEDURE — 99214 OFFICE O/P EST MOD 30 MIN: CPT | Performed by: FAMILY MEDICINE

## 2023-01-26 PROCEDURE — 96127 BRIEF EMOTIONAL/BEHAV ASSMT: CPT | Performed by: FAMILY MEDICINE

## 2023-01-26 RX ORDER — LORAZEPAM 0.5 MG/1
TABLET ORAL
Qty: 30 TABLET | Refills: 0 | Status: SHIPPED | OUTPATIENT
Start: 2023-01-26 | End: 2023-06-19

## 2023-01-26 RX ORDER — DESVENLAFAXINE 50 MG/1
50 TABLET, FILM COATED, EXTENDED RELEASE ORAL DAILY
Qty: 90 TABLET | Refills: 3 | Status: SHIPPED | OUTPATIENT
Start: 2023-01-26 | End: 2024-02-13

## 2023-01-26 ASSESSMENT — PATIENT HEALTH QUESTIONNAIRE - PHQ9
SUM OF ALL RESPONSES TO PHQ QUESTIONS 1-9: 13
10. IF YOU CHECKED OFF ANY PROBLEMS, HOW DIFFICULT HAVE THESE PROBLEMS MADE IT FOR YOU TO DO YOUR WORK, TAKE CARE OF THINGS AT HOME, OR GET ALONG WITH OTHER PEOPLE: VERY DIFFICULT
SUM OF ALL RESPONSES TO PHQ QUESTIONS 1-9: 13

## 2023-01-26 NOTE — PROGRESS NOTES
"      Problem List Items Addressed This Visit     Generalized anxiety disorder - Primary     Will restart Pristiq.  She can use lorazepam sparingly for severe anxiety/ panic.   #30 tabs to last 3 months.    Controlled substance contract signed today.  F/U in 6 months.         Relevant Medications    desvenlafaxine (PRISTIQ) 50 MG 24 hr tablet    LORazepam (ATIVAN) 0.5 MG tablet    Other Relevant Orders    REVIEW OF HEALTH MAINTENANCE PROTOCOL ORDERS (Completed)    Adult Mental Health  Referral   Other Visit Diagnoses     Visit for screening mammogram        Relevant Orders    MA SCREENING DIGITAL BILAT - Future  (s+30)             Emely Millan is a 52 year old who presents for the following health issues   Chief Complaint   Patient presents with     ER F/U     ER follow up from [facility] Emergency Dept on 1/24/23 for dizziness      She was seen in ED for feeling dizzy ( funny in head), strange feeling in her body.  She felt trembling inside and has a pressure like headache.  She also felt off balance and anxious. No recent illness. She stopped her Pristiq for anxiety in the fall and ran out of her lorazepam. Has been more anxious lately.  Single mother with 3 teenagers including one with mental health issue.  She was evaluated with normal blood work.  Had MRI of the head non contrast but could not get rest of exam done- MRI with contrast/ MRA. She was discharged as felt low likelihood of yield with the other tests.  Since coming home symptoms are better.      HPI     Review of Systems       Patient Active Problem List   Diagnosis     Vitamin D Deficiency     Rosacea     Palpitations     Generalized anxiety disorder          Objective    BP (!) 138/90 (BP Location: Left arm, Patient Position: Sitting, Cuff Size: Adult Regular)   Pulse 96   Resp 16   Ht 1.575 m (5' 2\")   Wt 73.3 kg (161 lb 8 oz)   LMP  (LMP Unknown)   SpO2 97%   BMI 29.54 kg/m    Body mass index is 29.54 " kg/m .  Physical Exam   GENERAL: healthy, alert and no distress  PSYCH: mentation appears normal, anxious, judgement and insight intact and appearance well groomed    MRI of the brain non-contrast - normal        Answers for HPI/ROS submitted by the patient on 1/26/2023  If you checked off any problems, how difficult have these problems made it for you to do your work, take care of things at home, or get along with other people?: Very difficult  PHQ9 TOTAL SCORE: 13    Luz Elena Alonzo MD

## 2023-01-26 NOTE — ASSESSMENT & PLAN NOTE
>>ASSESSMENT AND PLAN FOR GENERALIZED ANXIETY DISORDER WRITTEN ON 1/26/2023  4:25 PM BY LOYD CASIANO MD    Will restart Pristiq.  She can use lorazepam sparingly for severe anxiety/ panic.   #30 tabs to last 3 months.    Controlled substance contract signed today.  F/U in 6 months.

## 2023-01-26 NOTE — ASSESSMENT & PLAN NOTE
Will restart Pristiq.  She can use lorazepam sparingly for severe anxiety/ panic.   #30 tabs to last 3 months.    Controlled substance contract signed today.  F/U in 6 months.

## 2023-01-26 NOTE — LETTER
[facility]  01/26/23  Patient: Adela Stone  YOB: 1970  Medical Record Number: [id number]                                                                                  Non-Opioid Controlled Substance Agreement    This is an agreement between you and your provider regarding safe and appropriate use of controlled substances prescribed by your care team. Controlled substances are?medicines that can cause physical and mental dependence (abuse).     There are strict laws about having and using these medicines. We here at [facility] are  committed to working with you in your efforts to get better. To support you in this work, we'll help you schedule regular office appointments for medicine refills. If we must cancel or change your appointment for any reason, we'll make sure you have enough medicine to last until your next appointment.     As a Provider, I will:     Listen carefully to your concerns while treating you with respect.     Recommend a treatment plan that I believe is in your best interest and may involve therapies other than medicine.      Talk with you often about the possible benefits and the risk of harm of any medicine that we prescribe for you.    Assess the safety of this medicine and check how well it works.      Provide a plan on how to taper (discontinue or go off) using this medicine if the decision is made to stop its use.      ::  As a Patient, I understand controlled substances:       Are prescribed by my care provider to help me function or work and manage my condition(s).?    Are strong medicines and can cause serious side effects.       Need to be taken exactly as prescribed.?Combining controlled substances with certain medicines or chemicals (such as illegal drugs, alcohol, sedatives, sleeping pills, and benzodiazepines) can be dangerous or even fatal.? If I stop taking my medicines suddenly, I may have severe withdrawal symptoms.     The risks,  benefits, and side effects of these medicine(s) were explained to me. I agree that:    1. I will take part in other treatments as advised by my care team. This may be psychiatry or counseling, physical therapy, behavioral therapy, group treatment or a referral to specialist.    2. I will keep all my appointments and understand this is part of the monitoring of controlled substances.?My care team may require an office visit for EVERY controlled substance refill. If I miss appointments or don t follow instructions, my care team may stop my medicine    3. I will take my medicines as prescribed. I will not change the dose or schedule unless my care team tells me to. There will be no refills if I run out early.      4. I may be asked to come to the clinic and complete a urine drug test or complete a pill count. If I don t give a urine sample or participate in a pill count, the care team may stop my medicine.    5. I will only receive controlled substance prescriptions from this clinic. If I am treated by another provider, I will tell them that I am taking controlled substances and that I have a treatment agreement with this provider. I will inform my [facility] care team within one business day if I am given a prescription for any controlled substance by another healthcare provider. My [facility] care team can contact other providers and pharmacists about my use of any medicines.    6. It is up to me to make sure that I don't run out of my medicines on weekends or holidays.?If my care team is willing to refill my prescription without a visit, I must request refills only during office hours. Refills may take up to 3 business days to process. I will use one pharmacy to fill all my controlled substance prescriptions. I will notify the clinic about any changes to my insurance or medicine availability.    7. I am responsible for my prescriptions. If the medicine/prescription is lost, stolen or destroyed, it will  not be replaced.?I also agree not to share controlled substance medicines with anyone.     8. I am aware I should not use any illegal or recreational drugs. I agree not to drink alcohol unless my care team says I can.     9. If I enroll in the [place] Medical Cannabis program, I will tell my care team before my next refill.    10. I will tell my care team right away if I become pregnant, have a new medical problem treated outside of my regular clinic, or have a change in my medicines.     11. I understand that this medicine can affect my thinking, judgment and reaction time.? Alcohol and drugs affect the brain and body, which can affect the safety of my driving. Being under the influence of alcohol or drugs can affect my decision-making, behaviors, personal safety and the safety of others. Driving while impaired (DWI) can occur if a person is driving, operating or in physical control of a car, motorcycle, boat, snowmobile, ATV, motorbike, off-road vehicle or any other motor vehicle ([place] Statute 169A.20). I understand the risk if I choose to drive or operate any vehicle or machinery.    I understand that if I do not follow any of the conditions above, my prescriptions or treatment may be stopped or changed.   I agree that my provider, clinic care team and pharmacy may work with any city, state or federal law enforcement agency that investigates the misuse, sale or other diversion of my controlled medicine. I will allow my provider to discuss my care with, or share a copy of, this agreement with any other treating provider, pharmacy or emergency room where I receive care.     I have read this agreement and have asked questions about anything I did not understand.    ________________________________________________________  Patient Signature - Adela Stone     ___________________                   Date     ________________________________________________________  Provider Signature - Luz Elena Alonzo MD        ___________________                   Date     ________________________________________________________  Witness Signature (required if provider not present while patient signing)          ___________________                   Date

## 2023-01-27 ENCOUNTER — TELEPHONE (OUTPATIENT)
Dept: FAMILY MEDICINE | Facility: CLINIC | Age: 53
End: 2023-01-27
Payer: COMMERCIAL

## 2023-01-27 NOTE — TELEPHONE ENCOUNTER
Prior Authorization Request  Who s requesting:  Pharmacy  Pharmacy Name and Location: [facility] [id number] [place] [facility], [address]  Medication Name: desvenlafaxine (PRISTIQ) 50 MG 24 hr tablet  Insurance Plan: [employer]  Insurance Member ID Number:  [id number]  CoverMyMeds Key: NA  Informed patient that prior authorizations can take up to 10 business days for response:   NA  Okay to leave a detailed message: No     Route to pool:  [facility]

## 2023-01-31 NOTE — TELEPHONE ENCOUNTER
Central Prior Authorization Team   Phone: [phone]      PA Initiation    Medication: Desvenlafaxine  Insurance Company: [employer] - Phone [phone] Fax [phone]  Pharmacy Filling the Rx: [facility] [id number] [place] [facility] - [place], [place] - [address]  Filling Pharmacy Phone: [phone]  Filling Pharmacy Fax:    Start Date: 1/31/2023

## 2023-01-31 NOTE — TELEPHONE ENCOUNTER
Prior Authorization Approval    Authorization Effective Date: 1/1/2023  Authorization Expiration Date: 1/31/2024  Medication: Desvenlafaxine-APPROVED  Approved Dose/Quantity:   Reference #:     Insurance Company: [employer] - Phone [phone] Fax [phone]  Expected CoPay:       CoPay Card Available:      Foundation Assistance Needed:    Which Pharmacy is filling the prescription (Not needed for infusion/clinic administered): [facility] [id number] [place], [place] - [address] [place] [address] [address]  Pharmacy Notified: Yes  Patient Notified: No.  **Instructed pharmacy to notify patient when script is ready to /ship.**

## 2023-03-03 ENCOUNTER — HOSPITAL ENCOUNTER (OUTPATIENT)
Dept: MAMMOGRAPHY | Facility: CLINIC | Age: 53
Discharge: HOME | End: 2023-03-03
Attending: FAMILY MEDICINE | Admitting: FAMILY MEDICINE
Payer: COMMERCIAL

## 2023-03-03 DIAGNOSIS — Z12.31 VISIT FOR SCREENING MAMMOGRAM: ICD-10-CM

## 2023-03-03 PROCEDURE — 77067 SCR MAMMO BI INCL CAD: CPT

## 2023-05-06 ENCOUNTER — HEALTH MAINTENANCE LETTER (OUTPATIENT)
Age: 53
End: 2023-05-06

## 2023-06-19 DIAGNOSIS — F41.1 GENERALIZED ANXIETY DISORDER: ICD-10-CM

## 2023-06-19 RX ORDER — LORAZEPAM 0.5 MG/1
TABLET ORAL
Qty: 30 TABLET | Refills: 0 | Status: SHIPPED | OUTPATIENT
Start: 2023-06-19 | End: 2024-03-18

## 2023-09-12 ENCOUNTER — LAB (OUTPATIENT)
Dept: FAMILY MEDICINE | Facility: CLINIC | Age: 53
End: 2023-09-12

## 2023-09-12 ENCOUNTER — OFFICE VISIT (OUTPATIENT)
Dept: FAMILY MEDICINE | Facility: CLINIC | Age: 53
End: 2023-09-12
Payer: COMMERCIAL

## 2023-09-12 VITALS
RESPIRATION RATE: 16 BRPM | OXYGEN SATURATION: 98 % | BODY MASS INDEX: 31.56 KG/M2 | TEMPERATURE: 98.1 F | DIASTOLIC BLOOD PRESSURE: 90 MMHG | HEIGHT: 62 IN | HEART RATE: 84 BPM | SYSTOLIC BLOOD PRESSURE: 137 MMHG | WEIGHT: 171.5 LBS

## 2023-09-12 DIAGNOSIS — F41.1 GENERALIZED ANXIETY DISORDER: ICD-10-CM

## 2023-09-12 DIAGNOSIS — Z23 NEED FOR VACCINATION: ICD-10-CM

## 2023-09-12 DIAGNOSIS — E55.9 VITAMIN D DEFICIENCY: ICD-10-CM

## 2023-09-12 DIAGNOSIS — Z12.11 SCREEN FOR COLON CANCER: ICD-10-CM

## 2023-09-12 DIAGNOSIS — Z13.228 ENCOUNTER FOR SCREENING FOR OTHER METABOLIC DISORDERS: ICD-10-CM

## 2023-09-12 DIAGNOSIS — Z00.00 HEALTH CARE MAINTENANCE: Primary | ICD-10-CM

## 2023-09-12 DIAGNOSIS — Z12.4 SCREENING FOR CERVICAL CANCER: ICD-10-CM

## 2023-09-12 DIAGNOSIS — I10 PRIMARY HYPERTENSION: ICD-10-CM

## 2023-09-12 LAB
ALBUMIN SERPL BCG-MCNC: 4.3 G/DL (ref 3.5–5.2)
ALP SERPL-CCNC: 79 U/L (ref 35–104)
ALT SERPL W P-5'-P-CCNC: 38 U/L (ref 0–50)
ANION GAP SERPL CALCULATED.3IONS-SCNC: 8 MMOL/L (ref 7–15)
AST SERPL W P-5'-P-CCNC: 27 U/L (ref 0–45)
BILIRUB SERPL-MCNC: 0.4 MG/DL
BUN SERPL-MCNC: 13.2 MG/DL (ref 6–20)
CALCIUM SERPL-MCNC: 9.6 MG/DL (ref 8.6–10)
CHLORIDE SERPL-SCNC: 105 MMOL/L (ref 98–107)
CHOLEST SERPL-MCNC: 201 MG/DL
CREAT SERPL-MCNC: 0.8 MG/DL (ref 0.51–0.95)
DEPRECATED CALCIDIOL+CALCIFEROL SERPL-MC: 24 UG/L (ref 20–75)
DEPRECATED HCO3 PLAS-SCNC: 28 MMOL/L (ref 22–29)
EGFRCR SERPLBLD CKD-EPI 2021: 87 ML/MIN/1.73M2
ERYTHROCYTE [DISTWIDTH] IN BLOOD BY AUTOMATED COUNT: 12.8 % (ref 10–15)
GLUCOSE SERPL-MCNC: 101 MG/DL (ref 70–99)
HCT VFR BLD AUTO: 39.8 % (ref 35–47)
HDLC SERPL-MCNC: 57 MG/DL
HGB BLD-MCNC: 13.3 G/DL (ref 11.7–15.7)
LDLC SERPL CALC-MCNC: 128 MG/DL
MCH RBC QN AUTO: 30.1 PG (ref 26.5–33)
MCHC RBC AUTO-ENTMCNC: 33.4 G/DL (ref 31.5–36.5)
MCV RBC AUTO: 90 FL (ref 78–100)
NONHDLC SERPL-MCNC: 144 MG/DL
PLATELET # BLD AUTO: 264 10E3/UL (ref 150–450)
POTASSIUM SERPL-SCNC: 4.5 MMOL/L (ref 3.4–5.3)
PROT SERPL-MCNC: 7.2 G/DL (ref 6.4–8.3)
RBC # BLD AUTO: 4.42 10E6/UL (ref 3.8–5.2)
SODIUM SERPL-SCNC: 141 MMOL/L (ref 136–145)
TRIGL SERPL-MCNC: 82 MG/DL
WBC # BLD AUTO: 5.5 10E3/UL (ref 4–11)

## 2023-09-12 PROCEDURE — 80053 COMPREHEN METABOLIC PANEL: CPT | Performed by: FAMILY MEDICINE

## 2023-09-12 PROCEDURE — 90750 HZV VACC RECOMBINANT IM: CPT | Performed by: FAMILY MEDICINE

## 2023-09-12 PROCEDURE — 90471 IMMUNIZATION ADMIN: CPT | Performed by: FAMILY MEDICINE

## 2023-09-12 PROCEDURE — 90682 RIV4 VACC RECOMBINANT DNA IM: CPT | Performed by: FAMILY MEDICINE

## 2023-09-12 PROCEDURE — 87624 HPV HI-RISK TYP POOLED RSLT: CPT | Performed by: FAMILY MEDICINE

## 2023-09-12 PROCEDURE — 85027 COMPLETE CBC AUTOMATED: CPT | Performed by: FAMILY MEDICINE

## 2023-09-12 PROCEDURE — 82306 VITAMIN D 25 HYDROXY: CPT | Performed by: FAMILY MEDICINE

## 2023-09-12 PROCEDURE — 90472 IMMUNIZATION ADMIN EACH ADD: CPT | Performed by: FAMILY MEDICINE

## 2023-09-12 PROCEDURE — G0145 SCR C/V CYTO,THINLAYER,RESCR: HCPCS | Performed by: FAMILY MEDICINE

## 2023-09-12 PROCEDURE — 36415 COLL VENOUS BLD VENIPUNCTURE: CPT | Performed by: FAMILY MEDICINE

## 2023-09-12 PROCEDURE — 99396 PREV VISIT EST AGE 40-64: CPT | Mod: 25 | Performed by: FAMILY MEDICINE

## 2023-09-12 PROCEDURE — 99213 OFFICE O/P EST LOW 20 MIN: CPT | Mod: 25 | Performed by: FAMILY MEDICINE

## 2023-09-12 PROCEDURE — 80061 LIPID PANEL: CPT | Performed by: FAMILY MEDICINE

## 2023-09-12 RX ORDER — LISINOPRIL 10 MG/1
10 TABLET ORAL DAILY
Qty: 30 TABLET | Refills: 1 | Status: SHIPPED | OUTPATIENT
Start: 2023-09-12 | End: 2024-05-10

## 2023-09-12 ASSESSMENT — ENCOUNTER SYMPTOMS
SORE THROAT: 0
BREAST MASS: 0
ARTHRALGIAS: 0
WEAKNESS: 0
CHILLS: 0
DIARRHEA: 0
HEADACHES: 1
PARESTHESIAS: 0
HEMATURIA: 0
ABDOMINAL PAIN: 0
SHORTNESS OF BREATH: 0
NAUSEA: 0
COUGH: 0
HEARTBURN: 0
MYALGIAS: 0
DIZZINESS: 1
NERVOUS/ANXIOUS: 1
EYE PAIN: 0
FEVER: 0
JOINT SWELLING: 0
PALPITATIONS: 1
DYSURIA: 0
CONSTIPATION: 1
FREQUENCY: 0
HEMATOCHEZIA: 0

## 2023-09-12 NOTE — PATIENT INSTRUCTIONS
Magnesium glycinate 400 mg at bedtime  L-theanine 200 mg twice daily  Daily multivitamin / fish oil  I will make recommendations for vitamin D  Referral to psychiatry    Start lisinopril 10 mg daily for blood pressure control  Follow-up with new provider in a few weeks to recheck blood pressure / labs.

## 2023-09-12 NOTE — PROGRESS NOTES
FEMALE ADULT PREVENTIVE EXAM:    Problem List Items Addressed This Visit       Vitamin D Deficiency    Relevant Orders    Vitamin D Deficiency    Generalized anxiety disorder    Relevant Orders    Adult Mental Health  Referral    Health care maintenance - Primary     Up to date with mammogram.  Check pap.  Agrees to do Cologuard.         Primary hypertension    Relevant Medications    lisinopril (ZESTRIL) 10 MG tablet     Other Visit Diagnoses       Need for vaccination        Relevant Orders    ZOSTER RECOMBINANT ADJUVANTED (SHINGRIX) (Completed)    Screening for cervical cancer        Relevant Orders    Gynecologic Cytology (PAP Smear)    Encounter for screening for other metabolic disorders        Relevant Orders    Comprehensive metabolic panel    Lipid panel reflex to direct LDL Fasting    CBC with platelets (Completed)    Screen for colon cancer        Relevant Orders    COLOGUARD(EXACT SCIENCES)           Patient Instructions   Magnesium glycinate 400 mg at bedtime  L-theanine 200 mg twice daily  Daily multivitamin / fish oil  I will make recommendations for vitamin D  Referral to psychiatry    Start lisinopril 10 mg daily for blood pressure control  Follow-up with new provider in a few weeks to recheck blood pressure / labs.     CHIEF COMPLAINT:  Female preventive exam.    SUBJECTIVE:  Adela Stone is a 53 year old female who presents for her routine physical exam.    Patient would like to address the following concerns today:   1) BP running high in the last few years.  Does not check her BP at home.  2) She continues to have anxiety.  Not sure the Pristiq is working well.  Has done Certify testing for meds. Has tried to do some counseling but does not feel it is helpful.  Tries to use lorazepam rarely - tries only to take a couple times a month.  3) Mother had hysterectomy for uterine cancer. She also has heart failure and B cell lymphoma.         GYNE HISTORY  Menses: post menopausal  Last Pap:  2021 normal  Abnormal Pap: no      She  has a past medical history of Anxiety.    Lab Results   Component Value Date    WBC 5.5 09/12/2023    HGB 13.3 09/12/2023    HCT 39.8 09/12/2023    MCV 90 09/12/2023     09/12/2023     01/24/2023    BUN 9.3 01/24/2023    CR 0.72 01/24/2023     Lab Results   Component Value Date    CHOL 195 01/28/2021    HDL 56 01/28/2021    TRIG 92 01/28/2021     Lab Results   Component Value Date    TSH 1.39 10/22/2019     BP Readings from Last 3 Encounters:   09/12/23 (!) 137/90   01/26/23 (!) 138/90   01/24/23 (!) 138/100       Surgeries:    Past Surgical History:   Procedure Laterality Date    [place] DENTAL SURGERY PROCEDURE      Description: Oral Surgery Tooth Extraction;  Recorded: 08/07/2013;       Family History:  Her family history includes Cancer in her paternal grandfather and paternal grandmother; Diabetes in her father; Diabetes Type 2  in her father; Hypertension in her brother, brother, father, maternal grandfather, maternal grandmother, and mother; Lung Cancer in her maternal grandmother.    Social History:  She  reports that she has never smoked. She has never been exposed to tobacco smoke. She has never used smokeless tobacco. She reports current alcohol use of about 2.5 standard drinks of alcohol per week. She reports that she does not use drugs. Lives with 3 children.  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    Medications:    Current Outpatient Medications:     desvenlafaxine (PRISTIQ) 50 MG 24 hr tablet, Take 1 tablet (50 mg) by mouth daily, Disp: 90 tablet, Rfl: 3    hydrOXYzine (ATARAX) 25 MG tablet, Take 1 tablet (25 mg) by mouth 3 times daily as needed for anxiety, Disp: 12 tablet, Rfl: 0    lisinopril (ZESTRIL) 10 MG tablet, Take 1 tablet (10 mg) by mouth daily, Disp: 30 tablet, Rfl: 1    LORazepam (ATIVAN) 0.5 MG tablet, TAKE ONE DAILY AS NEEDED FOR SEVERE ANXIETY, Disp: 30 tablet, Rfl: 0  HELD MEDICATIONS: None.      Allergies:  No latex allergies.  No  "Known Allergies         RISK BEHAVIOR & HEALTH HABITS  Answers submitted by the patient for this visit:  Annual Preventive Visit (Submitted on 9/12/2023)  Chief Complaint: Annual Exam:  Frequency of exercise:: 1 day/week  Getting at least 3 servings of Calcium per day:: Yes  Diet:: Regular (no restrictions)  Taking medications regularly:: Yes  Medication side effects:: Other  Bi-annual eye exam:: Yes  Dental care twice a year:: Yes  Sleep apnea or symptoms of sleep apnea:: Daytime drowsiness, Excessive snoring  abdominal pain: No  Blood in stool: No  Blood in urine: No  chest pain: No  chills: No  congestion: No  constipation: Yes  cough: No  diarrhea: No  dizziness: Yes  ear pain: No  eye pain: No  nervous/anxious: Yes  fever: No  frequency: No  genital sores: No  headaches: Yes  hearing loss: Yes  heartburn: No  arthralgias: No  joint swelling: No  peripheral edema: No  myalgias: No  nausea: No  dysuria: No  palpitations: Yes  Skin sensation changes: No  sore throat: No  urgency: No  rash: Yes  shortness of breath: No  visual disturbance: No  weakness: No  pelvic pain: No  vaginal bleeding: No  vaginal discharge: No  tenderness: No  breast mass: No  breast discharge: No  Additional concerns today:: Yes  Exercise outside of work (Submitted on 9/12/2023)  Chief Complaint: Annual Exam:  Duration of exercise:: 15-30 minutes      REVIEW OF SYSTEMS:  Complete head to toe review of systems is otherwise negative except as above.    OBJECTIVE:  VITAL SIGNS:  BP (!) 137/90 (BP Location: Left arm, Patient Position: Sitting, Cuff Size: Adult Regular)   Pulse 84   Temp 98.1  F (36.7  C) (Oral)   Resp 16   Ht 1.575 m (5' 2\")   Wt 77.8 kg (171 lb 8 oz)   LMP  (LMP Unknown)   SpO2 98%   BMI 31.37 kg/m    GENERAL:  Patient alert, in no acute distress.  EYES: PERRLA. Extraoccular movements intact, pupils equal, reactive to light and accommodation.  Normal conjunctiva and lids.   ENT:  Hearing grossly normal.  Normal " appearance to ears and nose.  Bilateral TM s, external canals, oropharynx normal. Normal lips, gums and teeth.   NECK:  Supple, without thyromegaly or mass.  RESP:  Clear to auscultation without crackles, wheezes or distress.  Normal respiratory effort.   CV:  Regular rate and rhythm without murmurs, rubs or gallops.  Normal pedal pulses.  No varicosities or edema.  ABDOMEN:  Soft, non-tender, without hepatosplenomegaly, masses, or hernias.   BREASTS:  Nontender, without masses, nipple discharge, erythema, or axillary adenopathy.    LYMPHATIC: No cervical or axillary lymphadenopathy.  No bruising.  NEURO:  CN II-XII intact, motor & sensory function all intact.  DTR and reflexes normal.  PSYCHIATRIC:  Alert & oriented with normal mood and affect.  Good judgment and insight.  SKIN:  Normal inspection and palpation.  MUSCULOSKELETAL: Normal gait and station.  - Spine / Ribs / Pelvis: Normal inspection, ROM, stability and strength: Spine, Head, Neck, Upper and Lower Extremities.    BP Readings from Last 6 Encounters:   09/12/23 (!) 137/90   01/26/23 (!) 138/90   01/24/23 (!) 138/100   12/07/22 (!) 143/88   11/10/21 (!) 153/104   01/28/21 130/84         Luz Elena Alonzo MD

## 2023-09-18 LAB
BKR LAB AP GYN ADEQUACY: NORMAL
BKR LAB AP GYN INTERPRETATION: NORMAL
BKR LAB AP HPV REFLEX: NORMAL
BKR LAB AP PREVIOUS ABNORMAL: NORMAL
PATH REPORT.COMMENTS IMP SPEC: NORMAL
PATH REPORT.COMMENTS IMP SPEC: NORMAL
PATH REPORT.RELEVANT HX SPEC: NORMAL

## 2023-09-20 LAB
HUMAN PAPILLOMA VIRUS 16 DNA: NEGATIVE
HUMAN PAPILLOMA VIRUS 18 DNA: NEGATIVE
HUMAN PAPILLOMA VIRUS FINAL DIAGNOSIS: NORMAL
HUMAN PAPILLOMA VIRUS OTHER HR: NEGATIVE

## 2023-10-04 LAB — NONINV COLON CA DNA+OCC BLD SCRN STL QL: NEGATIVE

## 2023-10-27 ENCOUNTER — TELEPHONE (OUTPATIENT)
Dept: FAMILY MEDICINE | Facility: CLINIC | Age: 53
End: 2023-10-27

## 2023-10-27 ENCOUNTER — MYC MEDICAL ADVICE (OUTPATIENT)
Dept: FAMILY MEDICINE | Facility: CLINIC | Age: 53
End: 2023-10-27

## 2023-10-27 NOTE — TELEPHONE ENCOUNTER
Patient Quality Outreach    Patient is due for the following:   Hypertension -  BP check    Next Steps:   Schedule a nurse only visit for B/P Check    Type of outreach:    Sent [employer] message.      Questions for provider review:    None           Chuck Streeter Jr., MA  Chart routed to Care Team.

## 2023-11-21 ENCOUNTER — VIRTUAL VISIT (OUTPATIENT)
Dept: BEHAVIORAL HEALTH | Facility: CLINIC | Age: 53
End: 2023-11-21
Payer: COMMERCIAL

## 2023-11-21 ENCOUNTER — VIRTUAL VISIT (OUTPATIENT)
Dept: PSYCHIATRY | Facility: CLINIC | Age: 53
End: 2023-11-21
Attending: FAMILY MEDICINE
Payer: COMMERCIAL

## 2023-11-21 DIAGNOSIS — F41.1 GENERALIZED ANXIETY DISORDER: Primary | ICD-10-CM

## 2023-11-21 PROCEDURE — 90791 PSYCH DIAGNOSTIC EVALUATION: CPT | Mod: 95 | Performed by: COUNSELOR

## 2023-11-21 PROCEDURE — 99443 PR PHYSICIAN TELEPHONE EVALUATION 21-30 MIN: CPT | Performed by: PSYCHIATRY & NEUROLOGY

## 2023-11-21 RX ORDER — BUSPIRONE HYDROCHLORIDE 10 MG/1
TABLET ORAL
Qty: 60 TABLET | Refills: 1 | Status: SHIPPED | OUTPATIENT
Start: 2023-11-21 | End: 2024-02-01

## 2023-11-21 ASSESSMENT — COLUMBIA-SUICIDE SEVERITY RATING SCALE - C-SSRS
ATTEMPT LIFETIME: NO
TOTAL  NUMBER OF INTERRUPTED ATTEMPTS LIFETIME: NO
2. HAVE YOU ACTUALLY HAD ANY THOUGHTS OF KILLING YOURSELF?: NO
1. HAVE YOU WISHED YOU WERE DEAD OR WISHED YOU COULD GO TO SLEEP AND NOT WAKE UP?: NO
6. HAVE YOU EVER DONE ANYTHING, STARTED TO DO ANYTHING, OR PREPARED TO DO ANYTHING TO END YOUR LIFE?: NO
TOTAL  NUMBER OF ABORTED OR SELF INTERRUPTED ATTEMPTS LIFETIME: NO

## 2023-11-21 ASSESSMENT — ANXIETY QUESTIONNAIRES
4. TROUBLE RELAXING: NEARLY EVERY DAY
7. FEELING AFRAID AS IF SOMETHING AWFUL MIGHT HAPPEN: SEVERAL DAYS
GAD7 TOTAL SCORE: 14
IF YOU CHECKED OFF ANY PROBLEMS ON THIS QUESTIONNAIRE, HOW DIFFICULT HAVE THESE PROBLEMS MADE IT FOR YOU TO DO YOUR WORK, TAKE CARE OF THINGS AT HOME, OR GET ALONG WITH OTHER PEOPLE: SOMEWHAT DIFFICULT
2. NOT BEING ABLE TO STOP OR CONTROL WORRYING: NEARLY EVERY DAY
1. FEELING NERVOUS, ANXIOUS, OR ON EDGE: NEARLY EVERY DAY
GAD7 TOTAL SCORE: 14
6. BECOMING EASILY ANNOYED OR IRRITABLE: SEVERAL DAYS
3. WORRYING TOO MUCH ABOUT DIFFERENT THINGS: NEARLY EVERY DAY
5. BEING SO RESTLESS THAT IT IS HARD TO SIT STILL: NOT AT ALL
6. BECOMING EASILY ANNOYED OR IRRITABLE: SEVERAL DAYS
7. FEELING AFRAID AS IF SOMETHING AWFUL MIGHT HAPPEN: SEVERAL DAYS
3. WORRYING TOO MUCH ABOUT DIFFERENT THINGS: NEARLY EVERY DAY
2. NOT BEING ABLE TO STOP OR CONTROL WORRYING: NEARLY EVERY DAY
4. TROUBLE RELAXING: NEARLY EVERY DAY
1. FEELING NERVOUS, ANXIOUS, OR ON EDGE: NEARLY EVERY DAY
IF YOU CHECKED OFF ANY PROBLEMS ON THIS QUESTIONNAIRE, HOW DIFFICULT HAVE THESE PROBLEMS MADE IT FOR YOU TO DO YOUR WORK, TAKE CARE OF THINGS AT HOME, OR GET ALONG WITH OTHER PEOPLE: SOMEWHAT DIFFICULT
5. BEING SO RESTLESS THAT IT IS HARD TO SIT STILL: NOT AT ALL
GAD7 TOTAL SCORE: 14
GAD7 TOTAL SCORE: 14

## 2023-11-21 ASSESSMENT — PATIENT HEALTH QUESTIONNAIRE - PHQ9
10. IF YOU CHECKED OFF ANY PROBLEMS, HOW DIFFICULT HAVE THESE PROBLEMS MADE IT FOR YOU TO DO YOUR WORK, TAKE CARE OF THINGS AT HOME, OR GET ALONG WITH OTHER PEOPLE: SOMEWHAT DIFFICULT
SUM OF ALL RESPONSES TO PHQ QUESTIONS 1-9: 4
10. IF YOU CHECKED OFF ANY PROBLEMS, HOW DIFFICULT HAVE THESE PROBLEMS MADE IT FOR YOU TO DO YOUR WORK, TAKE CARE OF THINGS AT HOME, OR GET ALONG WITH OTHER PEOPLE: SOMEWHAT DIFFICULT

## 2023-11-21 NOTE — PROGRESS NOTES
"Virtual Visit Details    Type of service:  Telephone Visit   Phone call duration: 28 minutes [facility] Intake      IDENTIFICATION   Name: Adela Stone   : 1970/53 year old      Sex:    @ female          Telemedicine Visit: The patient's condition can be safely assessed and treated via synchronous audio and visual telemedicine encounter.      Face to Face/patient Contact total time: 28 minutes  Pre Charting time: 7 minutes; Post charting time, communication and other activities: 7 minutes; Total time 42 minutes  8:31 AM - 8:59AM    CHIEF COMPLAINT   Source of Referral:    Primary Care Provider:   Luz Elena Alonzo     Consult for anxiety      HISTORY OF PRESENT ILLNESS   Always has anxiety, but medications help partially. Not waking up in complete panic. With meds anxious and nervous, racing thoughts. Her goal is to do something more natural like magnesium, vitamin D.       [facility] during today's team-based visit:  \"Review of Symptoms per patient report:  Depression: Difficulties concentrating, Ruminations, Feeling sad, down, or depressed, and social situations will drain them , will do a lot of comfort eating; irritability will increase if not on anxiety medications, SIB- no in lifetime, SI- no in lifetime  Lartice:  No Symptoms  Psychosis: No Symptoms  Anxiety: Excessive worry, Nervousness, Physical complaints, such as headaches, stomachaches, muscle tension, Social anxiety, Ruminations, and Poor concentration, shoulders and neck will be clenched up, will feel it in their jaw  Panic:  See above  Post Traumatic Stress Disorder:  Experienced traumatic event growing up with alcoholic father and was scared all the time of him    Eating Disorder: No Symptoms  ADD / ADHD:  Restlessness/fidgety  Conduct Disorder: No symptoms  Autism Spectrum Disorder: No symptoms  Obsessive Compulsive Disorder: No Symptoms     Sleep: get very little sleep with busy between work and " "the kids, avg during the week 6 hours, waking and not feeling well rested, will feel tired     Caffeine: no coffee and recently stopped drinking soda     Current alcohol use: pt denies   Current drug use: pt denies\"    Vital Signs:   LMP  (LMP Unknown)        No data to display                         The following assessments were completed by patient for this visit:  PHQ9:       1/28/2021     9:00 AM 1/26/2023     2:48 PM 11/21/2023     7:28 AM   PHQ-9 SCORE   PHQ-9 Total Score MyChart  13 (Moderate depression) 4 (Minimal depression)   PHQ-9 Total Score 5 13 4    4     GAD7:       11/21/2023     7:30 AM   PARIS-7 SCORE   Total Score 14 (moderate anxiety)   Total Score 14    14     PROMIS 10-Global Health (only subscores and total score):       11/21/2023     8:11 AM   PROMIS-10 Scores Only   Global Mental Health Score 7   Global Physical Health Score 16   PROMIS TOTAL - SUBSCORES 23           1/28/2021     9:00 AM 1/26/2023     2:48 PM 11/21/2023     7:28 AM   PHQ   PHQ-9 Total Score 5 13 4    4   Q9: Thoughts of better off dead/self-harm past 2 weeks Not at all Not at all Not at all    Not at all          11/21/2023     7:30 AM   PARIS-7 SCORE   Total Score 14 (moderate anxiety)   Total Score 14    14        REVIEW OF SYSTEMS:   Time insufficient to review    PAST PSYCHIATRIC HISTORY:   Anxiety has always been part of her life from childhood  Prior ER/hospitalization for anxiety  Med Trials:  Citalopram - rashes  Paroxetine - in late her 20s - worked very well but concern over long term  Desvenlafaxine  Lorazepam- effective and taken sparingly  Self-Directed Violence: None      PAST MEDICAL HISTORY:     Past Medical History:   Diagnosis Date    Anxiety       has a past medical history of Anxiety.    Current medications include:   Current Outpatient Medications   Medication Sig    desvenlafaxine (PRISTIQ) 50 MG 24 hr tablet Take 1 tablet (50 mg) by mouth daily    LORazepam (ATIVAN) 0.5 MG tablet TAKE ONE DAILY AS " NEEDED FOR SEVERE ANXIETY    hydrOXYzine (ATARAX) 25 MG tablet Take 1 tablet (25 mg) by mouth 3 times daily as needed for anxiety (Patient not taking: Reported on 11/21/2023)    lisinopril (ZESTRIL) 10 MG tablet Take 1 tablet (10 mg) by mouth daily (Patient not taking: Reported on 11/21/2023)     No current facility-administered medications for this visit.         FAMILY HISTORY:   Daughter with panic/anxiety  alcoholism    SOCIAL HISTORY:   Noted  imprisoned.       MENTAL STATUS EXAMINATION:   Attitude: Cooperative  Oriented to: Grossly person place and time  Attention Span and Concentration: Good  Speech: Within normal limits  Language: No abnormalities  Mood:   Fair  Associations:  no loose associations  Thought Process:  logical, linear and goal oriented  Thought Content: No evidence of delusions or suicidal or homicidal ideation plan or intent  Memory: Grossly intact  Fund of Knowledge: Good  Insight:  good  Judgment:  intact, adequate for safety  Impulse Control:  intact        DIAGNOSES:   Generalized anxiety disorder, with panic attacks      ASSESSMENT:   Patient dealing with generalized anxiety symptoms, history of panic.  Symptoms are in partial remission with Pristiq.  Generally medication management has had efficacy, does prefer a holistic approach to treatment.  Discussed risks and benefits of medication management versus general holistic treatment.  Referred for integrated medicine.  Add buspirone as potential replacement for Pristiq and bridge to holistic intervention if an effective treatment is found.    Today Adela Stone reports no suicidal ideations. In addition, she has notable risk factors for self-harm, including anxiety. However, risk is mitigated by commitment to family and absence of past attempts. Therefore, based on all available evidence including the factors cited above, she does not appear to be at imminent risk for self-harm, does not meet criteria for a 72-hr hold, and  therefore remains appropriate for ongoing outpatient level of care.       PLAN:     Patient advised of consultative model. Patient will continue to be seen for ongoing consultation and stabilization.  Does not meet criteria for involuntary treatment or hospitalization  Continue Pristiq 50 mg daily-Risks, benefits and alternatives discussed.  Patient provides verbal consent to treatment.  Long-term considerations discussed.  Plan to taper if buspirone is successful  At buspirone 11/21/2023 5 mg p.o. twice daily x 5 days then 10 mg p.o. twice daily-Risks, benefits and alternatives discussed.  Patient provides verbal consent to treatment.  Provided referral/resource in for integrative medicine  Return in 4 weeks      Administrative Billing:   Time spent with patient was greater than 50% of time and/or significant time was spent in counseling and coordination of care regarding above diagnoses and treatment plan. Pre charting time and post charting time/documentation/coordination are done on date of service.     Signed:   Vikas Whittington M.D.  [facility] Service    Disclaimer: This note consists of symbols derived from keyboarding, dictation and/or voice recognition software. As a result, there may be errors in the script that have gone undetected. Please consider this when interpreting information found in this chart.

## 2023-11-21 NOTE — NURSING NOTE
Is the patient currently in the state of [place]? YES    Visit mode:TELEPHONE    If the visit is dropped, the patient can be reconnected by: VIDEO VISIT: Text to cell phone:   Telephone Information:   Mobile [phone]       Will anyone else be joining the visit? NO  (If patient encounters technical issues they should call [phone])    How would you like to obtain your AVS? MyChart    Are changes needed to the allergy or medication list? No    Reason for visit: Consult    Chelsea Ibrahim [facility]    Care team has reviewed attendance agreement with patient. Patient advised that two failed appointments within 6 months may lead to termination of current episode of care.

## 2023-11-21 NOTE — PROGRESS NOTES
"Collaborative Care Psychiatry Service  Provider Name: DEVAUGHN Orozco, [facility]    PATIENT'S NAME: Adela Stone  PREFERRED NAME: Yana  PREFERRED PRONOUNS:    MRN:   [id number]  :   1970   ACCT. NUMBER: [id number]  DATE OF SERVICE: 23  START TIME: 744am  END TIME: 816am    BRIEF ADULT DIAGNOSTIC ASSESSMENT    Telemedicine Visit: The patient's condition can be safely assessed and treated via synchronous audio and visual telemedicine encounter.      Reason for Telemedicine Visit: Services only offered telehealth    Originating Site (Patient Location): Patient's home, in [place]    Distant Site (Provider Location): Provider Remote Setting- Home Office, off site    Consent:  The patient/guardian has verbally consented to: the potential risks and benefits of telemedicine (video visit) versus in person care; bill my insurance or make self-payment for services provided; and responsibility for payment of non-covered services.     Mode of Communication:  Phone in [place]     As the provider I attest to compliance with applicable laws and regulations related to telemedicine.    The patient has been notified of the following:      \"We have found that certain health care needs can be provided without the need for a face to face visit.  This service lets us provide the care you need with a phone conversation.       I will have full access to your [facility] medical record during this entire phone call.   I will be taking notes for your medical record.      Since this is like an office visit, we will bill your insurance company for this service.       There are potential benefits and risks of telephone visits (e.g. limits to patient confidentiality) that differ from in-person visits.?  Confidentiality still applies for telephone services, and nobody will record the visit.  It is important to be in a quiet, private space that is free of distractions (including cell phone or other devices) during the visit.??      If " "during the course of the call I believe a telephone visit is not appropriate, you will not be charged for this service\"     Consent has been obtained for this service by care team member: Yes       Identifying Information:  Patient is a 53 year old, .  The pronoun use throughout this assessment reflects the patient's chosen pronoun.  Patient was referred for an assessment by primary care provider.  Patient attended the session alone.     Chief Complaint:   The reason for seeking services at this time is: \" anxiety \"   The problem(s) began late 20s or early 30s. Patient has attempted to resolve these concerns in the past through therapy twice, medication, ED visits and tests to rule out stroke or other health concerns, reading to learn ways to cope and PCP .    Does the client have any condition that is currently presenting as a potential to harm themselves or others (severe withdrawal, serious medical condition, severe emotional/behavioral problem)? No.  Proceed with assessment.    Pt reports that they experienced anxiety their entire life and was dx in late 20s or 30s. They thought that this is how they were.     They have alcoholism in their family. Their father was an alcoholic. Their daughter at 17 had panic attacks and break downs and is on the same medication as pt.     Pt tired therapy two different times and didn't find it to be helpful and talking about things. She has used self talk and the anxiety will over take it. Pt has done reading of ways to calm themselves down, doing breathing can be helpful if pt can just stop and it will help a little bit.   Panic attacks are not often. Have the lorazepam if they have a panic attack they take it sparingly. Not even once a month, more like a couple times a year.   She will try to get off medication it seems that they will end up in the hospital with panic.     Pt has 2 daughters with anxiety and would like to find alternatives for them as well too. "     Goal: find natural alternatives, seen things about vitamin D and magnesium and have the lorazepam for panic attacks     Review of Symptoms per patient report:  Depression: Difficulties concentrating, Ruminations, Feeling sad, down, or depressed, and social situations will drain them , will do a lot of comfort eating; irritability will increase if not on anxiety medications, SIB- no in lifetime, SI- no in lifetime  Latrice:  No Symptoms  Psychosis: No Symptoms  Anxiety: Excessive worry, Nervousness, Physical complaints, such as headaches, stomachaches, muscle tension, Social anxiety, Ruminations, and Poor concentration, shoulders and neck will be clenched up, will feel it in their jaw  Panic:  See above  Post Traumatic Stress Disorder:  Experienced traumatic event growing up with alcoholic father and was scared all the time of him    Eating Disorder: No Symptoms  ADD / ADHD:  Restlessness/fidgety  Conduct Disorder: No symptoms  Autism Spectrum Disorder: No symptoms  Obsessive Compulsive Disorder: No Symptoms    Sleep: get very little sleep with busy between work and the kids, avg during the week 6 hours, waking and not feeling well rested, will feel tired     Caffeine: no coffee and recently stopped drinking soda    Current alcohol use: pt denies   Current drug use: pt denies     Rating Scales:  PHQ-9:       1/28/2021     9:00 AM 1/26/2023     2:48 PM 11/21/2023     7:28 AM   [facility] [place] Follow-up to PHQ   PHQ-9 9. Suicide Ideation past 2 weeks Not at all Not at all Not at all    Not at all      GAD7:        11/21/2023     7:30 AM   PARIS-7 SCORE   Total Score 14 (moderate anxiety)   Total Score 14    14       CAGE:        11/21/2023     8:11 AM   CAGE-AID Flowsheet   Have you ever felt you should Cut down on your drinking or drug use? 0   Have people Annoyed you by criticizing your drinking or drug use? 0   Have you ever felt bad or Guilty about your drinking or drug use? 0   Have you ever had a drink or used drugs first  thing in the morning to steady your nerves or to get rid of a hangover? (Eye opener) 0   CAGE-AID SCORE 0   Completed in appt with pt.     Assessments:  The following assessments were completed by patient for this visit:  PHQ9:       1/28/2021     9:00 AM 1/26/2023     2:48 PM 11/21/2023     7:28 AM   PHQ-9 SCORE   PHQ-9 Total Score MyChart  13 (Moderate depression) 4 (Minimal depression)   PHQ-9 Total Score 5 13 4    4     GAD7:       11/21/2023     7:30 AM   PARIS-7 SCORE   Total Score 14 (moderate anxiety)   Total Score 14    14     CAGE-AID:       11/21/2023     8:11 AM   CAGE-AID Total Score   Total Score 0     PROMIS 10-Global Health (only subscores and total score):       11/21/2023     8:11 AM   PROMIS-10 Scores Only   Global Mental Health Score 7   Global Physical Health Score 16   PROMIS TOTAL - SUBSCORES 23     [place] Suicide Severity Rating Scale (Lifetime/Recent)      11/21/2023     8:21 AM   [place] Suicide Severity Rating (Lifetime/Recent)   Q1 Wish to be Dead (Lifetime) N   Q2 Non-Specific Active Suicidal Thoughts (Lifetime) N   Actual Attempt (Lifetime) N   Has subject engaged in non-suicidal self-injurious behavior? (Lifetime) N   Interrupted Attempts (Lifetime) N   Aborted or Self-Interrupted Attempt (Lifetime) N   Preparatory Acts or Behavior (Lifetime) N   Calculated C-SSRS Risk Score (Lifetime/Recent) No Risk Indicated     [place] Suicide Severity Rating Scale (Short Version)      11/9/2021    10:45 PM 1/24/2023     9:09 AM   [place] Suicide Severity Rating (Short Version)   Over the past 2 weeks have you felt down, depressed, or hopeless? no no   Over the past 2 weeks have you had thoughts of killing yourself? no no   Have you ever attempted to kill yourself? no no         Personal Medical History:  Past Medical History:   Diagnosis Date    Anxiety        Patient has received mental health services in the past:  therapy twice .  Psychiatric Hospitalizations:  have gone in for panic attacks,  dizzy pass out, heart racing and they have done tests to see if it is a stroke, past 4 years had to go to the ED 3 or 4 times .  Patient denies a history of civil commitment. Currently, patient is not receiving other mental health services.  These include none. Pt is hoping to meet with psychiatrist Dr. Whittington to find natural alternatives to medication.     Patient does not report a history of head injury / trauma / cognitive impairment / seizures.     Current Medications:  Current Outpatient Medications   Medication Sig Dispense Refill    desvenlafaxine (PRISTIQ) 50 MG 24 hr tablet Take 1 tablet (50 mg) by mouth daily 90 tablet 3    hydrOXYzine (ATARAX) 25 MG tablet Take 1 tablet (25 mg) by mouth 3 times daily as needed for anxiety (Patient not taking: Reported on 11/21/2023) 12 tablet 0    lisinopril (ZESTRIL) 10 MG tablet Take 1 tablet (10 mg) by mouth daily (Patient not taking: Reported on 11/21/2023) 30 tablet 1    LORazepam (ATIVAN) 0.5 MG tablet TAKE ONE DAILY AS NEEDED FOR SEVERE ANXIETY 30 tablet 0        Allergies:  No Known Allergies    Family Psychiatric History:  Patient did report a family history of mental health concerns. Her father was an alcoholic and her children also have concerns about anxiety.    Family History       Problem (# of Occurrences) Relation (Name,Age of Onset)    Cancer (2) Paternal Grandmother, Paternal Grandfather    Diabetes (1) Father    Hypertension (6) Brother, Brother, Father, Maternal Grandmother, Mother, Maternal Grandfather    Lung Cancer (1) Maternal Grandmother    Diabetes Type 2  (1) Father            Social/Family History:  Patient reported they grew up in [place], [place].  They were raised by biological parents. Pt has 2 brother. Pt was the oldest. Patient reported that   childhood was, father was an alcoholic and pt was scared of him.  Patient denies experiencing childhood abuse/neglect. Patient described their current relationships with family of origin aspPt will talk  with siblings and parents.     The patient has been  1 time and has 3 children. He is still in alf and was in the house for a few years. Patient reported having few good friends. Pt is Yazidi and doesn't attend [employer] regularly. Pt does attend their child's sporting events. She will push through social situations.     Cultural influences and impact on patient's life structure, values, norms, and healthcare: Racial or Ethnic Self-Identification white, Social Orientation: doesn't have many friends and social situations drain her, though she will push trough, has social anxiety, and Spiritual Beliefs: Yazidi and not attending [employer] regularly currently . Patient identified their preferred language to be English. Patient reported they does not need the assistance of an  or other support involved in treatment.       Educational/Occupational History:  Patient reported   highest education level was college graduate. The patient did not serve in the . Patient is currently employed full time and reports they are able to function appropriately at work.      Social History     Socioeconomic History    Marital status:      Spouse name: Not on file    Number of children: Not on file    Years of education: Not on file    Highest education level: Not on file   Occupational History    Not on file   Tobacco Use    Smoking status: Never     Passive exposure: Never    Smokeless tobacco: Never   Vaping Use    Vaping Use: Never used   Substance and Sexual Activity    Alcohol use: Yes     Alcohol/week: 2.5 standard drinks of alcohol    Drug use: No    Sexual activity: Yes     Partners: Male     Birth control/protection: None     Comment:   4 para 3 is a    Other Topics Concern    Not on file   Social History Narrative     gavida 4 para 3 works for [employer]     Social Determinants of Health     Financial Resource Strain: Not on file   Food Insecurity: Not on file    Transportation Needs: Not on file   Physical Activity: Not on file   Stress: Not on file   Social Connections: Not on file   Interpersonal Safety: Not on file   Housing Stability: Not on file       Patient reported that they have not been involved with the legal system.  Patient denies being on probation / parole / under the jurisdiction of the court.    Current Mental Status Exam:   Appearance:   Unable to assess-telephone visit  Eye Contact:   Unable to assess-telephone visit  Psychomotor:   Normal   Attitude / Demeanor:  Cooperative  Interested Pleasant  Speech      Rate / Production:  Normal/ Responsive      Volume:   Normal  volume      Language:   intact  Mood:    Anxious  Sad   Affect:    Appropriate    Thought Content:  Clear   Thought Process:  Coherent  Logical       Associations:  No loosening of associations  Insight:    Good   Judgment:   Intact   Orientation:   All  Attention/concentration: Good      Safety Assessment:   Current Safety Concerns:  [place] Suicide Severity Rating Scale (Lifetime/Recent)      11/21/2023     8:21 AM   [place] Suicide Severity Rating (Lifetime/Recent)   Q1 Wish to be Dead (Lifetime) N   Q2 Non-Specific Active Suicidal Thoughts (Lifetime) N   Actual Attempt (Lifetime) N   Has subject engaged in non-suicidal self-injurious behavior? (Lifetime) N   Interrupted Attempts (Lifetime) N   Aborted or Self-Interrupted Attempt (Lifetime) N   Preparatory Acts or Behavior (Lifetime) N   Calculated C-SSRS Risk Score (Lifetime/Recent) No Risk Indicated     Patient denies current homicidal ideation and behaviors.  Patient denies current self-injurious ideation and behaviors.    Patient denied risk behaviors associated with substance use.  Patient denies any high risk behaviors associated with mental health symptoms.  Patient reports the following current concerns for their personal safety: None.  Patient reports there are not firearms in the house.     History of Safety  Concerns:  Patient denied a history of homicidal ideation.     Patient denied a history of personal safety concerns.    Patient denied a history of assaultive behaviors.    Patient denied a history of sexual assault behaviors.     Patient denied a history of risk behaviors associated with substance use.  Patient denies any history of high risk behaviors associated with mental health symptoms.  Patient reports the following protective factors: spirituality, positive relationships positive family connections, forward/future oriented thinking, dedication to family/friends, safe and stable environment, effectively controls impulses, help seeking behaviors when distressed  , abstinence from substances, adherence with prescribed medication, living with other people, daily obligations, structured day, effective problem-solving skills, committment to well-being, strong sense of self-worth/esteem, and sense of personal control or determination    Risk Plan:  See Preliminary Treatment Plan for Safety and Risk Management Plan    Diagnosis:  Diagnostic Criteria:   Generalized Anxiety Disorder  A. Excessive anxiety and worry about a number of events or activities (such as work or school performance).   B. The person finds it difficult to control the worry.  C. Select 3 or more symptoms (required for diagnosis). Only one item is required in children.   - Restlessness or feeling keyed up or on edge.    - Difficulty concentrating or mind going blank.    - Muscle tension.   D. The focus of the anxiety and worry is not confined to features of an Axis I disorder.  E. The anxiety, worry, or physical symptoms cause clinically significant distress or impairment in social, occupational, or other important areas of functioning.   F. The disturbance is not due to the direct physiological effects of a substance (e.g., a drug of abuse, a medication) or a general medical condition (e.g., hyperthyroidism) and does not occur exclusively during a  Mood Disorder, a Psychotic Disorder, or a Pervasive Developmental Disorder.    Diagnoses:  1. Generalized anxiety disorder        Patient's Strengths and Limitations:  Patient identified the following strengths or resources that will help them succeed in treatment: Druze / Mormonism, commitment to health and well being, community involvement, family support, insight, intelligence, motivation, sense of humor, and work ethic. Things that may interfere with the patient's success in treatment include: few friends.     Recommendations:     1. Plan for Safety and Risk Management:Recommended that patient call 911 or go to the local ED should there be a change in any of these risk factors.  Report to child / adult protection services was N/A.      2. Resources/Service Plan:       services are not indicated.     Modifications to assist communication are not indicated.     Additional disability accommodations are not indicated.      3. Collaboration:  Collaboration / coordination of treatment will be initiated with the following support professionals: Vikas Whittington M.D.      4.  Referrals:   The following referral(s) will be initiated:  TBD .       Staff Name/Credentials:  DEVAUGHN Orozco, [facility]  November 21, 2023

## 2023-11-21 NOTE — Clinical Note
Dr. Alonzo,  Thank you for your consult and care of the patient.  I have added buspirone.  If it is successful enough we can attempt tapering of Pristiq to go for a lighter medication.  Referred for integrated medicine consult.  Sincerely, Vikas Whittington M.D. Consultative Psychiatrist Program Medical Director, Lead Collaborative Care Psychiatry Service

## 2023-11-21 NOTE — PATIENT INSTRUCTIONS
"Patient Education   Collaborative Care Psychiatry Service  What to Expect  Here's what to expect from your Collaborative Care Psychiatry Service (CCPS).   About CCPS  CCPS means 2 people work together to help you get better. You'll meet with a behavioral health clinician and a psychiatric doctor. A behavioral health clinician helps people with mental health problems by talking with them. A psychiatric doctor helps people by giving them medicine.  How it works  At every visit, you'll see the behavioral health clinician (BHC) first. They'll talk with you about how you're doing and teach you how to feel better.   Then you'll see the psychiatric doctor. This doctor can help you deal with troubling thoughts and feelings by giving you medicine. They'll make sure you know the plan for your care.   CCPS usually takes 3 to 6 visits. If you need more visits, we may have you start seeing a different psychiatric doctor for ongoing care.  If you have any questions or concerns, we'll be glad to talk with you.  About visits  Be open  At your visits, please talk openly about your problems. It may feel hard, but it's the best way for us to help you.  Cancelling visits  If you can't come to your visit, please call us right away at [phone]. If you don't cancel at least 24 hours (1 full day) before your visit, that's \"late cancellation.\"  Being late to visits  Being very late is the same as not showing up. You will be a \"no show\" if:  Your appointment starts with a BHC, and you're more than 15 minutes late for a 30-minute (half hour) visit. This will also cancel your appointment with the psychiatric doctor.  Your appointment is with a psychiatric doctor only, and you're more than 15 minutes late for a 30-minute (half hour) visit.  Your appointment is with a psychiatric doctor only, and you're more than 30 minutes late for a 60-minute (full hour) visit.  If you cancel late or don't show up 2 times within 6 months, we may end your " care.   Getting help between visits  If you need help between visits, you can call us Monday to Friday from 8 a.m. to 4:30 p.m. at [phone].  Emergency care  Call 911 or go to the nearest emergency department if your life or someone else's life is in danger.  Call 988 anytime to reach the national Suicide and Crisis hotline.  Medicine refills  To refill your medicine, call your pharmacy. You can also call [facility]'s Behavioral Access at [phone], Monday to Friday, 8 a.m. to 4:30 p.m. It can take 1 to 3 business days to get a refill.   Forms, letters, and tests  You may have papers to fill out, like FMLA, short-term disability, and workability. We can help you with these forms at your visits, but you must have an appointment. You may need more than 1 visit for this, to be in an intensive therapy program, or both.  Before we can give you medicine for ADHD, we may refer you to get tested for it or confirm it another way.  We may not be able to give you an emotional support animal letter.  We don't do mental health checks ordered by the court.   We don't do mental health testing, but we can refer you to get tested.   Thank you for choosing us for your care.  For informational purposes only. Not to replace the advice of your health care provider. Copyright   2022 [facility]. All rights reserved. [employer] [id number] - 12/22.

## 2023-11-27 NOTE — TELEPHONE ENCOUNTER
Patient Quality Outreach    Patient is due for the following:   Hypertension -  BP check    Next Steps:   Schedule a nurse only visit for B/P Check    Type of outreach:    Phone, left message for patient/parent to call back.    Next Steps:  Reach out within 90 days via Phone.    Max number of attempts reached: Yes. Will try again in 90 days if patient still on fail list.    Questions for provider review:    None           Chuck Streeter Jr., MA

## 2024-02-01 DIAGNOSIS — F41.1 GENERALIZED ANXIETY DISORDER: ICD-10-CM

## 2024-02-01 RX ORDER — BUSPIRONE HYDROCHLORIDE 10 MG/1
TABLET ORAL
Qty: 180 TABLET | Refills: 0 | Status: SHIPPED | OUTPATIENT
Start: 2024-02-01 | End: 2024-04-15

## 2024-02-01 RX ORDER — BUSPIRONE HYDROCHLORIDE 10 MG/1
TABLET ORAL
Qty: 60 TABLET | Refills: 0 | Status: SHIPPED | OUTPATIENT
Start: 2024-02-01 | End: 2024-02-01

## 2024-02-01 NOTE — TELEPHONE ENCOUNTER
RN received refill request via Right Fax from    [facility] [id number] [facility] - [place], [place] - [address] [place] for buspirone (BUSPAR) 10 MG tablet     Date of Last Office Visit: 11/21/2023  Date of Next Office Visit: nothing scheduled - [facility] notified  No shows since last visit: none  Cancellations since last visit: none    Medication requested: buspirone (BUSPAR) 10 MG tablet  Date last ordered: 11/21/2023 Qty: 60 Refills: 1  Take 1/2 tablet twice a day for 5 days then increase to one tablet twice a day      Review of [place] ?: No, this medication is not [place] on the [place]-      Lapse in medication adherence greater than 5 days?: n/a  If yes, call patient and gather details: n/a  Medication refill request verified as identical to current order?: Yes  Result of Last DAM, VPA, Li+ Level, CBC, or Carbamazepine Level (at or since last visit):  No new lab work completed since 11/21/2023 appointment with Dr. Whittington     Last visit treatment plan:     ASSESSMENT:   Patient dealing with generalized anxiety symptoms, history of panic.  Symptoms are in partial remission with Pristiq.  Generally medication management has had efficacy, does prefer a holistic approach to treatment.  Discussed risks and benefits of medication management versus general holistic treatment.  Referred for integrated medicine.  Add buspirone as potential replacement for Pristiq and bridge to holistic intervention if an effective treatment is found.     Today Adela Stone reports no suicidal ideations. In addition, she has notable risk factors for self-harm, including anxiety. However, risk is mitigated by commitment to family and absence of past attempts. Therefore, based on all available evidence including the factors cited above, she does not appear to be at imminent risk for self-harm, does not meet criteria for a 72-hr hold, and therefore remains appropriate for ongoing outpatient level of care.         PLAN:      Patient advised of consultative model.  Patient will continue to be seen for ongoing consultation and stabilization.  Does not meet criteria for involuntary treatment or hospitalization  Continue Pristiq 50 mg daily-Risks, benefits and alternatives discussed.  Patient provides verbal consent to treatment.  Long-term considerations discussed.  Plan to taper if buspirone is successful  At buspirone 11/21/2023 5 mg p.o. twice daily x 5 days then 10 mg p.o. twice daily-Risks, benefits and alternatives discussed.  Patient provides verbal consent to treatment.  Provided referral/resource in for integrative medicine  Return in 4 weeks       []Medication refilled per  Medication Refill in Ambulatory Care  policy.  [x]Medication unable to be refilled by RN due to criteria not met as indicated below:    []Eligibility - not seen in the last year   [x]Supervision - no future appointment   []Compliance - no shows, cancellations or lapse in therapy   []Verification - order discrepancy   []Controlled medication   []Medication not included in policy   []90-day supply request   []Other    A 30-day refill with a sig line of Take 1 tablet (10 mg) by mouth twice daily was pended for provider review.     Azalea Watkins RN on 2/1/2024 at 8:49 AM

## 2024-02-01 NOTE — TELEPHONE ENCOUNTER
RN received the following message via Right Fax from [facility] pharmacy in [place] regarding needing a 3 month prescription for buspirone (BUSPAR) 10 MG tablet - insurance will not cover a month supply.      RN routing to Dr. Whittington for review if a 90-day refill is appropriate.    Azalea Watkins RN on 2/1/2024 at 10:01 AM

## 2024-02-01 NOTE — TELEPHONE ENCOUNTER
Provider Dr. Whittington indicated in this patients last visit note on 11/21/2023 that a future/return appointment was indicated 4 weeks after that appointment.    [facility] please call this patient to schedule a future appointment with Dr. Whittington.    Azalea Watkins RN on 2/1/2024 at 8:50 AM

## 2024-02-10 ENCOUNTER — TELEPHONE (OUTPATIENT)
Dept: FAMILY MEDICINE | Facility: CLINIC | Age: 54
End: 2024-02-10
Payer: COMMERCIAL

## 2024-02-10 DIAGNOSIS — F41.1 GENERALIZED ANXIETY DISORDER: ICD-10-CM

## 2024-02-13 RX ORDER — DESVENLAFAXINE 50 MG/1
50 TABLET, FILM COATED, EXTENDED RELEASE ORAL DAILY
Qty: 30 TABLET | Refills: 1 | Status: SHIPPED | OUTPATIENT
Start: 2024-02-13 | End: 2024-04-15

## 2024-02-13 NOTE — TELEPHONE ENCOUNTER
Patient needs to establish care. Will provide bridge refill; please relay this information and help with scheduling if appropriate.

## 2024-03-14 ENCOUNTER — OFFICE VISIT (OUTPATIENT)
Dept: FAMILY MEDICINE | Facility: CLINIC | Age: 54
End: 2024-03-14
Payer: COMMERCIAL

## 2024-03-14 VITALS
HEIGHT: 62 IN | TEMPERATURE: 97.8 F | BODY MASS INDEX: 30.97 KG/M2 | HEART RATE: 79 BPM | OXYGEN SATURATION: 100 % | WEIGHT: 168.31 LBS | DIASTOLIC BLOOD PRESSURE: 88 MMHG | SYSTOLIC BLOOD PRESSURE: 157 MMHG | RESPIRATION RATE: 16 BRPM

## 2024-03-14 DIAGNOSIS — N95.0 POSTMENOPAUSAL BLEEDING: Primary | ICD-10-CM

## 2024-03-14 DIAGNOSIS — F41.1 GENERALIZED ANXIETY DISORDER: ICD-10-CM

## 2024-03-14 DIAGNOSIS — I10 PRIMARY HYPERTENSION: ICD-10-CM

## 2024-03-14 PROCEDURE — 99214 OFFICE O/P EST MOD 30 MIN: CPT

## 2024-03-14 ASSESSMENT — PATIENT HEALTH QUESTIONNAIRE - PHQ9
SUM OF ALL RESPONSES TO PHQ QUESTIONS 1-9: 10
SUM OF ALL RESPONSES TO PHQ QUESTIONS 1-9: 10
10. IF YOU CHECKED OFF ANY PROBLEMS, HOW DIFFICULT HAVE THESE PROBLEMS MADE IT FOR YOU TO DO YOUR WORK, TAKE CARE OF THINGS AT HOME, OR GET ALONG WITH OTHER PEOPLE: SOMEWHAT DIFFICULT

## 2024-03-14 NOTE — ASSESSMENT & PLAN NOTE
With patient's last annual exam, she was instructed to start lisinopril 10 mg daily for persistent elevations in clinic.  She notes that this decision was made at the conclusion of the visit and she did not feel as if she had enough information regarding this medication so opted not to start it.  Today, she remains persistently elevated with a blood pressure of 157/88.  We discussed the potential adverse effects of untreated hypertension and that I would recommend given multiple elevations, that she begin the antihypertensive as prescribed.  We reviewed lisinopril, its expected therapeutic effects and potential side effects.  We briefly reviewed lifestyle modifications that can assist to and reducing blood pressure I have provided additional information in her after visit summary.  She is amenable to starting this medication; she plans to establish care with a local provider in the coming weeks to and we discussed this will serve as a good follow-up for her blood pressure and its response to lisinopril.  She will also need updated labs at that time.

## 2024-03-14 NOTE — ASSESSMENT & PLAN NOTE
>>ASSESSMENT AND PLAN FOR GENERALIZED ANXIETY DISORDER WRITTEN ON 3/14/2024  4:38 PM BY KI KENNEY APRN CNP    Patient's previous PCP referred her to psychiatry for medication stabilization.  She last saw them in November 2023.  Her current medications include Pristiq and as needed lorazepam.  She desires to eventually discontinue her Pristiq and reports that to her psychiatrist plan is to start BuSpar with a plan to wean Pristiq if it is effective.  She uses the lorazepam very sparingly; PDMP reviewed and shows last fill in June 2023 for 30 tablets.  We discussed this seems like a reasonable use pattern.  Because psychiatry has assumed care of her mental health until she returns to primary care, it seems reasonable for them to refill her mental health medications.  I have asked that she reach out to psychiatry as she is overdue for follow-up regardless for refills of these medications.  She does currently have medications available.  If they are unwilling to refill, she can reach out and I will plan to send these in.  She is aware that a primary care provider would need a controlled substance agreement in order to prescribe lorazepam.

## 2024-03-14 NOTE — PROGRESS NOTES
Assessment & Plan   Problem List Items Addressed This Visit       Generalized anxiety disorder     Patient's previous PCP referred her to psychiatry for medication stabilization.  She last saw them in November 2023.  Her current medications include Pristiq and as needed lorazepam.  She desires to eventually discontinue her Pristiq and reports that to her psychiatrist plan is to start BuSpar with a plan to wean Pristiq if it is effective.  She uses the lorazepam very sparingly; PDMP reviewed and shows last fill in June 2023 for 30 tablets.  We discussed this seems like a reasonable use pattern.  Because psychiatry has assumed care of her mental health until she returns to primary care, it seems reasonable for them to refill her mental health medications.  I have asked that she reach out to psychiatry as she is overdue for follow-up regardless for refills of these medications.  She does currently have medications available.  If they are unwilling to refill, she can reach out and I will plan to send these in.  She is aware that a primary care provider would need a controlled substance agreement in order to prescribe lorazepam.         Primary hypertension     With patient's last annual exam, she was instructed to start lisinopril 10 mg daily for persistent elevations in clinic.  She notes that this decision was made at the conclusion of the visit and she did not feel as if she had enough information regarding this medication so opted not to start it.  Today, she remains persistently elevated with a blood pressure of 157/88.  We discussed the potential adverse effects of untreated hypertension and that I would recommend given multiple elevations, that she begin the antihypertensive as prescribed.  We reviewed lisinopril, its expected therapeutic effects and potential side effects.  We briefly reviewed lifestyle modifications that can assist to and reducing blood pressure I have provided additional information in her  after visit summary.  She is amenable to starting this medication; she plans to establish care with a local provider in the coming weeks to and we discussed this will serve as a good follow-up for her blood pressure and its response to lisinopril.  She will also need updated labs at that time.         Postmenopausal bleeding - Primary     Patient reports going through menopause with 12 months of no menstrual cycle. In the last few months, she has had two episodes of menstrual bleeding accompanied by other symptoms such as breast tenderness. There were no preceding factors like trauma. She had a negative PAP last year. Will order pelvic ultrasound to assess for contributing structural factors and refer to gynecology for additional workup.          Relevant Orders    US Pelvic Complete with Transvaginal    Ob/Gyn  Referral      Of note, patient is due to establish with a new primary care provider as hers has now retired.  She prefers a medical doctor, so we will plan to schedule with one of my partners in the clinic in the coming months    Depression Screening Follow Up        3/14/2024     3:40 PM   PHQ   PHQ-9 Total Score 10   Q9: Thoughts of better off dead/self-harm past 2 weeks Not at all     Follow Up Actions Taken  Crisis resource information provided in After Visit Summary     Emely Millan is a 53 year old, presenting for the following health issues:  Medication Update (And refills) and Menstrual Problem (Spotting)        3/14/2024     3:56 PM   Additional Questions   Roomed by sac   Accompanied by self         3/14/2024     3:56 PM   Patient Reported Additional Medications   Patient reports taking the following new medications no     Patient presents today to discuss the following:  - Mental health.  She notes the need for refills on her Pristiq and lorazepam.  These have been somewhat long-term medications for her.  Feels as if things are relatively stable, although continues to struggle with  "primarily anxiety.  She was seen by psychiatry in November after being referred by her previous primary care provider who is now retired.  Has not followed up with them.  -Menstrual bleeding.  Notes that she has been postmenopausal for a number of years.  In the last couple of months, she has had 2 separate incidents of bleeding that she describes as a \"light period.\"  The last time was associated with some breast tenderness.  There was no precipitating event such as trauma.  No other symptoms such as pain or fever.  -Hypertension.  Was told at her last annual physical to start lisinopril, but did not to.  She did not feel as if she got enough information regarding this medication.  Has not checked her blood pressure outside of this.  Wonders if she should start it    History of Present Illness       Reason for visit:  Medication followup    She eats 4 or more servings of fruits and vegetables daily.She consumes 1 sweetened beverage(s) daily.She exercises with enough effort to increase her heart rate 20 to 29 minutes per day.  She exercises with enough effort to increase her heart rate 4 days per week.   She is taking medications regularly.       Objective    BP (!) 157/88 (BP Location: Left arm, Patient Position: Sitting, Cuff Size: Adult Large)   Pulse 79   Temp 97.8  F (36.6  C) (Oral)   Resp 16   Ht 1.575 m (5' 2\")   Wt 76.3 kg (168 lb 5 oz)   LMP  (LMP Unknown)   SpO2 100%   BMI 30.78 kg/m    Body mass index is 30.78 kg/m .    Physical Exam  Vitals and nursing note reviewed.   Constitutional:       Appearance: Normal appearance.   Cardiovascular:      Rate and Rhythm: Normal rate and regular rhythm.   Pulmonary:      Effort: Pulmonary effort is normal. No respiratory distress.   Neurological:      Mental Status: She is alert.   Psychiatric:         Mood and Affect: Mood normal.         Behavior: Behavior normal.         Thought Content: Thought content normal.             Signed Electronically by: Skye" ZOYA Zendejas CNP

## 2024-03-14 NOTE — ASSESSMENT & PLAN NOTE
Patient reports going through menopause with 12 months of no menstrual cycle. In the last few months, she has had two episodes of menstrual bleeding accompanied by other symptoms such as breast tenderness. There were no preceding factors like trauma. She had a negative PAP last year. Will order pelvic ultrasound to assess for contributing structural factors and refer to gynecology for additional workup.

## 2024-03-14 NOTE — PATIENT INSTRUCTIONS
Inquire about mental health medication refills with psychiatry. Let me know if they will not refill, but they should be able to.  Start lisinopril. Follow up in four weeks for establish care/updated labs.  I put in an order for an ultrasound - you can call [phone] to get this schedule. I also placed a referral to gynecology and they should call you to schedule. You can call [phone] to expedite things.

## 2024-03-14 NOTE — ASSESSMENT & PLAN NOTE
Patient's previous PCP referred her to psychiatry for medication stabilization.  She last saw them in November 2023.  Her current medications include Pristiq and as needed lorazepam.  She desires to eventually discontinue her Pristiq and reports that to her psychiatrist plan is to start BuSpar with a plan to wean Pristiq if it is effective.  She uses the lorazepam very sparingly; PDMP reviewed and shows last fill in June 2023 for 30 tablets.  We discussed this seems like a reasonable use pattern.  Because psychiatry has assumed care of her mental health until she returns to primary care, it seems reasonable for them to refill her mental health medications.  I have asked that she reach out to psychiatry as she is overdue for follow-up regardless for refills of these medications.  She does currently have medications available.  If they are unwilling to refill, she can reach out and I will plan to send these in.  She is aware that a primary care provider would need a controlled substance agreement in order to prescribe lorazepam.

## 2024-03-15 ENCOUNTER — HOSPITAL ENCOUNTER (OUTPATIENT)
Dept: ULTRASOUND IMAGING | Facility: CLINIC | Age: 54
Discharge: HOME | End: 2024-03-15
Payer: COMMERCIAL

## 2024-03-15 DIAGNOSIS — N95.0 POSTMENOPAUSAL BLEEDING: ICD-10-CM

## 2024-03-15 PROCEDURE — 76856 US EXAM PELVIC COMPLETE: CPT

## 2024-03-15 PROCEDURE — 76830 TRANSVAGINAL US NON-OB: CPT

## 2024-03-26 ENCOUNTER — TELEPHONE (OUTPATIENT)
Dept: FAMILY MEDICINE | Facility: CLINIC | Age: 54
End: 2024-03-26

## 2024-03-26 NOTE — TELEPHONE ENCOUNTER
Patient Quality Outreach    Patient is due for the following:   Hypertension -  BP check    Next Steps:   No follow up needed at this time.    Type of outreach:    Sent [facility] message.      Questions for provider review:    None           Kisha Zuleta MA

## 2024-04-01 ENCOUNTER — OFFICE VISIT (OUTPATIENT)
Dept: FAMILY MEDICINE | Facility: CLINIC | Age: 54
End: 2024-04-01
Payer: COMMERCIAL

## 2024-04-01 VITALS
SYSTOLIC BLOOD PRESSURE: 135 MMHG | TEMPERATURE: 98 F | WEIGHT: 169.9 LBS | DIASTOLIC BLOOD PRESSURE: 84 MMHG | RESPIRATION RATE: 18 BRPM | HEART RATE: 69 BPM | HEIGHT: 62 IN | BODY MASS INDEX: 31.27 KG/M2

## 2024-04-01 DIAGNOSIS — B07.0 PLANTAR WARTS: Primary | ICD-10-CM

## 2024-04-01 DIAGNOSIS — I10 PRIMARY HYPERTENSION: ICD-10-CM

## 2024-04-01 LAB
ANION GAP SERPL CALCULATED.3IONS-SCNC: 9 MMOL/L (ref 7–15)
BUN SERPL-MCNC: 12.1 MG/DL (ref 6–20)
CALCIUM SERPL-MCNC: 9.1 MG/DL (ref 8.6–10)
CHLORIDE SERPL-SCNC: 106 MMOL/L (ref 98–107)
CREAT SERPL-MCNC: 0.82 MG/DL (ref 0.51–0.95)
DEPRECATED HCO3 PLAS-SCNC: 27 MMOL/L (ref 22–29)
EGFRCR SERPLBLD CKD-EPI 2021: 85 ML/MIN/1.73M2
GLUCOSE SERPL-MCNC: 102 MG/DL (ref 70–99)
POTASSIUM SERPL-SCNC: 4.1 MMOL/L (ref 3.4–5.3)
SODIUM SERPL-SCNC: 142 MMOL/L (ref 135–145)

## 2024-04-01 PROCEDURE — 90471 IMMUNIZATION ADMIN: CPT | Performed by: NURSE PRACTITIONER

## 2024-04-01 PROCEDURE — 17110 DESTRUCTION B9 LES UP TO 14: CPT | Performed by: NURSE PRACTITIONER

## 2024-04-01 PROCEDURE — 90750 HZV VACC RECOMBINANT IM: CPT | Performed by: NURSE PRACTITIONER

## 2024-04-01 PROCEDURE — 36415 COLL VENOUS BLD VENIPUNCTURE: CPT | Performed by: NURSE PRACTITIONER

## 2024-04-01 PROCEDURE — 99213 OFFICE O/P EST LOW 20 MIN: CPT | Mod: 25 | Performed by: NURSE PRACTITIONER

## 2024-04-01 PROCEDURE — 80048 BASIC METABOLIC PNL TOTAL CA: CPT | Performed by: NURSE PRACTITIONER

## 2024-04-01 NOTE — PROGRESS NOTES
"  Assessment & Plan     Plantar warts  Plantar warts on  All lesions are frozen with LN2 x3. Patient tolerated procedure well.     ASSESSMENT:  WART    PLAN:  WART CARE DISCUSSED. USE OF OTC PRODUCT STARTING IN FEW DAYS. GENTLE ABRAISION WITH PUMICE STONE OR EMERY BOARD WITH GOOD HANDWASHING AFTER. RETURN IN TWO WEEKS FOR REFREEZING UNTIL RESOLVED.     Primary hypertension  Routine labs following initiation on new lisinopril.  - Basic metabolic panel  (Ca, Cl, CO2, Creat, Gluc, K, Na, BUN); Future  - Basic metabolic panel  (Ca, Cl, CO2, Creat, Gluc, K, Na, BUN)    BMI  Estimated body mass index is 30.77 kg/m  as calculated from the following:    Height as of this encounter: 5' 2.3\" (1.582 m).    Weight as of this encounter: 169 lb 14.4 oz (77.1 kg).   Weight management plan: Discussed healthy diet and exercise guidelines    Subjective   Yana is a 53 year old, presenting for the following health issues:  Wart        4/1/2024     8:42 AM   Additional Questions   Roomed by JAC BATES   Accompanied by self     History of Present Illness       Reason for visit:  Warts on feet  Symptom onset:  More than a month    She eats 4 or more servings of fruits and vegetables daily.She consumes 1 sweetened beverage(s) daily.She exercises with enough effort to increase her heart rate 20 to 29 minutes per day.  She exercises with enough effort to increase her heart rate 4 days per week.   She is taking medications regularly.    Yana is a 54yo woman with PMH HTN and anxiety who presents today with plantar warts. She has several on her right foot, one on her left, and a spot on her right thumb as well.    She denies side effects from her lisinopril. States that she has a follow-up appointment scheduled for May.    Review of Systems  Constitutional, HEENT, cardiovascular, pulmonary, gi and gu systems are negative, except as otherwise noted.      Objective    /84   Pulse 69   Temp 98  F (36.7  C) (Oral)   Resp 18   Ht 5' 2.3\" " (1.582 m)   Wt 169 lb 14.4 oz (77.1 kg)   LMP  (LMP Unknown)   BMI 30.77 kg/m    Body mass index is 30.77 kg/m .  Physical Exam  Constitutional:       Appearance: Normal appearance.   Skin:     Comments: Plantar warts noted to base of right foot, between left fourth and fifth toe, and other viral wart on her right thumb.   Neurological:      General: No focal deficit present.      Mental Status: She is alert and oriented to person, place, and time.   Psychiatric:         Mood and Affect: Mood normal.         Behavior: Behavior normal.            Alanna Bains, MARICRUZN, RN, [facility] DNP Student  Signed Electronically by: ZOYA MARKS CNP

## 2024-04-04 ENCOUNTER — OFFICE VISIT (OUTPATIENT)
Dept: OBGYN | Facility: CLINIC | Age: 54
End: 2024-04-04
Payer: COMMERCIAL

## 2024-04-04 VITALS
WEIGHT: 169.1 LBS | TEMPERATURE: 97.9 F | HEART RATE: 101 BPM | DIASTOLIC BLOOD PRESSURE: 97 MMHG | SYSTOLIC BLOOD PRESSURE: 133 MMHG | BODY MASS INDEX: 30.63 KG/M2

## 2024-04-04 DIAGNOSIS — N95.0 POSTMENOPAUSAL BLEEDING: Primary | ICD-10-CM

## 2024-04-04 PROCEDURE — 99203 OFFICE O/P NEW LOW 30 MIN: CPT | Mod: 25 | Performed by: OBSTETRICS & GYNECOLOGY

## 2024-04-04 PROCEDURE — 58100 BIOPSY OF UTERUS LINING: CPT | Performed by: OBSTETRICS & GYNECOLOGY

## 2024-04-04 PROCEDURE — 88305 TISSUE EXAM BY PATHOLOGIST: CPT | Performed by: PATHOLOGY

## 2024-04-04 ASSESSMENT — ANXIETY QUESTIONNAIRES
GAD7 TOTAL SCORE: 16
7. FEELING AFRAID AS IF SOMETHING AWFUL MIGHT HAPPEN: NEARLY EVERY DAY
5. BEING SO RESTLESS THAT IT IS HARD TO SIT STILL: NOT AT ALL
3. WORRYING TOO MUCH ABOUT DIFFERENT THINGS: NEARLY EVERY DAY
IF YOU CHECKED OFF ANY PROBLEMS ON THIS QUESTIONNAIRE, HOW DIFFICULT HAVE THESE PROBLEMS MADE IT FOR YOU TO DO YOUR WORK, TAKE CARE OF THINGS AT HOME, OR GET ALONG WITH OTHER PEOPLE: SOMEWHAT DIFFICULT
1. FEELING NERVOUS, ANXIOUS, OR ON EDGE: NEARLY EVERY DAY
6. BECOMING EASILY ANNOYED OR IRRITABLE: SEVERAL DAYS
2. NOT BEING ABLE TO STOP OR CONTROL WORRYING: NEARLY EVERY DAY
GAD7 TOTAL SCORE: 16

## 2024-04-04 ASSESSMENT — PATIENT HEALTH QUESTIONNAIRE - PHQ9
SUM OF ALL RESPONSES TO PHQ QUESTIONS 1-9: 11
5. POOR APPETITE OR OVEREATING: NEARLY EVERY DAY

## 2024-04-04 NOTE — PROGRESS NOTES
GYN Clinic Note  Date: 2024    CC:  Postmenopausal Bleeding    HPI:  Adela Stone is a 53 year old female  presents for evaluation of postmenopausal bleeding as a referral from [facility]. She has been postmenopausal for 3 years    Yana reports her last normal period was 2021.  Had two seemingly normal cycles this year, and may have any some intermittently since 2021.  Also had accompanying breast tenderness and reports bleeding lasted about 7 days, which was just like a normal period for her.    Mom was recently diagnosed with enodmetrial cancer.  Had hysterectomy and radiation.  Found due to having blood in urine that was worked up.  Hers was a more aggressive type of cancer, but not sure exactly what it was called..      Per note from PCP regarding this concern:    Patient reports going through menopause with 12 months of no menstrual cycle. In the last few months, she has had two episodes of menstrual bleeding accompanied by other symptoms such as breast tenderness. There were no preceding factors like trauma.      Her work-up thus far for her abnormal bleeding has included:  - transvaginal ultrasound: Endometrium 3mm.    GYN HISTORY:  No LMP recorded (lmp unknown). Patient is postmenopausal.    Menopause: 50  Last Pap: 2023, NIL and HPV neg    Patient has following risk factors for endometrial cancer:  Unopposed estrogen exposure: No  Obesity: Yes. Details: Body mass index is 30.63 kg/m .   Smoking: No  Nulliparity: No  Infertility: No  Early age of menarche / late age of menopause: No  Family history of colon cancer, ovarian cancer, and type I endometrial cancer: Yes. Details: endometrial cancer in mom    OBSTETRIC HISTORY:   OB History    Para Term  AB Living   4 3 3 0 0 3   SAB IAB Ectopic Multiple Live Births   0 0 0 0 3      # Outcome Date GA Lbr Santy/2nd Weight Sex Delivery Anes PTL Lv   4             3 Term         LEXI   2 Term         LEXI   1 Term         LEXI          Past Medical History:   Diagnosis Date    Anxiety        Past Surgical History:   Procedure Laterality Date    UT DENTAL SURGERY PROCEDURE      Description: Oral Surgery Tooth Extraction;  Recorded: 08/07/2013;       Family History   Problem Relation Age of Onset    Hypertension Mother     Endometrial Cancer Mother     Diabetes Type 2  Father     Hypertension Father     Diabetes Father     Hypertension Brother     Hypertension Brother     Lung Cancer Maternal Grandmother     Hypertension Maternal Grandmother     Hypertension Maternal Grandfather     Cancer Paternal Grandmother     Cancer Paternal Grandfather     Breast Cancer No family hx of     Ovarian Cancer No family hx of     Colon Cancer No family hx of        Current Outpatient Medications   Medication Sig Dispense Refill    desvenlafaxine (PRISTIQ) 50 MG 24 hr tablet TAKE 1 TABLET BY MOUTH EVERY DAY 30 tablet 1    lisinopril (ZESTRIL) 10 MG tablet Take 1 tablet (10 mg) by mouth daily 30 tablet 1    LORazepam (ATIVAN) 0.5 MG tablet Take one daily as needed for severe anxiety 30 tablet 0    busPIRone (BUSPAR) 10 MG tablet Take 1 tablet (10 mg) twice a day (Patient not taking: Reported on 3/14/2024) 180 tablet 0       Allergies: Patient has no known allergies.    ROS:  C: NEGATIVE for fever, chills, change in weight  I: NEGATIVE for worrisome rashes, moles or lesions  E: NEGATIVE for vision changes or irritation  E/M: NEGATIVE for ear, mouth and throat problems  R: NEGATIVE for significant cough or SOB  CV: NEGATIVE for chest pain, palpitations or peripheral edema  GI: NEGATIVE for nausea, abdominal pain, heartburn, or change in bowel habits  : NEGATIVE for frequency, dysuria, hematuria, vaginal discharge, or irregular bleeding  M: NEGATIVE for significant arthralgias or myalgia  N: NEGATIVE for weakness, dizziness or paresthesias  E: NEGATIVE for temperature intolerance, skin/hair changes  P: NEGATIVE for changes in mood or affect    EXAM:  Blood  pressure (!) 133/97, pulse 101, temperature 97.9  F (36.6  C), temperature source Oral, weight 76.7 kg (169 lb 1.6 oz).   BMI= Body mass index is 30.63 kg/m .  General - pleasant female in no acute distress.  Abdomen - soft, nontender, nondistended, no hepatosplenomegaly.  Pelvic - external genitalia: normal adult female without lesions or abnormalities; BUS: within normal limits; Vagina: well rugated, no discharge, no lesions; Cervix: no lesions or CMT  Rectovaginal - deferred.  Musculoskeletal - no gross deformities.  Neurological - normal strength, sensation, and mental status.    Procedure: Endometrial biopsy.   After informed consent was obtained, the cervix was cleansed with betadyne, grasped with a tenaculum.  The pipel was inserted easily and four quadrant sampling was done x 2.  Sample was sent for pathology.    Tenaculum removed, hemostasis achieved with simple pressure, minimal bleeding. Patient did have vasovagal event following and was a bit slow to recover, but did leave the clinic without complication after rest, ice pack, cold juice and crackersr.      EXAM: US PELVIC TRANSABDOMINAL AND TRANSVAGINAL  LOCATION: [facility]  DATE: 3/15/2024     INDICATION:  Postmenopausal bleeding  COMPARISON: Pelvic ultrasound 2011  TECHNIQUE: Transabdominal scans were performed. Endovaginal ultrasound was performed to better visualize the adnexa.     FINDINGS:     UTERUS: 6.5 x 3.9 x 3.3 cm. Normal in size and position with no masses.     ENDOMETRIUM: 3 mm. Normal smooth endometrium.     RIGHT OVARY: 2.3 x 2.0 x 1.1 cm. Normal.     LEFT OVARY: 2.1 x 1.3 x 1.3 cm. Normal.     No significant free fluid.                                                                      IMPRESSION:  Unremarkable sonographic appearance of the uterus and ovaries. No endometrial thickening.     ASSESSMENT:  Adela Stone is a 53 year old  who presents with postmenopausal bleeding.     PLAN:  1.  Postmenopausal bleeding  Reviewed potential causes of post-menopausal bleeding.  Reviewed that it is acceptable to observe at this point given endometrium measuring 3mm.  However, given her mom's recent diagnosis, I would favor doing endometrial sampling. Discussed office EMB vs hysteroscopy.  She would like to proceed with office EMB, which was performed without complication, other than vasovagal event.    - Ob/Gyn  Referral  - Surgical Pathology Exam  - ENDOMETRIAL BIOPSY W/O CERVICAL DILATION      Monalisa Waldron MD

## 2024-04-10 LAB
PATH REPORT.COMMENTS IMP SPEC: NORMAL
PATH REPORT.COMMENTS IMP SPEC: NORMAL
PATH REPORT.FINAL DX SPEC: NORMAL
PATH REPORT.GROSS SPEC: NORMAL
PATH REPORT.MICROSCOPIC SPEC OTHER STN: NORMAL
PATH REPORT.RELEVANT HX SPEC: NORMAL
PHOTO IMAGE: NORMAL

## 2024-04-15 ENCOUNTER — VIRTUAL VISIT (OUTPATIENT)
Dept: PSYCHIATRY | Facility: CLINIC | Age: 54
End: 2024-04-15
Payer: COMMERCIAL

## 2024-04-15 DIAGNOSIS — F40.10 SOCIAL ANXIETY DISORDER: Primary | ICD-10-CM

## 2024-04-15 DIAGNOSIS — F41.1 GENERALIZED ANXIETY DISORDER: ICD-10-CM

## 2024-04-15 PROCEDURE — 99214 OFFICE O/P EST MOD 30 MIN: CPT | Mod: 95 | Performed by: PSYCHIATRY & NEUROLOGY

## 2024-04-15 RX ORDER — DESVENLAFAXINE 25 MG/1
25 TABLET, EXTENDED RELEASE ORAL DAILY
Qty: 30 TABLET | Refills: 1 | Status: SHIPPED | OUTPATIENT
Start: 2024-04-15 | End: 2024-05-14

## 2024-04-15 RX ORDER — PROPRANOLOL HYDROCHLORIDE 20 MG/1
TABLET ORAL
Qty: 30 TABLET | Refills: 2 | Status: SHIPPED | OUTPATIENT
Start: 2024-04-15

## 2024-04-15 ASSESSMENT — ANXIETY QUESTIONNAIRES
3. WORRYING TOO MUCH ABOUT DIFFERENT THINGS: NEARLY EVERY DAY
GAD7 TOTAL SCORE: 9
GAD7 TOTAL SCORE: 9
2. NOT BEING ABLE TO STOP OR CONTROL WORRYING: SEVERAL DAYS
1. FEELING NERVOUS, ANXIOUS, OR ON EDGE: NEARLY EVERY DAY
8. IF YOU CHECKED OFF ANY PROBLEMS, HOW DIFFICULT HAVE THESE MADE IT FOR YOU TO DO YOUR WORK, TAKE CARE OF THINGS AT HOME, OR GET ALONG WITH OTHER PEOPLE?: SOMEWHAT DIFFICULT
7. FEELING AFRAID AS IF SOMETHING AWFUL MIGHT HAPPEN: SEVERAL DAYS
5. BEING SO RESTLESS THAT IT IS HARD TO SIT STILL: NOT AT ALL
6. BECOMING EASILY ANNOYED OR IRRITABLE: NOT AT ALL
7. FEELING AFRAID AS IF SOMETHING AWFUL MIGHT HAPPEN: SEVERAL DAYS
4. TROUBLE RELAXING: SEVERAL DAYS
GAD7 TOTAL SCORE: 9
IF YOU CHECKED OFF ANY PROBLEMS ON THIS QUESTIONNAIRE, HOW DIFFICULT HAVE THESE PROBLEMS MADE IT FOR YOU TO DO YOUR WORK, TAKE CARE OF THINGS AT HOME, OR GET ALONG WITH OTHER PEOPLE: SOMEWHAT DIFFICULT

## 2024-04-15 NOTE — PROGRESS NOTES
Virtual Visit Details    Type of service:  Video Visit     Originating Location (pt. Location): Home    Distant Location (provider location):  On-site  Platform used for Video Visit: [facility] Psychiatry Consult Note    IDENTIFICATION   Name: Adela Stone   : 1970/53 year old      Sex:    @ female          Telemedicine Visit: The patient's condition can be safely assessed and treated via synchronous audio and visual telemedicine encounter.        Face to Face/patient Contact total time: 19 minutes  Pre Charting time: 1 minutes; Post charting time, communication and other activities: 1 minutes; Total time 21 minutes  10:14 AM -10:33 AM          SUBJECTIVE   Doing okay. Never started buspirone - was too afraid to another medication. Preference was to slowly ween off and then try a different medication. Takes the edge off of general and social anxiety. Better at recognizing when she goes into panic. Underlying fear of social interaction that is pervasive even with family. Saves lorazepam for emergencies. Just refilled lorazepam. The last 30 pills had for at least 6 months.     Social anxiety physical effects - increased heart rate, tension, going numb. Has to consciously release. Notices tension quite often.       The following assessments were completed by patient for this visit:  PROMIS 10-Global Health (only subscores and total score):       2023     8:11 AM 4/15/2024     9:55 AM   PROMIS-10 Scores Only   Global Mental Health Score 7 8   Global Physical Health Score 16 16   PROMIS TOTAL - SUBSCORES 23 24             2023     7:28 AM 3/14/2024     3:40 PM 2024    10:02 AM   PHQ   PHQ-9 Total Score 4    4 10 11   Q9: Thoughts of better off dead/self-harm past 2 weeks Not at all Not at all Not at all          2023     7:30 AM 2024    10:02 AM 4/15/2024     9:52 AM   PARIS-7 SCORE   Total Score 14 (moderate anxiety)  9 (mild anxiety)   Total Score 14    14 16 9         OBJECTIVE     Vital Signs:   LMP  (LMP Unknown)     Labs:  na    Current Medications:  Current Outpatient Medications   Medication Sig Dispense Refill    desvenlafaxine (PRISTIQ) 50 MG 24 hr tablet TAKE 1 TABLET BY MOUTH EVERY DAY 30 tablet 1    lisinopril (ZESTRIL) 10 MG tablet Take 1 tablet (10 mg) by mouth daily 30 tablet 1    LORazepam (ATIVAN) 0.5 MG tablet Take one daily as needed for severe anxiety 30 tablet 0    busPIRone (BUSPAR) 10 MG tablet Take 1 tablet (10 mg) twice a day 180 tablet 0     No current facility-administered medications for this visit.        The [place] Prescription Monitoring Program has been reviewed and there are no concerns about diversionary activity for controlled substances at this time.        ADDED HISTORY   Social anxiety has led to avoidance of events. Backs out of plans. Feels out of place, not belonging, being judged at the base of it. Tried therapy but hard to stick it out. Hx exercise induced asthma that is infrequent. Never had an asthma attack.     MENTAL STATUS EXAMINATION:   Attitude: Cooperative  Oriented to: Grossly person place and time  Attention Span and Concentration: Good  Speech: Within normal limits  Language: No abnormalities  Mood:  Fair  Affect: Constricted  Associations:  no loose associations  Thought Process:  logical, linear and goal oriented  Thought Content: No evidence of delusions or suicidal or homicidal ideation plan or intent  Memory: Grossly intact  Fund of Knowledge: Good  Insight:  good  Judgment:  intact, adequate for safety  Impulse Control:  intact        DIAGNOSES:   Generalized anxiety disorder, with panic attacks  Social Anxiety Disorder      ASSESSMENT:   Patient dealing with generalized anxiety symptoms, history of panic.  Symptoms are in partial remission with Pristiq.  Generally medication management has had efficacy, does prefer a holistic approach to treatment.  Discussed risks and benefits of medication management versus general  holistic treatment.  Referred for integrated medicine.  Add buspirone as potential replacement for Pristiq and bridge to holistic intervention if an effective treatment is found.    Today Adela Stone reports no suicidal ideations. In addition, she has notable risk factors for self-harm, including anxiety. However, risk is mitigated by commitment to family and absence of past attempts. Therefore, based on all available evidence including the factors cited above, she does not appear to be at imminent risk for self-harm, does not meet criteria for a 72-hr hold, and therefore remains appropriate for ongoing outpatient level of care.       PLAN:     Patient advised of consultative model. Patient will continue to be seen for ongoing consultation and stabilization.  Does not meet criteria for involuntary treatment or hospitalization  Continue Pristiq 50 mg daily => 4/15/24 25 mg daily-Risks, benefits and alternatives discussed.  Patient provides verbal consent to treatment.  Long-term considerations discussed.  Plan to taper if buspirone is successful  Preference is to stop desvenlafaxine entirely before starting new trial  HOLD buspirone 11/21/2023 5 mg p.o. twice daily x 5 days then 10 mg p.o. twice daily-Risks, benefits and alternatives discussed.  Patient provides verbal consent to treatment.  Propranolol 4/15/24 20 mg daily as needed anxiety, take 30 to 60 minutes before an anxiety provoking event  Lorazepam 0.5 mg daily prn panic -Risks, benefits and alternatives discussed.  Patient provides verbal consent to treatment.  Provided referral/resource in for integrative medicine; referred for acupuncture  Return in 4-8 weeks    Administrative Billing:   Time spent with patient was greater than 50% of time and/or significant time was spent in counseling and coordination of care regarding above diagnoses and treatment plan. Pre charting time and post charting time/documentation/coordination are done on date of service.       Signed:   Vikas Whittington M.D.  [facility] Psychiatry Service    Disclaimer: This note consists of symbols derived from keyboarding, dictation and/or voice recognition software. As a result, there may be errors in the script that have gone undetected. Please consider this when interpreting information found in this chart.    eoc

## 2024-04-15 NOTE — NURSING NOTE
Is the patient currently in the state of [place]? YES    Visit mode:VIDEO    If the visit is dropped, the patient can be reconnected by: VIDEO VISIT: Text to cell phone:   Telephone Information:   Mobile [phone]       Will anyone else be joining the visit? No  (If patient encounters technical issues they should call [phone])    How would you like to obtain your AVS? MyChart    Are changes needed to the allergy or medication list? Yes Pt would like to discuss with provider to reduce medication dosage for desvenlafaxine (PRISTIQ) 50 MG. Pt reported to have never taken the medication: busPIRone (BUSPAR) 10 MG tablet; and Pt stated no changes to allergies    Are refills needed on medications prescribed by this physician? NO    Rooming Documentation: Assigned questionnaire(s) completed .    Reason for visit: RECHCHARLOTTE Gurrola

## 2024-04-25 ENCOUNTER — OFFICE VISIT (OUTPATIENT)
Dept: INTERNAL MEDICINE | Facility: CLINIC | Age: 54
End: 2024-04-25
Payer: COMMERCIAL

## 2024-04-25 VITALS
RESPIRATION RATE: 16 BRPM | HEART RATE: 96 BPM | WEIGHT: 168 LBS | DIASTOLIC BLOOD PRESSURE: 78 MMHG | HEIGHT: 62 IN | SYSTOLIC BLOOD PRESSURE: 116 MMHG | BODY MASS INDEX: 30.91 KG/M2 | OXYGEN SATURATION: 96 % | TEMPERATURE: 97.2 F

## 2024-04-25 DIAGNOSIS — B07.9 VIRAL WART ON FINGER: ICD-10-CM

## 2024-04-25 DIAGNOSIS — B07.0 PLANTAR WARTS: Primary | ICD-10-CM

## 2024-04-25 PROCEDURE — 99213 OFFICE O/P EST LOW 20 MIN: CPT | Performed by: INTERNAL MEDICINE

## 2024-04-25 NOTE — PROGRESS NOTES
"  Assessment & Plan     Plantar warts      Viral wart on finger    Yana is a 52yo woman with PMH HTN and anxiety who presents today with plantar warts. She has several on her right foot,  and a spot on her right thumb as well.     She had all warts treated with liquid nitrogen on 4/1/24.      All lesions are frozen with LN2 x3. Patient tolerated procedure well.       WART CARE DISCUSSED. USE OF OTC PRODUCT STARTING IN FEW DAYS. GENTLE ABRAISION WITH PUMICE STONE OR EMERY BOARD WITH GOOD HANDWASHING AFTER. RETURN IN TWO WEEKS FOR REFREEZING UNTIL RESOLVED.                     Subjective   Yana is a 53 year old, presenting for the following health issues:  Wart Evaluation   (Wart Evaluation Of Removal Couple Week's Ago, Does She Need Another Treatment? )      4/25/2024     3:15 PM   Additional Questions   Roomed by Cristel     History of Present Illness       Reason for visit:  Followup on plantar wart treatment    She eats 4 or more servings of fruits and vegetables daily.She consumes 1 sweetened beverage(s) daily.She exercises with enough effort to increase her heart rate 20 to 29 minutes per day.  She exercises with enough effort to increase her heart rate 3 or less days per week.   She is taking medications regularly.                     Objective    /78   Pulse 96   Temp 97.2  F (36.2  C)   Resp 16   Ht 1.582 m (5' 2.3\")   Wt 76.2 kg (168 lb)   LMP  (LMP Unknown)   SpO2 96%   BMI 30.43 kg/m    Body mass index is 30.43 kg/m .  Physical Exam               Signed Electronically by: Noemi Ocampo MD    "

## 2024-04-29 NOTE — TELEPHONE ENCOUNTER
[facility] received faxed refill request for busPIRone (BUSPAR) 10 MG tablet (Discontinued)      Dawn Aggarwal CMA April 29, 2024

## 2024-05-09 DIAGNOSIS — I10 PRIMARY HYPERTENSION: ICD-10-CM

## 2024-05-10 RX ORDER — LISINOPRIL 10 MG/1
10 TABLET ORAL DAILY
Qty: 30 TABLET | Refills: 1 | Status: SHIPPED | OUTPATIENT
Start: 2024-05-10 | End: 2024-05-14

## 2024-05-24 ENCOUNTER — OFFICE VISIT (OUTPATIENT)
Dept: FAMILY MEDICINE | Facility: CLINIC | Age: 54
End: 2024-05-24
Payer: COMMERCIAL

## 2024-05-24 ENCOUNTER — ORDERS ONLY (AUTO-RELEASED) (OUTPATIENT)
Dept: FAMILY MEDICINE | Facility: CLINIC | Age: 54
End: 2024-05-24
Payer: COMMERCIAL

## 2024-05-24 VITALS
BODY MASS INDEX: 30.66 KG/M2 | TEMPERATURE: 98.3 F | RESPIRATION RATE: 14 BRPM | SYSTOLIC BLOOD PRESSURE: 123 MMHG | DIASTOLIC BLOOD PRESSURE: 86 MMHG | OXYGEN SATURATION: 98 % | WEIGHT: 166.6 LBS | HEART RATE: 110 BPM | HEIGHT: 62 IN

## 2024-05-24 DIAGNOSIS — R00.2 PALPITATIONS: ICD-10-CM

## 2024-05-24 DIAGNOSIS — F41.1 GENERALIZED ANXIETY DISORDER: Primary | ICD-10-CM

## 2024-05-24 DIAGNOSIS — I10 PRIMARY HYPERTENSION: ICD-10-CM

## 2024-05-24 DIAGNOSIS — E66.811 CLASS 1 OBESITY WITH SERIOUS COMORBIDITY AND BODY MASS INDEX (BMI) OF 30.0 TO 30.9 IN ADULT, UNSPECIFIED OBESITY TYPE: ICD-10-CM

## 2024-05-24 DIAGNOSIS — R29.818 SUSPECTED SLEEP APNEA: ICD-10-CM

## 2024-05-24 DIAGNOSIS — F40.10 SOCIAL ANXIETY DISORDER: ICD-10-CM

## 2024-05-24 PROBLEM — E55.9 VITAMIN D DEFICIENCY: Status: ACTIVE | Noted: 2024-05-24

## 2024-05-24 PROBLEM — L71.9 ROSACEA: Status: ACTIVE | Noted: 2024-05-24

## 2024-05-24 PROCEDURE — 36415 COLL VENOUS BLD VENIPUNCTURE: CPT | Performed by: FAMILY MEDICINE

## 2024-05-24 PROCEDURE — 99214 OFFICE O/P EST MOD 30 MIN: CPT | Performed by: FAMILY MEDICINE

## 2024-05-24 PROCEDURE — 84443 ASSAY THYROID STIM HORMONE: CPT | Performed by: FAMILY MEDICINE

## 2024-05-24 RX ORDER — LISINOPRIL 10 MG/1
10 TABLET ORAL DAILY
Qty: 90 TABLET | Refills: 1 | Status: SHIPPED | OUTPATIENT
Start: 2024-05-24 | End: 2024-09-17

## 2024-05-24 ASSESSMENT — ANXIETY QUESTIONNAIRES
5. BEING SO RESTLESS THAT IT IS HARD TO SIT STILL: SEVERAL DAYS
GAD7 TOTAL SCORE: 16
8. IF YOU CHECKED OFF ANY PROBLEMS, HOW DIFFICULT HAVE THESE MADE IT FOR YOU TO DO YOUR WORK, TAKE CARE OF THINGS AT HOME, OR GET ALONG WITH OTHER PEOPLE?: SOMEWHAT DIFFICULT
GAD7 TOTAL SCORE: 16
4. TROUBLE RELAXING: MORE THAN HALF THE DAYS
7. FEELING AFRAID AS IF SOMETHING AWFUL MIGHT HAPPEN: NEARLY EVERY DAY
GAD7 TOTAL SCORE: 16
6. BECOMING EASILY ANNOYED OR IRRITABLE: SEVERAL DAYS
1. FEELING NERVOUS, ANXIOUS, OR ON EDGE: NEARLY EVERY DAY
3. WORRYING TOO MUCH ABOUT DIFFERENT THINGS: NEARLY EVERY DAY
IF YOU CHECKED OFF ANY PROBLEMS ON THIS QUESTIONNAIRE, HOW DIFFICULT HAVE THESE PROBLEMS MADE IT FOR YOU TO DO YOUR WORK, TAKE CARE OF THINGS AT HOME, OR GET ALONG WITH OTHER PEOPLE: SOMEWHAT DIFFICULT
7. FEELING AFRAID AS IF SOMETHING AWFUL MIGHT HAPPEN: NEARLY EVERY DAY
2. NOT BEING ABLE TO STOP OR CONTROL WORRYING: NEARLY EVERY DAY

## 2024-05-24 ASSESSMENT — PATIENT HEALTH QUESTIONNAIRE - PHQ9
SUM OF ALL RESPONSES TO PHQ QUESTIONS 1-9: 13
SUM OF ALL RESPONSES TO PHQ QUESTIONS 1-9: 13
10. IF YOU CHECKED OFF ANY PROBLEMS, HOW DIFFICULT HAVE THESE PROBLEMS MADE IT FOR YOU TO DO YOUR WORK, TAKE CARE OF THINGS AT HOME, OR GET ALONG WITH OTHER PEOPLE: SOMEWHAT DIFFICULT

## 2024-05-24 NOTE — ASSESSMENT & PLAN NOTE
Obesity evidenced by bmi 30.18 and chronic comorbid condition of hypertension. She walks on treadmill. Physician assisted weight loss offered.

## 2024-05-24 NOTE — ASSESSMENT & PLAN NOTE
Social anxiety disorder and generalized anxiety disorder.   Managed by Dr. Whittington.   Continue pristiq, lorazepam and propranolol.   Counseling offered, declined.   Follow up with Dr. Whittington.

## 2024-05-24 NOTE — ASSESSMENT & PLAN NOTE
Palpitations, notes a sensation of heart skipping beats once a week and sometimes it can be more than once a day. This is not new she has has this for years, no associated symptoms.

## 2024-05-24 NOTE — ASSESSMENT & PLAN NOTE
>>ASSESSMENT AND PLAN FOR SOCIAL ANXIETY DISORDER WRITTEN ON 5/24/2024  3:33 PM BY JAYCE MATHEWS MD    Social anxiety disorder and generalized anxiety disorder.   Managed by Dr. Whittington.   Continue pristiq, lorazepam and propranolol.   Counseling offered, declined.   Follow up with Dr. Whittington.    >>ASSESSMENT AND PLAN FOR GENERALIZED ANXIETY DISORDER WRITTEN ON 5/24/2024  3:33 PM BY JAYCE MATHEWS MD    Social anxiety disorder and generalized anxiety disorder.   Managed by Dr. Whittington.   Continue pristiq, lorazepam and propranolol.   Counseling offered, declined.   Follow up with Dr. Whittington.

## 2024-05-24 NOTE — ASSESSMENT & PLAN NOTE
Suspected sleep apnea, sleep med consult offered. She says she has very loud snoring. She also says she feels daytime sleepiness.

## 2024-05-25 LAB — TSH SERPL DL<=0.005 MIU/L-ACNC: 1.77 UIU/ML (ref 0.3–4.2)

## 2024-06-10 ENCOUNTER — VIRTUAL VISIT (OUTPATIENT)
Dept: PSYCHIATRY | Facility: CLINIC | Age: 54
End: 2024-06-10
Payer: COMMERCIAL

## 2024-06-10 ENCOUNTER — VIRTUAL VISIT (OUTPATIENT)
Dept: BEHAVIORAL HEALTH | Facility: CLINIC | Age: 54
End: 2024-06-10
Payer: COMMERCIAL

## 2024-06-10 VITALS — BODY MASS INDEX: 29.44 KG/M2 | WEIGHT: 160 LBS | HEIGHT: 62 IN

## 2024-06-10 DIAGNOSIS — F41.1 GENERALIZED ANXIETY DISORDER: Primary | ICD-10-CM

## 2024-06-10 DIAGNOSIS — F41.1 GENERALIZED ANXIETY DISORDER: ICD-10-CM

## 2024-06-10 PROCEDURE — 99214 OFFICE O/P EST MOD 30 MIN: CPT | Mod: 95 | Performed by: PSYCHIATRY & NEUROLOGY

## 2024-06-10 PROCEDURE — 90832 PSYTX W PT 30 MINUTES: CPT | Mod: 95 | Performed by: COUNSELOR

## 2024-06-10 RX ORDER — DESVENLAFAXINE 25 MG/1
25 TABLET, EXTENDED RELEASE ORAL DAILY
Qty: 90 TABLET | Refills: 0 | Status: SHIPPED | OUTPATIENT
Start: 2024-06-10 | End: 2024-08-22

## 2024-06-10 ASSESSMENT — ANXIETY QUESTIONNAIRES
6. BECOMING EASILY ANNOYED OR IRRITABLE: NOT AT ALL
2. NOT BEING ABLE TO STOP OR CONTROL WORRYING: SEVERAL DAYS
5. BEING SO RESTLESS THAT IT IS HARD TO SIT STILL: SEVERAL DAYS
8. IF YOU CHECKED OFF ANY PROBLEMS, HOW DIFFICULT HAVE THESE MADE IT FOR YOU TO DO YOUR WORK, TAKE CARE OF THINGS AT HOME, OR GET ALONG WITH OTHER PEOPLE?: SOMEWHAT DIFFICULT
4. TROUBLE RELAXING: SEVERAL DAYS
3. WORRYING TOO MUCH ABOUT DIFFERENT THINGS: NEARLY EVERY DAY
6. BECOMING EASILY ANNOYED OR IRRITABLE: NOT AT ALL
1. FEELING NERVOUS, ANXIOUS, OR ON EDGE: NEARLY EVERY DAY
3. WORRYING TOO MUCH ABOUT DIFFERENT THINGS: NEARLY EVERY DAY
2. NOT BEING ABLE TO STOP OR CONTROL WORRYING: SEVERAL DAYS
GAD7 TOTAL SCORE: 10
7. FEELING AFRAID AS IF SOMETHING AWFUL MIGHT HAPPEN: SEVERAL DAYS
7. FEELING AFRAID AS IF SOMETHING AWFUL MIGHT HAPPEN: SEVERAL DAYS
1. FEELING NERVOUS, ANXIOUS, OR ON EDGE: NEARLY EVERY DAY
GAD7 TOTAL SCORE: 10
4. TROUBLE RELAXING: SEVERAL DAYS
GAD7 TOTAL SCORE: 10
5. BEING SO RESTLESS THAT IT IS HARD TO SIT STILL: SEVERAL DAYS
8. IF YOU CHECKED OFF ANY PROBLEMS, HOW DIFFICULT HAVE THESE MADE IT FOR YOU TO DO YOUR WORK, TAKE CARE OF THINGS AT HOME, OR GET ALONG WITH OTHER PEOPLE?: SOMEWHAT DIFFICULT
GAD7 TOTAL SCORE: 10
GAD7 TOTAL SCORE: 10
7. FEELING AFRAID AS IF SOMETHING AWFUL MIGHT HAPPEN: SEVERAL DAYS
IF YOU CHECKED OFF ANY PROBLEMS ON THIS QUESTIONNAIRE, HOW DIFFICULT HAVE THESE PROBLEMS MADE IT FOR YOU TO DO YOUR WORK, TAKE CARE OF THINGS AT HOME, OR GET ALONG WITH OTHER PEOPLE: SOMEWHAT DIFFICULT
IF YOU CHECKED OFF ANY PROBLEMS ON THIS QUESTIONNAIRE, HOW DIFFICULT HAVE THESE PROBLEMS MADE IT FOR YOU TO DO YOUR WORK, TAKE CARE OF THINGS AT HOME, OR GET ALONG WITH OTHER PEOPLE: SOMEWHAT DIFFICULT
7. FEELING AFRAID AS IF SOMETHING AWFUL MIGHT HAPPEN: SEVERAL DAYS
GAD7 TOTAL SCORE: 10

## 2024-06-10 ASSESSMENT — PAIN SCALES - GENERAL: PAINLEVEL: NO PAIN (0)

## 2024-06-10 NOTE — PROGRESS NOTES
Virtual Visit Details    Type of service:  Video Visit     Originating Location (pt. Location): Home    Distant Location (provider location):  On-site  Platform used for Video Visit: [facility] Psychiatry Consult Note    IDENTIFICATION   Name: Adela Stone   : 1970/54 year old      Sex:    @ female          Telemedicine Visit: The patient's condition can be safely assessed and treated via synchronous audio and visual telemedicine encounter.        Face to Face/patient Contact total time: 20 minutes  Pre Charting time: 2 minutes; Post charting time, communication and other activities: 1 minutes; Total time 23 minutes  10:11 AM -10:31 AM          SUBJECTIVE     History of Present Illness  The patient presents via virtual visit for evaluation of anxiety.    The patient reports a stable condition of her anxiety, attributing it to a reduction in her Pristiq dosage to half a tablet. Initially, she experienced generalized anxiety upon awakening, but this has since stabilized over the past 1 to 2 months. She expresses a desire to stabilize her Pristiq dosage at 25 mg, which she finds beneficial. However, she experiences severe anxiety, necessitating an emergency room visit, which she attributes to stress. She has been utilizing a heart monitor for the past 10 days due to intermittent palpitations, skipping, or fluctuations. She suspects that her physical symptoms are related to her anxiety. She uses lorazepam for panic attacks, which she finds beneficial. She was prescribed propranolol for social anxiety, but she has not yet tried it. Her anxiety is more severe than her depression, which she believes is natural. She expresses a desire to discontinue Pristiq and switch to BuSpar, but she does not believe she is ready at this time. She denies any side effects from Pristiq, but expresses concern about potential side effects. She is seeking alternatives that are not manufactured medications. She first  "tried Paxil 20 to 25 years ago, which worked well for her, but experienced side effects. She is concerned about withdrawal symptoms if she misses a dose or goes too long. She is inquiring about CBD and hypnosis.        The following assessments were completed by patient for this visit:  PROMIS 10-Global Health (only subscores and total score):       11/21/2023     8:11 AM 4/15/2024     9:55 AM   PROMIS-10 Scores Only   Global Mental Health Score 7 8   Global Physical Health Score 16 16   PROMIS TOTAL - SUBSCORES 23 24             3/14/2024     3:40 PM 4/4/2024    10:02 AM 5/24/2024     3:09 PM   PHQ   PHQ-9 Total Score 10 11 13   Q9: Thoughts of better off dead/self-harm past 2 weeks Not at all Not at all Not at all          4/15/2024     9:52 AM 5/24/2024     3:09 PM 6/10/2024     9:32 AM   PARIS-7 SCORE   Total Score 9 (mild anxiety) 16 (severe anxiety) 10 (moderate anxiety)   Total Score 9 16 10    10        OBJECTIVE     Vital Signs:   Ht 1.575 m (5' 2\")   Wt 72.6 kg (160 lb)   LMP  (LMP Unknown)   BMI 29.26 kg/m      Labs:    Results            Current Medications:  Current Outpatient Medications   Medication Sig Dispense Refill    desvenlafaxine succinate (PRISTIQ) 25 MG 24 hr tablet Take 1 tablet (25 mg) by mouth daily 90 tablet 0    lisinopril (ZESTRIL) 10 MG tablet Take 1 tablet (10 mg) by mouth daily 90 tablet 1    lisinopril (ZESTRIL) 10 MG tablet Take 1 tablet (10 mg) by mouth daily 90 tablet 0    LORazepam (ATIVAN) 0.5 MG tablet Take one daily as needed for severe anxiety 30 tablet 0    propranolol (INDERAL) 20 MG tablet Take one tablet twice a day as needed for anxiety. Take 30-60 minutes ahead of an anxiety provoking event. 30 tablet 2     No current facility-administered medications for this visit.            ADDED HISTORY   We will be doing family therapy with daughter with anxiety.    Physical exam    Physical Exam          MENTAL STATUS EXAMINATION:   Attitude: Cooperative  Oriented to: Grossly " person place and time  Attention Span and Concentration: Good  Speech: Within normal limits  Language: No abnormalities  Mood:  Fair  Affect: Constricted  Associations:  no loose associations  Thought Process:  logical, linear and goal oriented  Thought Content: No evidence of delusions or suicidal or homicidal ideation plan or intent  Memory: Grossly intact  Fund of Knowledge: Good  Insight:  good  Judgment:  intact, adequate for safety  Impulse Control:  intact        DIAGNOSES:   Generalized anxiety disorder, with panic attacks  Social Anxiety Disorder      ASSESSMENT:   Patient dealing with generalized anxiety symptoms, history of panic.  Symptoms are in partial remission with Pristiq.  Generally medication management has had efficacy, does prefer a holistic approach to treatment.  Discussed risks and benefits of medication management versus general holistic treatment.  Referred for integrated medicine.  Add buspirone as potential replacement for Pristiq and bridge to holistic intervention if an effective treatment is found.    Today Adela Stone reports no suicidal ideations. In addition, she has notable risk factors for self-harm, including anxiety. However, risk is mitigated by commitment to family and absence of past attempts. Therefore, based on all available evidence including the factors cited above, she does not appear to be at imminent risk for self-harm, does not meet criteria for a 72-hr hold, and therefore remains appropriate for ongoing outpatient level of care.       PLAN:     Patient advised of consultative model. Patient will continue to be seen for ongoing consultation and stabilization.  Does not meet criteria for involuntary treatment or hospitalization  Continue Pristiq 50 mg daily => 4/15/24 25 mg daily efficacy-Risks, benefits and alternatives discussed.  Patient provides verbal consent to treatment.  Long-term considerations discussed.  Plan to taper if buspirone is successful  Preference  is to stop desvenlafaxine entirely before starting new trial  HOLD buspirone 11/21/2023 5 mg p.o. twice daily x 5 days then 10 mg p.o. twice daily-Risks, benefits and alternatives discussed.  Patient provides verbal consent to treatment.  Propranolol 4/15/24 20 mg daily as needed anxiety, take 30 to 60 minutes before an anxiety provoking event -Risks, benefits and alternatives discussed.  Patient provides verbal consent to treatment.  Lorazepam 0.5 mg daily prn panic -Risks, benefits and alternatives discussed.  Patient provides verbal consent to treatment.  Provided referral/resource in for integrative medicine; referred for acupuncture  Her long-term plan is to go for naturalistic treatment, was advised of pros and cons of this approach  Return in 4-8 weeks    Assessment & Plan  1. Anxiety.  A refill of Pristiq has been provided.    Follow-up  A follow-up appointment is scheduled for 2 months from now.      Administrative Billing:   Time spent with patient was greater than 50% of time and/or significant time was spent in counseling and coordination of care regarding above diagnoses and treatment plan. Pre charting time and post charting time/documentation/coordination are done on date of service.      Signed:   Vikas Whittington M.D.  [facility] Psychiatry Service    Disclaimer: This note consists of symbols derived from keyboarding, dictation and/or voice recognition software. As a result, there may be errors in the script that have gone undetected. Please consider this when interpreting information found in this chart.    Consent was obtained from the patient to use an AI documentation tool in the creation of this note.     eoc

## 2024-06-10 NOTE — PROGRESS NOTES
[facility]  Katalina 10, 2024      Behavioral Health Clinician Progress Note    Patient Name: Adela Stone           Service Type:  Individual      Service Location:   MyChart / Email (patient reached)     Session Start Time: 938am  Session End Time: 1007am       Session Length: 16 - 37      Attendees: Patient     Service Modality:  Video Visit:      Provider verified identity through the following two step process.  Patient provided:  Patient is known previously to provider and Patient was verified at admission/transfer    Telemedicine Visit: The patient's condition can be safely assessed and treated via synchronous audio and visual telemedicine encounter.      Reason for Telemedicine Visit: Patient has requested telehealth visit    Originating Site (Patient Location): Patient's home    Distant Site (Provider Location): [facility] MENTAL [facility] & ADDICTION [facility]    Consent:  The patient/guardian has verbally consented to: the potential risks and benefits of telemedicine (video visit) versus in person care; bill my insurance or make self-payment for services provided; and responsibility for payment of non-covered services.     Patient would like the video invitation sent by:  My Chart    Mode of Communication:  Video Conference via [facility]    Distant Location (Provider):  On-site    As the provider I attest to compliance with applicable laws and regulations related to telemedicine.    Visit Activities (Refresh list every visit): [facility] Only    Diagnostic Assessment Date: 11/21/2023  Treatment Plan Review Date: in the next few visits  See Flowsheets for today's PHQ-9 and PARIS-7 results  Previous PHQ-9:       3/14/2024     3:40 PM 4/4/2024    10:02 AM 5/24/2024     3:09 PM   PHQ-9 SCORE   PHQ-9 Total Score MyChart 10 (Moderate depression)  13 (Moderate depression)   PHQ-9 Total Score 10 11 13     Previous PARIS-7:       4/15/2024     9:52 AM 5/24/2024      3:09 PM 6/10/2024     9:32 AM   PARIS-7 SCORE   Total Score 9 (mild anxiety) 16 (severe anxiety) 10 (moderate anxiety)   Total Score 9 16 10    10           DATA  Extended Session (60+ minutes): No  Interactive Complexity: No  Crisis: No  [facility] Patient: No    Treatment Objective(s) Addressed in This Session:  Target Behavior(s): disease management/lifestyle changes manage anxiety    Anxiety: will experience a reduction in anxiety, will develop more effective coping skills to manage anxiety symptoms, and will increase ability to function adaptively    Current Stressors / Issues:  Questions/Thoughts for Dr. Whittington:     Better/worse: anxiety was up and now they are stable  ADLs: sleep- work and kids, it's 1am for pt to get to sleep and some interrupted sleep a couple times a night, she snores loudly , appetite- she has been mindful of nervous eating    Current Symptoms: Pt reports that things are stable. Pristiq was reduced. When she first stepped down she noticed more anxiety. Now she has adjusted to it. There is still tension in her neck and shoulders and release them when she can. PCP and pt are exploring and working to reduce and identify health concerns. Irritability has been less. Having things on a list has helped ease their anxiety. In past 2 weeks feel like one or 2 days where she was antsy and moving around. Pt did use PMR to release stress. After a release she could do it again and she would tense up again. Incense help calm pt.     Pt will be meeting with a sleep specialist in August.     Mood: keep positive and looking forward to smaller things    Therapist: will be doing family therapy with her 19 year-old daughter who moved out 5 years ago and also has a lot of anxiety   [facility] recommendations: mindfulness, PMR- setting reminders, body scan, guided visualizations       Progress on Treatment Objective(s) / Homework:  New Objective established this session - ACTION (Actively working towards change); Intervened  by reinforcing change plan / affirming steps taken    CBT: Pt reports that they used PMR skill and it helped to release tension. [facility] encourages pt to set alarms as reminders to practice this regularly to reduce tension. [facility] teaches and encourages pt to practice guided visualizations. [facility] provides psychoeducation about sleep hygiene and using guided visualizations. [facility] briefly explains and demonstrates using a body scan.     Motivational Interviewing    Pt is motivated to try guided visualizations and keep using PMR.      MI Intervention: Expressed Empathy/Understanding, Supported Autonomy, Collaboration, Evocation, Permission to raise concern or advise, Open-ended questions, Reflections: simple and complex, Change talk (evoked), and Reframe     Change Talk Expressed by the Patient: Committment to change Activation Taking steps    Provider Response to Change Talk: E - Evoked more info from patient about behavior change, A - Affirmed patient's thoughts, decisions, or attempts at behavior change, R - Reflected patient's change talk, and S - Summarized patient's change talk statements    Also provided psychoeducation about behavioral health condition, symptoms, and treatment options    Assessments completed prior to visit:  The following assessments were completed by patient for this visit:  GAD2:       11/21/2023     7:30 AM 4/15/2024     9:52 AM 6/10/2024     9:32 AM   PARIS-2   Feeling nervous, anxious, or on edge 3 3 3   Not being able to stop or control worrying 3 1 1   PARIS-2 Total Score 6    6 4 4    4       Care Plan review completed: Yes    Medication Review:  Changes to psychiatric medications, see updated Medication List in EPIC.     Medication Compliance:  Yes    Changes in Health Issues:   None reported    Chemical Use Review:   Substance Use: Chemical use reviewed, no active concerns identified      Tobacco Use: No current tobacco use.      Assessment: Current Emotional / Mental Status (status of significant  symptoms):  Risk status (Self / Other harm or suicidal ideation)  Patient denies a history of suicidal ideation, suicide attempts, self-injurious behavior, homicidal ideation, homicidal behavior, and and other safety concerns  Patient denies current fears or concerns for personal safety.  Patient denies current or recent suicidal ideation or behaviors.  Patient denies current or recent homicidal ideation or behaviors.  Patient denies current or recent self injurious behavior or ideation.  Patient denies other safety concerns.  A safety and risk management plan has not been developed at this time, however patient was encouraged to call [place] [phone] should there be a change in any of these risk factors.    Appearance:   Appropriate   Eye Contact:   Good   Psychomotor Behavior: Normal   Attitude:   Cooperative   Orientation:   All  Speech   Rate / Production: Normal    Volume:  Normal   Mood:    Anxious   Affect:    Appropriate   Thought Content:  Clear   Thought Form:  Coherent  Logical   Insight:    Good     Diagnoses:  1. Generalized anxiety disorder        Collateral Reports Completed:  Communicated with: Vikas Whittington M.D.     Plan: (Homework, other):  Patient was given information about behavioral services and encouraged to schedule a follow up appointment with the clinic [facility] in 1 month.  Patient was also given information about mental health symptoms and treatment options   CD Recommendations: No indications of CD issues.     DAWNA Orozco    ______________________________________________________________________    Integrated Primary Care Behavioral Health Treatment Plan    Patient's Name: Adela Stone  YOB: 1970    Date of Creation: in the next few visits  Date Treatment Plan Last Reviewed/Revised: in the next few visits    DSM5 Diagnoses: 300.02 (F41.1) Generalized Anxiety Disorder  Psychosocial / Contextual Factors: children, has many things on their to do list, enjoys Incense, is  mindful  PROMIS (reviewed every 90 days):    PROMIS-10 Scores      11/21/2023     8:11 AM 4/15/2024     9:55 AM   PROMIS-10 Scores Only   Global Mental Health Score 7 8   Global Physical Health Score 16 16   PROMIS TOTAL - SUBSCORES 23 24         Referral / Collaboration:  Referral to another professional/service is not indicated at this time.    Anticipated number of session for this episode of care: 5-6  Anticipation frequency of session: Monthly  Anticipated Duration of each session: 16-37 minutes  Treatment plan will be reviewed in 90 days or when goals have been changed.       Patient has reviewed and agreed to the above plan.      Michelle Moran, BLADIMIR  Katalina 10, 2024

## 2024-06-10 NOTE — NURSING NOTE
Is the patient currently in the state of [place]? YES    Visit mode:VIDEO    If the visit is dropped, the patient can be reconnected by: VIDEO VISIT: Text to cell phone:   Telephone Information:   Mobile [phone]       Will anyone else be joining the visit? NO  (If patient encounters technical issues they should call [phone]    How would you like to obtain your AVS? MyChart    Are changes needed to the allergy or medication list? No    Are refills needed on medications prescribed by this physician? NO    Reason for visit: RECHECK    Barbara PORTER

## 2024-06-25 PROCEDURE — 93248 EXT ECG>7D<15D REV&INTERPJ: CPT | Performed by: INTERNAL MEDICINE

## 2024-08-15 ENCOUNTER — VIRTUAL VISIT (OUTPATIENT)
Dept: SLEEP MEDICINE | Facility: CLINIC | Age: 54
End: 2024-08-15
Attending: FAMILY MEDICINE
Payer: COMMERCIAL

## 2024-08-15 VITALS — HEIGHT: 62 IN | BODY MASS INDEX: 30.18 KG/M2 | WEIGHT: 164 LBS

## 2024-08-15 DIAGNOSIS — E66.811 CLASS 1 OBESITY DUE TO EXCESS CALORIES WITH SERIOUS COMORBIDITY AND BODY MASS INDEX (BMI) OF 30.0 TO 30.9 IN ADULT: ICD-10-CM

## 2024-08-15 DIAGNOSIS — F41.1 GENERALIZED ANXIETY DISORDER: ICD-10-CM

## 2024-08-15 DIAGNOSIS — R00.2 PALPITATIONS: ICD-10-CM

## 2024-08-15 DIAGNOSIS — I10 PRIMARY HYPERTENSION: ICD-10-CM

## 2024-08-15 DIAGNOSIS — R29.818 SUSPECTED SLEEP APNEA: Primary | ICD-10-CM

## 2024-08-15 DIAGNOSIS — E66.09 CLASS 1 OBESITY DUE TO EXCESS CALORIES WITH SERIOUS COMORBIDITY AND BODY MASS INDEX (BMI) OF 30.0 TO 30.9 IN ADULT: ICD-10-CM

## 2024-08-15 PROCEDURE — 99205 OFFICE O/P NEW HI 60 MIN: CPT | Mod: 95 | Performed by: NURSE PRACTITIONER

## 2024-08-15 ASSESSMENT — PAIN SCALES - GENERAL: PAINLEVEL: NO PAIN (0)

## 2024-08-15 ASSESSMENT — SLEEP AND FATIGUE QUESTIONNAIRES
HOW LIKELY ARE YOU TO NOD OFF OR FALL ASLEEP WHILE SITTING INACTIVE IN A PUBLIC PLACE: WOULD NEVER DOZE
HOW LIKELY ARE YOU TO NOD OFF OR FALL ASLEEP WHILE LYING DOWN TO REST IN THE AFTERNOON WHEN CIRCUMSTANCES PERMIT: HIGH CHANCE OF DOZING
HOW LIKELY ARE YOU TO NOD OFF OR FALL ASLEEP WHEN YOU ARE A PASSENGER IN A CAR FOR AN HOUR WITHOUT A BREAK: WOULD NEVER DOZE
HOW LIKELY ARE YOU TO NOD OFF OR FALL ASLEEP IN A CAR, WHILE STOPPED FOR A FEW MINUTES IN TRAFFIC: WOULD NEVER DOZE
HOW LIKELY ARE YOU TO NOD OFF OR FALL ASLEEP WHILE WATCHING TV: WOULD NEVER DOZE
HOW LIKELY ARE YOU TO NOD OFF OR FALL ASLEEP WHILE SITTING AND TALKING TO SOMEONE: WOULD NEVER DOZE
HOW LIKELY ARE YOU TO NOD OFF OR FALL ASLEEP WHILE SITTING QUIETLY AFTER LUNCH WITHOUT ALCOHOL: WOULD NEVER DOZE
HOW LIKELY ARE YOU TO NOD OFF OR FALL ASLEEP WHILE SITTING AND READING: MODERATE CHANCE OF DOZING

## 2024-08-15 NOTE — PATIENT INSTRUCTIONS
"          MY TREATMENT INFORMATION FOR SLEEP APNEA-  Adela Stone    DOCTOR : ZOYA Matamoros CNP    Am I having a sleep study at a sleep center?  --->Due to normal delays, you will be contacted within 2-4 weeks to schedule    Am I having a home sleep study?  --->Watch the video for the device you are using:    -/drop off device-   https://www.Ihaveu.com.com/watch?v=yGGFBdELGhk    -Disposable device sent out require phone/computer application-   https://www.Ihaveu.com.com/watch?v=BCce_vbiwxE      Frequently asked questions:  1. What is Obstructive Sleep Apnea (VLAD)? VLAD is the most common type of sleep apnea. Apnea means, \"without breath.\"  Apnea is most often caused by narrowing or collapse of the upper airway as muscles relax during sleep.   Almost everyone has occasional apneas. Most people with sleep apnea have had brief interruptions at night frequently for many years.  The severity of sleep apnea is related to how frequent and severe the events are.   2. What are the consequences of VLAD? Symptoms include: feeling sleepy during the day, snoring loudly, gasping or stopping of breathing, trouble sleeping, and occasionally morning headaches or heartburn at night.  Sleepiness can be serious and even increase the risk of falling asleep while driving. Other health consequences may include development of high blood pressure and other cardiovascular disease in persons who are susceptible. Untreated VLAD  can contribute to heart disease, stroke and diabetes.   3. What are the treatment options? In most situations, sleep apnea is a lifelong disease that must be managed with daily therapy. Medications are not effective for sleep apnea and surgery is generally not considered until other therapies have been tried. Your treatment is your choice . Continuous Positive Airway (CPAP) works right away and is the therapy that is effective in nearly everyone. An oral device to hold your jaw forward is usually the next most " reliable option. Other options include postioning devices (to keep you off your back), weight loss, and surgery including a tongue pacing device. There is more detail about some of these options below.  4. Are my sleep studies covered by insurance? Although we will request verification of coverage, we advise you also check in advance of the study to ensure there is coverage.    Important tips for those choosing CPAP and similar devices  REMEMBER-IF YOU RECEIVE A CALL FROM  [phone]-->IT IS TO SETUP A DEVICE  For new devices, sign up for device EVELIA to monitor your device for your followup visits  We encourage you to utilize the [employer] evelia or website ( https://Spazzles.Referanza.com/ ) to monitor your therapy progress and share the data with your healthcare team when you discuss your sleep apnea.                                                    Know your equipment:  CPAP is continuous positive airway pressure that prevents obstructive sleep apnea by keeping the throat from collapsing while you are sleeping. In most cases, the device is  smart  and can slowly self-adjusts if your throat collapses and keeps a record every day of how well you are treated-this information is available to you and your care team.  BPAP is bilevel positive airway pressure that keeps your throat open and also assists each breath with a pressure boost to maintain adequate breathing.  Special kinds of BPAP are used in patients who have inadequate breathing from lung or heart disease. In most cases, the device is  smart  and can slowly self-adjusts to assist breathing. Like CPAP, the device keeps a record of how well you are treated.  Your mask is your connection to the device. You get to choose what feels most comfortable and the staff will help to make sure if fits. Here: are some examples of the different masks that are available: Magnetic mask aids may assist with use but there are safety issues that should be addressed when  considering with magnets* ( see end of discussion).       Key points to remember on your journey with sleep apnea:  Sleep study.  PAP devices often need to be adjusted during a sleep study to show that they are effective and adjusted right.  Good tips to remember: Try wearing just the mask during a quiet time during the day so your body adapts to wearing it. A humidifier is recommended for comfort in most cases to prevent drying of your nose and throat. Allergy medication from your provider may help you if you are having nasal congestion.  Getting settled-in. It takes more than one night for most of us to get used to wearing a mask. Try wearing just the mask during a quiet time during the day so your body adapts to wearing it. A humidifier is recommended for comfort in most cases. Our team will work with you carefully on the first day and will be in contact within 4 days and again at 2 and 4 weeks for advice and remote device adjustments. Your therapy is evaluated by the device each day.   Use it every night. The more you are able to sleep naturally for 7-8 hours, the more likely you will have good sleep and to prevent health risks or symptoms from sleep apnea. Even if you use it 4 hours it helps. Occasionally all of us are unable to use a medical therapy, in sleep apnea, it is not dangerous to miss one night.   Communicate. Call our skilled team on the number provided on the first day if your visit for problems that make it difficult to wear the device. Over 2 out of 3 patients can learn to wear the device long-term with help from our team. Remember to call our team or your sleep providers if you are unable to wear the device as we may have other solutions for those who cannot adapt to mask CPAP therapy. It is recommended that you sleep your sleep provider within the first 3 months and yearly after that if you are not having problems.   Use it for your health. We encourage use of CPAP masks during daytime quiet  periods to allow your face and brain to adapt to the sensation of CPAP so that it will be a more natural sensation to awaken to at night or during naps. This can be very useful during the first few weeks or months of adapting to CPAP though it does not help medically to wear CPAP during wakefulness and  should not be used as a strategy just to meet guidelines.  Take care of your equipment. Make sure you clean your mask and tubing using directions every day and that your filter and mask are replaced as recommended or if they are not working.     *Masks with magnets:  Updated Contraindications  Masks with magnetic components are contraindicated for use by patients where they, or anyone in close physical contact while using the mask, have the following:   Active medical implants that interact with magnets (i.e., pacemakers, implantable cardioverter defibrillators (ICD), neurostimulators, cerebrospinal fluid (CSF) shunts, insulin/infusion pumps)   Metallic implants/objects containing ferromagnetic material (i.e., aneurysm clips/flow disruption devices, embolic coils, stents, valves, electrodes, implants to restore hearing or balance with implanted magnets, ocular implants, metallic splinters in the eye)  Updated Warning  Keep the mask magnets at a safe distance of at least 6 inches (150 mm) away from implants or medical devices that may be adversely affected by magnetic interference. This warning applies to you or anyone in close physical contact with your mask. The magnets are in the frame and lower headgear clips, with a magnetic field strength of up to 400mT. When worn, they connect to secure the mask but may inadvertently detach while asleep.  Implants/medical devices, including those listed within contraindications, may be adversely affected if they change function under external magnetic fields or contain ferromagnetic materials that attract/repel to magnetic fields (some metallic implants, e.g., contact lenses  with metal, dental implants, metallic cranial plates, screws, rhiannon hole covers, and bone substitute devices). Consult your physician and  of your implant / other medical device for information on the potential adverse effects of magnetic fields.    BESIDES CPAP, WHAT OTHER THERAPIES ARE THERE?    Positioning Device  Positioning devices are generally used when sleep apnea is mild and only occurs on your back.This example shows a pillow that straps around the waist. It may be appropriate for those whose sleep study shows milder sleep apnea that occurs primarily when lying flat on one's back. Preliminary studies have shown benefit but effectiveness at home may need to be verified by a home sleep test. These devices are generally not covered by medical insurance.  Examples of devices that maintain sleeping on the back to prevent snoring and mild sleep apnea.    Belt type body positioner  http://Force-A/    Electronic reminder  http://nightshifttherapy.LiveHive Systems/            Oral Appliance  What is oral appliance therapy?  An oral appliance device fits on your teeth at night like a retainer used after having braces. The device is made by a specialized dentist and requires several visits over 1-2 months before a manufactured device is made to fit your teeth and is adjusted to prevent your sleep apnea. Once an oral device is working properly, snoring should be improved. A home sleep test may be recommended at that time if to determine whether the sleep apnea is adequately treated.       Some things to remember:  -Oral devices are often, but not always, covered by your medical insurance. Be sure to check with your insurance provider.   -If you are referred for oral therapy, you will be given a list of specialized dentists to consider or you may choose to visit the Web site of the American Academy of Dental Sleep Medicine  -Oral devices are less likely to work if you have severe sleep apnea or are extremely  overweight.     More detailed information  An oral appliance is a small acrylic device that fits over the upper and lower teeth  (similar to a retainer or a mouth guard). This device slightly moves jaw forward, which moves the base of the tongue forward, opens the airway, improves breathing for effective treat snoring and obstructive sleep apnea in perhaps 7 out of 10 people .  The best working devices are custom-made by a dental device  after a mold is made of the teeth 1, 2, 3.  When is an oral appliance indicated?  Oral appliance therapy is recommended as a first-line treatment for patients with primary snoring, mild sleep apnea, and for patients with moderate sleep apnea who prefer appliance therapy to use of CPAP4, 5. Severity of sleep apnea is determined by sleep testing and is based on the number of respiratory events per hour of sleep.   How successful is oral appliance therapy?  The success rate of oral appliance therapy in patients with mild sleep apnea is 75-80% while in patients with moderate sleep apnea it is 50-70%. The chance of success in patients with severe sleep apnea is 40-50%. The research also shows that oral appliances have a beneficial effect on the cardiovascular health of VLAD patients at the same magnitude as CPAP therapy7.  Oral appliances should be a second-line treatment in cases of severe sleep apnea, but if not completely successful then a combination therapy utilizing CPAP plus oral appliance therapy may be effective. Oral appliances tend to be effective in a broad range of patients although studies show that the patients who have the highest success are females, younger patients, those with milder disease, and less severe obesity. 3, 6.   Finding a dentist that practices dental sleep medicine  Specific training is available through the American Academy of Dental Sleep Medicine for dentists interested in working in the field of sleep. To find a dentist who is educated in  the field of sleep and the use of oral appliances, near you, visit the Web site of the American Academy of Dental Sleep Medicine.    References  1. Monica, et al. Objectively measured vs self-reported compliance during oral appliance therapy for sleep-disordered breathing. Chest 2013; 144(5): 9194-3884.  2. Kristal et al. Objective measurement of compliance during oral appliance therapy for sleep-disordered breathing. Thorax 2013; 68(1): 91-96.  3. Becka et al. Mandibular advancement devices in 620 men and women with VLAD and snoring: tolerability and predictors of treatment success. Chest 2004; 125: 4908-6889.  4. Pablo, et al. Oral appliances for snoring and VLAD: a review. Sleep 2006; 29: 244-262.  5. Taiwo et al. Oral appliance treatment for VLAD: an update. J Clin Sleep Med 2014; 10(2): 215-227.  6. Fabby et al. Predictors of OSAH treatment outcome. J Dent Res 2007; 86: 8294-1843.      Weight Loss:   Your Body mass index is 30 kg/m .    Being overweight does not necessarily mean you will have health consequences.  Those who have BMI over 35 or over 27 with existing medical conditions carries greater risk.   Weight loss decreases severity of sleep apnea in most people with obesity. For those with mild obesity who have developed snoring with weight gain, even 15-30 pound weight loss can improve and occasionally milder eliminate sleep apnea.  Structured and life-long dietary and health habits are necessary to lose weight and keep healthier weight levels.     The Comprehensive Weight loss program offers all aspects of weight loss strategies including two Non-Surgical Weight Loss Programs: Medical Weight Management and our 24 Week Healthy Lifestyle Program:    Medical Weight Management: You will meet with a Medical Weight Management Provider, as well as a Registered Dietician. The program may include medication therapy, dietary education, recommended exercise and physical therapy programs,  monthly support group meetings, and possible psychological counseling. Follow up visits with the provider or dietician are scheduled based on your progress and needs.    24 Week Healthy Lifestyle Program: This unique program is designed to give you the support of weekly appointments and activities thru a 24-week period. It may include all of the components of the basic program (above), with the addition of 11 individual Health  Visits, 24-week access to the [employer] website for over 700 online classes, and monthly support group meetings. This program has an out-of-pocket expense of $499 to cover the items that can not be billed to insurance (health coaches and [employer] access), and is non-refundable/non-transferable (you may be able to use a Health Savings Account; ask your HSA provider). There may be an optional meal replacement plan prescribed as well.   Surgical management achieves meaningful long-term weight loss and improvement in health risks in most patients with more severe obesity.      Sleep Apnea Surgery:    Surgery for obstructive sleep apnea is considered generally only when other therapies fail to work. Surgery may be discussed with you if you are having a difficult time tolerating CPAP and or when there is an abnormal structure that requires surgical correction.  Nose and throat surgeries often enlarge the airway to prevent collapse.  Most of these surgeries create pain for 1-2 weeks and up to half of the most common surgeries are not effective throughout life.  You should carefully discuss the benefits and drawbacks to surgery with your sleep provider and surgeon to determine if it is the best solution for you.   More information  Surgery for VLAD is directed at areas that are responsible for narrowing or complete obstruction of the airway during sleep.  There are a wide range of procedures available to enlarge and/or stabilize the airway to prevent blockage of breathing in the three major  areas where it can occur: the palate, tongue, and nasal regions.  Successful surgical treatment depends on the accurate identification of the factors responsible for obstructive sleep apnea in each person.  A personalized approach is required because there is no single treatment that works well for everyone.  Because of anatomic variation, consultation with an examination by a sleep surgeon is a critical first step in determining what surgical options are best for each patient.  In some cases, examination during sedation may be recommended in order to guide the selection of procedures.  Patients will be counseled about risks and benefits as well as the typical recovery course after surgery. Surgery is typically not a cure for a person s VLAD.  However, surgery will often significantly improve one s VLAD severity (termed  success rate ).  Even in the absence of a cure, surgery will decrease the cardiovascular risk associated with OSA7; improve overall quality of life8 (sleepiness, functionality, sleep quality, etc).      Palate Procedures:  Patients with VLAD often have narrowing of their airway in the region of their tonsils and uvula.  The goals of palate procedures are to widen the airway in this region as well as to help the tissues resist collapse.  Modern palate procedure techniques focus on tissue conservation and soft tissue rearrangement, rather than tissue removal.  Often the uvula is preserved in this procedure. Residual sleep apnea is common in patient after pharyngoplasty with an average reduction in sleep apnea events of 33%2.      Tongue Procedures:  ExamWhile patients are awake, the muscles that surround the throat are active and keep this region open for breathing. These muscles relax during sleep, allowing the tongue and other structures to collapse and block breathing.  There are several different tongue procedures available.  Selection of a tongue base procedure depends on characteristics seen on  physical exam.  Generally, procedures are aimed at removing bulky tissues in this area or preventing the back of the tongue from falling back during sleep.  Success rates for tongue surgery range from 50-62%3.    Hypoglossal Nerve Stimulation:  Hypoglossal nerve stimulation has recently received approval from the [place] Food and Drug Administration for the treatment of obstructive sleep apnea.  This is based on research showing that the system was safe and effective in treating sleep apnea6.  Results showed that the median AHI score decreased 68%, from 29.3 to 9.0. This therapy uses an implant system that senses breathing patterns and delivers mild stimulation to airway muscles, which keeps the airway open during sleep.  The system consists of three fully implanted components: a small generator (similar in size to a pacemaker), a breathing sensor, and a stimulation lead.  Using a small handheld remote, a patient turns the therapy on before bed and off upon awakening.    Candidates for this device must be greater than 18 years of age, have moderate to severe obstructive sleep apnea with less than 25% central events  (AHI between 15-65), BMI less than 35, have tried CPAP/oral appliance for at least 8 weeks without success, and have appropriate upper airway anatomy (determined by a sleep endoscopy performed by Dr. Ortiz Beckford or Dr. Dick cShafer).    Nasal Procedures:  Nasal obstruction can interfere with nasal breathing during the day and night.  Studies have shown that relief of nasal obstruction can improve the ability of some patients to tolerate positive airway pressure therapy for obstructive sleep apnea1.  Treatment options include medications such as nasal saline, topical corticosteroid and antihistamine sprays, and oral medications such as antihistamines or decongestants. Non-surgical treatments can include external nasal dilators for selected patients. If these are not successful by themselves,  surgery can improve the nasal airway either alone or in combination with these other options.        Combination Procedures:  Combination of surgical procedures and other treatments may be recommended, particularly if patients have more than one area of narrowing or persistent positional disease.  The success rate of combination surgery ranges from 66-80%2,3.    References  Rosibel MURILLO. The Role of the Nose in Snoring and Obstructive Sleep Apnoea: An Update.  Eur Arch Otorhinolaryngol. 2011; 268: 1365-73.   Cindy SM; Vy JA; Derrick JR; Pallanch JF; Sydney MB; Vickey SG; Girish LONGO. Surgical modifications of the upper airway for obstructive sleep apnea in adults: a systematic review and meta-analysis. SLEEP 2010;33(10):0660-5180. Meaghan AVILA. Hypopharyngeal surgery in obstructive sleep apnea: an evidence-based medicine review.  Arch Otolaryngol Head Neck Surg. 2006 Feb;132(2):206-13.  Reinier YH1, Rafael Y, Salomón JONNATHAN. The efficacy of anatomically based multilevel surgery for obstructive sleep apnea. Otolaryngol Head Neck Surg. 2003 Oct;129(4):327-35.  Meaghan AVILA, Goldberg A. Hypopharyngeal Surgery in Obstructive Sleep Apnea: An Evidence-Based Medicine Review. Arch Otolaryngol Head Neck Surg. 2006 Feb;132(2):206-13.  Jesus Manuel MCFARLANE et al. Upper-Airway Stimulation for Obstructive Sleep Apnea.  N Engl J Med. 2014 Jan 9;370(2):139-49.  Hayder Y et al. Increased Incidence of Cardiovascular Disease in Middle-aged Men with Obstructive Sleep Apnea. Am J Respir Crit Care Med; 2002 166: 159-165  Ramirez EM et al. Studying Life Effects and Effectiveness of Palatopharyngoplasty (SLEEP) study: Subjective Outcomes of Isolated Uvulopalatopharyngoplasty. Otolaryngol Head Neck Surg. 2011; 144: 623-631.        WHAT IF I ONLY HAVE SNORING?    Mandibular advancement devices, lateral sleep positioning, long-term weight loss and treatment of nasal allergies have been shown to improve snoring.  Exercising tongue muscles with a game  (https://Sproutkin.Splash.FM.Noomeo/us/evelia/soundly-reduce-snoring/[email or web address]) or stimulating the tongue during the day with a device (https://doi.org/10.3390/nas06837817) have improved snoring in some individuals.  https://www.PermissionTV.Noomeo/  https://www.sleepfoundation.org/best-anti-snoring-mouthpieces-and-mouthguards    Remember to Drive Safe... Drive Alive     Sleep health profoundly affects your health, mood, and your safety.  Thirty three percent of the population (one in three of us) is not getting enough sleep and many have a sleep disorder. Not getting enough sleep or having an untreated / undertreated sleep condition may make us sleepy without even knowing it. In fact, our driving could be dramatically impaired due to our sleep health. As your provider, here are some things I would like you to know about driving:     Here are some warning signs for impairment and dangerous drowsy driving:              -Having been awake more than 16 hours               -Looking tired               -Eyelid drooping              -Head nodding (it could be too late at this point)              -Driving for more than 30 minutes     Some things you could do to make the driving safer if you are experiencing some drowsiness:              -Stop driving and rest              -Call for transportation              -Make sure your sleep disorder is adequately treated     Some things that have been shown NOT to work when experiencing drowsiness while driving:              -Turning on the radio              -Opening windows              -Eating any  distracting  /  entertaining  foods (e.g., sunflower seeds, candy, or any other)              -Talking on the phone      Your decision may not only impact your life, but also the life of others. Please, remember to drive safe for yourself and all of us.

## 2024-08-15 NOTE — PROGRESS NOTES
Virtual Visit Details    Type of service:  Video Visit 9:00 AM-9:45 AM    Originating Location (pt. Location): Home    Distant Location (provider location):  Off-site  Platform used for Video Visit: [facility]        Outpatient Sleep Medicine Consultation:      Name: Adela Stone MRN# [id number]   Age: 54 year old YOB: 1970     Date of Consultation: August 15, 2024  Consultation is requested by: Paty Honeycutt MD  [address] [place] [address] Paty Honeycutt  Primary care provider: Paty Honeycutt       Reason for Sleep Consult:     Adela Stone is sent by Paty Honeycutt for a sleep consultation regarding suspected sleep apnea suspected sleep apnea.    Patient s Reason for visit  Adela Stone main reason for visit: Very load snoring  Patient states problem(s) started: Always  Adela Stone's goals for this visit: Identify root cause and solutions           Assessment and Plan:     Impression/Plan:    ICD-10-CM    1. Suspected sleep apnea  R29.818 Adult Sleep Eval & Management  Referral     HST-Home Sleep Apnea Test - Noxturnal Returnable      2. Primary hypertension  I10 HST-Home Sleep Apnea Test - Noxturnal Returnable      3. Palpitations  R00.2 HST-Home Sleep Apnea Test - Noxturnal Returnable      4. Class 1 obesity due to excess calories with serious comorbidity and body mass index (BMI) of 30.0 to 30.9 in adult  E66.09 HST-Home Sleep Apnea Test - Noxturnal Returnable    Z68.30       5. Generalized anxiety disorder  F41.1 HST-Home Sleep Apnea Test - Noxturnal Returnable      Plans for Adela Stone  include a Home Sleep Study. Adela has a STOP BANG score of 6/8 with positives for snoring, fatigue, blood pressure, BMI, age and neck circumference.   Adela Stone has comorbidities of hypertension, postmenopausal, social anxiety disorder, class I obesity with serious comorbidity, vitamin D deficiency, and hypertension.    Recommend that Adela optimize her sleep schedule as well  "as her sleep hygiene practices to mitigate any further sleep disruption.  Recommend patient employ safe driving practices such as not driving a motor vehicle should she become drowsy  Recommend Adela maintain her BMI of 30.0 or less  Recommend Adela return to the sleep medicine center 2-3 weeks after her sleep study so we may discuss her results and decide the next steps in her plan of care together.    60 minutes spent with patient, all of which were spent face-to-face counseling, consulting, coordinating plan of care.      ZOYA Matamoros CNP         History of Present Illness:     Adela Stone presents to the sleep medicine clinic with concerns of very loud snoring.    Adela Stone has a medical history pertinent for being postmenopausal, hypertension, generalized anxiety disorder, palpitations, and vitamin D deficiency.  She also has class I obesity with serious comorbidity.    Suffers from socially disruptive snoring, snort arousals, unaware of episodes of apnea, morning mouth dryness, hypnagogic hallucinations, difficulty breathing through her nose, sore throat upon awakening.    Mother has a history of obstructive sleep apnea and uses CPAP.    Dental provider brought up to her attention that her wear pattern on her teeth suggested that she may have sleep apnea.  She should have a sleep test completed.    Loud snoring, daytime sleepiness.  Children bring it to her attention that she is snoring loudly and they can hear her throughout the house.    /86 (BP Location: Left arm, Patient Position: Sitting, Cuff Size: Adult Regular)   Pulse 110   Temp 98.3  F (36.8  C) (Oral)   Resp 14   Ht 1.582 m (5' 2.3\")   Wt 75.6 kg (166 lb 9.6 oz)   LMP  (LMP Unknown)   SpO2 98%   BMI 30.18 kg/m    Body mass index is 30.18 kg/m .     Tonsils: In.  States she has always been told she has large tonsils.    Neck Circumference: Wears a size large shirt    [place] Sleepiness Scale  Total score - Hampton: 5   " (Less than 10 normal)     Insomnia Severity Scale  ESPERANZA Total Score: 5  (normal 0-7, mild 8-14, moderate 15-21, severe 22-28)    STOP-BANG score 6/8 indicating an increased pretest probability for obstructive sleep apnea.  Discussed basic pathophysiology for central sleep apnea as well as that for obstructive sleep apnea.  Discussed implications of untreated sleep apnea and reviewed surgical and nonsurgical treatments for sleep apnea.  References were placed in her after visit summary sheet.    Social History:  [employer], internal misconduct    Past Sleep Evaluations:    none  SLEEP-WAKE SCHEDULE:     Work/School Days: Patient goes to school/work: Yes   Usually gets into bed at Midnight  Takes patient about Short time - usually reading articles until falling asleep to fall asleep  Has trouble falling asleep Rarely nights per week  Wakes up in the middle of the night 3-4 times.  Wakes up due to Snorting self awake;External stimuli (bed partner, pets, noise, etc)  She has trouble falling back asleep No times a week.   It usually takes Few minutes if not disturbed to get back to sleep  Patient is usually up at 7 AM  Uses alarm: Yes    Weekends/Non-work Days/All Other Days:  Usually gets into bed at 1 AM   Takes patient about Few minutes to fall asleep  Patient is usually up at 9 AN  Uses alarm: No    Sleep Need  Patient gets  6 hours during week sleep on average   Patient thinks she needs about 8 sleep    Adela Stone prefers to sleep in this position(s): Back;Side   Patient states they do the following activities in bed: Watch TV;Use phone, computer, or tablet    Naps  Patient takes a purposeful nap If opportunities once a week times a week and naps are usually 1-2 hours in duration  She feels better after a nap: Yes  She dozes off unintentionally None days per week  Patient has had a driving accident or near-miss due to sleepiness/drowsiness: No      SLEEP DISRUPTIONS:    Breathing/Snoring  Patient snores:Yes  Other  people complain about her snoring: Yes  Patient has been told she stops breathing in her sleep:No  She has issues with the following: Morning mouth dryness.    Movement:  Patient gets pain, discomfort, with an urge to move:  No restless legs symptoms  It happens when she is resting:  No  It happens more at night:  No  Patient has been told she kicks her legs at night:  No     Behaviors in Sleep:  Adela Stone has experienced the following behaviors while sleeping: Eating;See or hear things that are not really there upon awakening or just falling asleep  She has experienced sudden muscle weakness during the day: No  Pt denies bruxism, sleep talking, sleep walking, and dream enactment behavior. Pt denies sleep paralysis, hypnagogue and cataplexy.       Is there anything else you would like your sleep provider to know:        CAFFEINE AND OTHER SUBSTANCES:    Patient consumes caffeinated beverages per day:  None  Last caffeine use is usually: Na  List of any prescribed or over the counter stimulants that patient takes: None  List of any prescribed or over the counter sleep medication patient takes: None  List of previous sleep medications that patient has tried: None  Patient drinks alcohol to help them sleep: No  Patient drinks alcohol near bedtime: No    Family History:  Patient has a family member been diagnosed with a sleep disorder: Yes  Mother - snoring- has cpap         SCALES:    EPWORTH SLEEPINESS SCALE         8/15/2024     8:39 AM    [place] Sleepiness Scale ( PERI Galloway  [id number]<br>ESS - [place]/English - Final version - 21 Nov 07 - [facility] [place].)   Sitting and reading Moderate chance of dozing   Watching TV Would never doze   Sitting, inactive in a public place (e.g. a theatre or a meeting) Would never doze   As a passenger in a car for an hour without a break Would never doze   Lying down to rest in the afternoon when circumstances permit High chance of dozing   Sitting and talking to someone  Would never doze   Sitting quietly after a lunch without alcohol Would never doze   In a car, while stopped for a few minutes in traffic Would never doze   Jennings Score (MC) 5   Jennings Score (Sleep) 5         INSOMNIA SEVERITY INDEX (ESPERANZA)          8/15/2024     8:38 AM   Insomnia Severity Index (ESPERANZA)   Difficulty falling asleep 0   Difficulty staying asleep 0   Problems waking up too early 0   How SATISFIED/DISSATISFIED are you with your CURRENT sleep pattern? 3   How NOTICEABLE to others do you think your sleep problem is in terms of impairing the quality of your life? 0   How WORRIED/DISTRESSED are you about your current sleep problem? 1   To what extent do you consider your sleep problem to INTERFERE with your daily functioning (e.g. daytime fatigue, mood, ability to function at work/daily chores, concentration, memory, mood, etc.) CURRENTLY? 1   ESPERANZA Total Score 5       Guidelines for Scoring/Interpretation:  Total score categories:  0-7 = No clinically significant insomnia   8-14 = Subthreshold insomnia   15-21 = Clinical insomnia (moderate severity)  22-28 = Clinical insomnia (severe)  Used via courtesy of www.[place].gov with permission from Chuck Oscar PhD., [facility]      STOP BANG           8/15/2024     9:18 AM   STOP BANG Questionnaire (  2008, the American Society of Anesthesiologists, Inc. [employer]   1. Snoring - Do you snore loudly (louder than talking or loud enough to be heard through closed doors)? Yes   2. Tired - Do you often feel tired, fatigued, or sleepy during daytime? Yes   3. Observed - Has anyone observed you stop breathing during your sleep? No   4. Blood pressure - Do you have or are you being treated for high blood pressure? Yes   5. BMI - BMI more than 35 kg/m2? Yes   6. Age - Age over 50 yr old? Yes   7. Neck circumference - Neck circumference greater than 40 cm? No   8. Gender - Gender male? No   STOP BANG Score (MC): 6 (High risk of VLAD)  "        GAD7        6/10/2024     9:32 AM   PARIS-7    1. Feeling nervous, anxious, or on edge 3   2. Not being able to stop or control worrying 1   3. Worrying too much about different things 3   4. Trouble relaxing 1   5. Being so restless that it is hard to sit still 1   6. Becoming easily annoyed or irritable 0   7. Feeling afraid, as if something awful might happen 1   PARIS-7 Total Score 10    10   If you checked any problems, how difficult have they made it for you to do your work, take care of things at home, or get along with other people? Somewhat difficult         CAGE-AID        11/21/2023     8:11 AM   CAGE-AID Flowsheet   Have you ever felt you should Cut down on your drinking or drug use? 0   Have people Annoyed you by criticizing your drinking or drug use? 0   Have you ever felt bad or Guilty about your drinking or drug use? 0   Have you ever had a drink or used drugs first thing in the morning to steady your nerves or to get rid of a hangover? (Eye opener) 0   CAGE-AID SCORE 0       CAGE-AID reprinted with permission from the [place] Medical Journal, VIDYA Kinsey. and PRACHI Hagen, \"Conjoint screening questionnaires for alcohol and drug abuse\" [place] Medical Journal 94: 135-140, 1995.      PATIENT HEALTH QUESTIONNAIRE-9 (PHQ - 9)        5/24/2024     3:09 PM   PHQ-9 ([employer])   1.  Little interest or pleasure in doing things 2   2.  Feeling down, depressed, or hopeless 3   3.  Trouble falling or staying asleep, or sleeping too much 0   4.  Feeling tired or having little energy 2   5.  Poor appetite or overeating 2   6.  Feeling bad about yourself - or that you are a failure or have let yourself or your family down 3   7.  Trouble concentrating on things, such as reading the newspaper or watching television 1   8.  Moving or speaking so slowly that other people could have noticed. Or the opposite - being so fidgety or restless that you have been moving around a lot more than usual 0   9.  Thoughts that " you would be better off dead, or of hurting yourself in some way 0   PHQ-9 Total Score 13   6.  Feeling bad about yourself 3   7.  Trouble concentrating 1   8.  Moving slowly or restless 0   9.  Suicidal or self-harm thoughts 0   1.  Little interest or pleasure in doing things More than half the days   2.  Feeling down, depressed, or hopeless Nearly every day   3.  Trouble falling or staying asleep, or sleeping too much Not at all   4.  Feeling tired or having little energy More than half the days   5.  Poor appetite or overeating More than half the days   6.  Feeling bad about yourself Nearly every day   7.  Trouble concentrating Several days   8.  Moving slowly or restless Not at all   9.  Suicidal or self-harm thoughts Not at all   PHQ-9 via [facility] TOTAL SCORE-----> 13 (Moderate depression)   Difficulty at work, home, or with people Somewhat difficult       Developed by Clement Yi, Palak López, Nikhil Dos Santos and colleagues, with an educational federico from [employer] No permission required to reproduce, translate, display or distribute.        Allergies:    No Known Allergies    Medications:    Current Outpatient Medications   Medication Sig Dispense Refill    desvenlafaxine succinate (PRISTIQ) 25 MG 24 hr tablet Take 1 tablet (25 mg) by mouth daily 90 tablet 0    lisinopril (ZESTRIL) 10 MG tablet Take 1 tablet (10 mg) by mouth daily 90 tablet 1    lisinopril (ZESTRIL) 10 MG tablet Take 1 tablet (10 mg) by mouth daily 90 tablet 0    LORazepam (ATIVAN) 0.5 MG tablet Take one daily as needed for severe anxiety 30 tablet 0    propranolol (INDERAL) 20 MG tablet Take one tablet twice a day as needed for anxiety. Take 30-60 minutes ahead of an anxiety provoking event. 30 tablet 2       Problem List:  Patient Active Problem List    Diagnosis Date Noted    Vitamin D deficiency 05/24/2024     Priority: Medium     Created by Conversion  Replacement Utility updated for latest IMO load  Formatting of this  note might be different from the original.   Created by Conversion      Replacement Utility updated for latest IMO load      Rosacea 2024     Priority: Medium     Created by Conversion  Formatting of this note might be different from the original.   Created by Conversion      Palpitations 2024     Priority: Medium     Created by Conversion  Formatting of this note might be different from the original.   Created by Conversion      Suspected sleep apnea 2024     Priority: Medium    Class 1 obesity with serious comorbidity and body mass index (BMI) of 30.0 to 30.9 in adult 2024     Priority: Medium    Social anxiety disorder 04/15/2024     Priority: Medium    Postmenopausal bleeding 2024     Priority: Medium    Health care maintenance 2023     Priority: Medium    Primary hypertension 2023     Priority: Medium    Generalized anxiety disorder 2018     Priority: Medium        Past Medical/Surgical History:  Past Medical History:   Diagnosis Date    Anxiety      Past Surgical History:   Procedure Laterality Date    OPEN REDUCTION INTERNAL FIXATION FINGER(S)      PA DENTAL SURGERY PROCEDURE      Description: Oral Surgery Tooth Extraction;  Recorded: 2013;       Social History:  Social History     Socioeconomic History    Marital status:      Spouse name: Not on file    Number of children: Not on file    Years of education: Not on file    Highest education level: Not on file   Occupational History    Not on file   Tobacco Use    Smoking status: Never     Passive exposure: Never    Smokeless tobacco: Never   Vaping Use    Vaping status: Never Used   Substance and Sexual Activity    Alcohol use: Never     Alcohol/week: 2.5 standard drinks of alcohol     Types: 3 Standard drinks or equivalent per week    Drug use: No    Sexual activity: Yes     Partners: Male     Birth control/protection: None     Comment:   4 para 3 is a    Other  Topics Concern    Not on file   Social History Narrative     kelli 4 para 3 works for [employer]        5/24/2024 has three kids, ages 19, 17 and 12 yr olds. 19 yr old just moved out. No significant other. 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 For fun she spends a lot of time watching kids sports and fosters dogs. Working on spring flowers.      Social Determinants of Health     Financial Resource Strain: Low Risk  (5/24/2024)    Financial Resource Strain     Within the past 12 months, have you or your family members you live with been unable to get utilities (heat, electricity) when it was really needed?: No   Food Insecurity: Low Risk  (5/24/2024)    Food Insecurity     Within the past 12 months, did you worry that your food would run out before you got money to buy more?: No     Within the past 12 months, did the food you bought just not last and you didn t have money to get more?: No   Transportation Needs: Low Risk  (5/24/2024)    Transportation Needs     Within the past 12 months, has lack of transportation kept you from medical appointments, getting your medicines, non-medical meetings or appointments, work, or from getting things that you need?: No   Physical Activity: Not on file   Stress: Not on file   Social Connections: Not on file   Interpersonal Safety: Low Risk  (3/14/2024)    Interpersonal Safety     Do you feel physically and emotionally safe where you currently live?: Yes     Within the past 12 months, have you been hit, slapped, kicked or otherwise physically hurt by someone?: No     Within the past 12 months, have you been humiliated or emotionally abused in other ways by your partner or ex-partner?: No   Housing Stability: Low Risk  (5/24/2024)    Housing Stability     Do you have housing? : Yes     Are you worried about losing your housing?: No       Family History:  Family History   Problem Relation Age of Onset    Hypertension Mother     Endometrial Cancer Mother     Heart Failure  "Mother     Diabetes Type 2  Father     Hypertension Father     Diabetes Father     Hypertension Brother     Hypertension Brother     Lung Cancer Maternal Grandmother     Hypertension Maternal Grandmother     Hypertension Maternal Grandfather     Cancer Paternal Grandmother     Cancer Paternal Grandfather     Breast Cancer No family hx of     Ovarian Cancer No family hx of     Colon Cancer No family hx of        Review of Systems:  A complete review of systems reviewed by me is negative with the exeption of what has been mentioned in the history of present illness.        Physical Examination:  Vitals: Ht 1.575 m (5' 2\")   Wt 74.4 kg (164 lb)   LMP  (LMP Unknown)   BMI 30.00 kg/m    BMI= Body mass index is 30 kg/m .         Physical Exam   Constitutional: She appears healthy. No distress.   HENT:   Nose: Nose normal. No nasal discharge.   Mouth/Throat: Oropharynx is clear.   Eyes: Conjunctivae are normal.   Pulmonary/Chest: Effort normal.   Musculoskeletal:      Cervical back: Normal range of motion and neck supple.   Neurological: She is alert and oriented to person, place, and time.        Physical examination is limited by the nature of a virtual visit      All Labs Personally Reviewed                     Data: All pertinent previous laboratory data reviewed     Recent Labs   Lab Test 04/01/24  0930 09/12/23  0908    141   POTASSIUM 4.1 4.5   CHLORIDE 106 105   CO2 27 28   ANIONGAP 9 8   * 101*   BUN 12.1 13.2   CR 0.82 0.80   BRITTANY 9.1 9.6       Recent Labs   Lab Test 09/12/23  0908   WBC 5.5   RBC 4.42   HGB 13.3   HCT 39.8   MCV 90   MCH 30.1   MCHC 33.4   RDW 12.8          Recent Labs   Lab Test 09/12/23  0908   PROTTOTAL 7.2   ALBUMIN 4.3   BILITOTAL 0.4   ALKPHOS 79   AST 27   ALT 38       TSH   Date Value   05/24/2024 1.77 uIU/mL   10/22/2019 1.39 mU/L   07/30/2018 1.32 uIU/mL       No results found for: \"UAMP\", \"UBARB\", \"BENZODIAZEUR\", \"UCANN\", \"UCOC\", \"OPIT\", \"UPCP\"    No results " "found for: \"IRONSAT\", \"SS82382\", \"DAPHNE\"    No results found for: \"PH\", \"PHARTERIAL\", \"PO2\", \"[id number]\", \"SAT\", \"PCO2\", \"HCO3\", \"BASEEXCESS\", \"IVET\", \"BEB\"    @LABRCNTIPR(phv:4,pco2v:4,po2v:4,hco3v:4,abdirizak:4,o2per:4)@    Echocardiology: No results found for this or any previous visit (from the past 4320 hour(s)).        Chest x-ray: No results found for this or any previous visit from the past 365 days.      Chest CT: No results found for this or any previous visit from the past 365 days.      PFT: Most Recent Breeze Pulmonary Function Testing        ZOYA Matamoros CNP 8/15/2024   Sleep Medicine            "

## 2024-08-15 NOTE — NURSING NOTE
Is the patient currently in the state of [place]? YES    Visit mode:VIDEO    If the visit is dropped, the patient can be reconnected by: VIDEO VISIT: Send to e-mail at: [email or web address]    Will anyone else be joining the visit? NO  (If patient encounters technical issues they should call [phone]    How would you like to obtain your AVS? MyChart    Are changes needed to the allergy or medication list? No    Rooming Documentation:  Assigned questionnaire(s) completed      Reason for visit: Video Visit (New apt )    Johnna PORTER

## 2024-08-20 NOTE — PROGRESS NOTES
[facility]  August 22, 2024      Behavioral Health Clinician Progress Note    Patient Name: Adela Stone           Service Type:  Individual      Service Location:   [employer] / Email (patient reached)     Session Start Time: 710am  Session End Time: 737am       Session Length: 16 - 37      Attendees: Patient     Service Modality:  Video Visit:      Provider verified identity through the following two step process.  Patient provided:  Patient is known previously to provider and Patient was verified at admission/transfer    Telemedicine Visit: The patient's condition can be safely assessed and treated via synchronous audio and visual telemedicine encounter.      Reason for Telemedicine Visit: Patient has requested telehealth visit    Originating Site (Patient Location): Patient's home    Distant Site (Provider Location): Provider Remote Setting- Home Office    Consent:  The patient/guardian has verbally consented to: the potential risks and benefits of telemedicine (video visit) versus in person care; bill my insurance or make self-payment for services provided; and responsibility for payment of non-covered services.     Patient would like the video invitation sent by:  My Chart    Mode of Communication:  Video Conference via [facility]    Distant Location (Provider):  Off-site    As the provider I attest to compliance with applicable laws and regulations related to telemedicine.    Visit Activities (Refresh list every visit): [facility] Only    Diagnostic Assessment Date: 11/21/2023  Treatment Plan Review Date: 11/20/24  See Flowsheets for today's PHQ-9 and PARIS-7 results  Previous PHQ-9:       3/14/2024     3:40 PM 4/4/2024    10:02 AM 5/24/2024     3:09 PM   PHQ-9 SCORE   PHQ-9 Total Score MyChart 10 (Moderate depression)  13 (Moderate depression)   PHQ-9 Total Score 10 11 13     Previous PARIS-7:       4/15/2024     9:52 AM 5/24/2024     3:09 PM 6/10/2024     9:32 AM    PARIS-7 SCORE   Total Score 9 (mild anxiety) 16 (severe anxiety) 10 (moderate anxiety)   Total Score 9 16 10    10           DATA  Extended Session (60+ minutes): No  Interactive Complexity: No  Crisis: No  [facility] Patient: No    Treatment Objective(s) Addressed in This Session:  Target Behavior(s): disease management/lifestyle changes manage anxiety    Anxiety: will experience a reduction in anxiety, will develop more effective coping skills to manage anxiety symptoms, and will increase ability to function adaptively    Current Stressors / Issues:  Questions/Thoughts for Dr. Whittington:    Better/worse: stable and working to manage anxiety and panic   ADLs: appetite- is emotional eating to pacify herself, sleep- unchanged, having demands at work and other things and when going to bed pt will fall asleep okay and they may not be getting enough sleep at times, she had sleep visit and will be doing a sleep study, hygiene- no concerns  Current Symptoms: Pt says that they are stable and there isn't much change. She hasn't had any s/e adverse anxiety with the step down. Pt is able to try to manage the stress and tension. Yana is using a combination of breathing and will release their shoulders. Pt will do a release when they are laying down and before bed. Breathing has been very important for her to help manage panic attacks. She used the lorazepam once or twice during situations where she wasn't able to calm her body down and when skills didn't work. Pt is having less uncontrollable panic attacks and she isn't feeling as out of control with them. She has used mints to help her to manage panic and intense anxiety. Pt learned that they will get a new position at work.     Side Effects:  Mood: feel like it is stable    Therapist: she will start family therapy with her daughter,   [facility] recommendations: use ice and lemon drops to curve intense anxiety       Progress on Treatment Objective(s) / Homework:  Satisfactory progress -  ACTION (Actively working towards change); Intervened by reinforcing change plan / affirming steps taken     CBT: Pt reports that they are using skills to manage their tension and anxiety. Pt used the lorazepam once or twice during situations where she wasn't able to calm her body.     [facility] praises and celebrates that pt is using skills to manage panic and intense anxiety. [facility] recommends pt to use ice and lemon drops or mints to curve intense anxiety.     Motivational Interviewing      MI Intervention: Expressed Empathy/Understanding, Supported Autonomy, Collaboration, Evocation, Permission to raise concern or advise, Open-ended questions, Reflections: simple and complex, Change talk (evoked), and Reframe     Change Talk Expressed by the Patient: Committment to change Activation Taking steps    Provider Response to Change Talk: E - Evoked more info from patient about behavior change, A - Affirmed patient's thoughts, decisions, or attempts at behavior change, R - Reflected patient's change talk, and S - Summarized patient's change talk statements    Also provided psychoeducation about behavioral health condition, symptoms, and treatment options    Assessments completed prior to visit:  The following assessments were completed by patient for this visit:  GAD2:       11/21/2023     7:30 AM 4/15/2024     9:52 AM 6/10/2024     9:32 AM 8/22/2024     6:50 AM   PARIS-2   Feeling nervous, anxious, or on edge 3 3 3 1   Not being able to stop or control worrying 3 1 1 1   PARIS-2 Total Score 6    6 4 4    4 2    2    2    2     PHQ2:       8/22/2024     6:50 AM 5/24/2024     3:10 PM 4/15/2024     9:50 AM 4/4/2024    10:02 AM 3/14/2024     3:40 PM 1/26/2023     2:48 PM   PHQ-2 ( 1999 [employer])   Q1: Little interest or pleasure in doing things 1 2 1 1 2 2   Q2: Feeling down, depressed or hopeless 1 3 1 2 2 3   PHQ-2 Score 2 5 2 3 4 5   Q1: Little interest or pleasure in doing things Several days More than half the days   More than half  the days More than half the days   Q2: Feeling down, depressed or hopeless Several days Nearly every day   More than half the days Nearly every day   PHQ-2 Score 2 5   4 5     PROMIS 10-Global Health (only subscores and total score):       11/21/2023     8:11 AM 4/15/2024     9:55 AM   PROMIS-10 Scores Only   Global Mental Health Score 7 8   Global Physical Health Score 16 16   PROMIS TOTAL - SUBSCORES 23 24   Will be completed next visit due to time constraints.     Care Plan review completed: Yes    Medication Review:  Changes to psychiatric medications, see updated Medication List in EPIC.     Medication Compliance:  Yes    Changes in Health Issues:   None reported    Chemical Use Review:   Substance Use: Chemical use reviewed, no active concerns identified      Tobacco Use: No current tobacco use.      Assessment: Current Emotional / Mental Status (status of significant symptoms):  Risk status (Self / Other harm or suicidal ideation)  Patient denies a history of suicidal ideation, suicide attempts, self-injurious behavior, homicidal ideation, homicidal behavior, and and other safety concerns  Patient denies current fears or concerns for personal safety.  Patient denies current or recent suicidal ideation or behaviors.  Patient denies current or recent homicidal ideation or behaviors.  Patient denies current or recent self injurious behavior or ideation.  Patient denies other safety concerns.  A safety and risk management plan has not been developed at this time, however patient was encouraged to call [place] [phone] should there be a change in any of these risk factors.    Appearance:   Appropriate    Eye Contact:   Good   Psychomotor Behavior: Normal   Attitude:   Cooperative   Orientation:   All  Speech   Rate / Production: Normal    Volume:  Normal   Mood:    Anxious , improving  Affect:    Appropriate   Thought Content:  Clear   Thought Form:  Coherent  Logical   Insight:    Good     Diagnoses:  1.  Generalized anxiety disorder          Collateral Reports Completed:  Communicated with: Vikas Whittington M.D.     Plan: (Homework, other):  Patient was given information about behavioral services and encouraged to schedule a follow up appointment with the clinic [facility] in 1 month.  Patient was also given information about mental health symptoms and treatment options   CD Recommendations: No indications of CD issues.     Michelle Moran, LICSW    ______________________________________________________________________    Integrated Primary Care Behavioral Health Treatment Plan    Patient's Name: Adela Stone  YOB: 1970    Date of Creation: 8/20/2024  Date Treatment Plan Last Reviewed/Revised: 8/20/2024    DSM5 Diagnoses: 300.02 (F41.1) Generalized Anxiety Disorder  Psychosocial / Contextual Factors: children, has many things on their to do list, enjoys [employer], is mindful  PROMIS (reviewed every 90 days):    PROMIS-10 Scores      11/21/2023     8:11 AM 4/15/2024     9:55 AM   PROMIS-10 Scores Only   Global Mental Health Score 7 8   Global Physical Health Score 16 16   PROMIS TOTAL - SUBSCORES 23 24         Referral / Collaboration:  Referral to another professional/service is not indicated at this time.    Anticipated number of session for this episode of care: 5-6  Anticipation frequency of session: Monthly  Anticipated Duration of each session: 16-37 minutes  Treatment plan will be reviewed in 90 days or when goals have been changed.   MeasurableTreatment Goal(s) related to diagnosis / functional impairment(s)  Goal 1: Patient will work with providers to manage anxiety symptoms    I will know I've met my goal when I have been using breathing techniques and have been trying to be aware of the tension.      Objective #A (Patient Action)  Patient will attend all appointments, take medication as prescribed.  Status: New - Date: 8/22/2024    Intervention(s)  [facility] will use CBT.     Objective #B  Patient will consider  all recommendations offered.  Status: New - Date: 8/22/2024     Intervention(s)  [facility] will educate patient on treatment options and teach skills through DBT and CBT..       Patient has reviewed and agreed to the above plan.      DAWNA Orozco  August 22, 2024

## 2024-08-22 ENCOUNTER — VIRTUAL VISIT (OUTPATIENT)
Dept: BEHAVIORAL HEALTH | Facility: CLINIC | Age: 54
End: 2024-08-22
Payer: COMMERCIAL

## 2024-08-22 ENCOUNTER — VIRTUAL VISIT (OUTPATIENT)
Dept: PSYCHIATRY | Facility: CLINIC | Age: 54
End: 2024-08-22
Payer: COMMERCIAL

## 2024-08-22 DIAGNOSIS — F41.1 GENERALIZED ANXIETY DISORDER: Primary | ICD-10-CM

## 2024-08-22 DIAGNOSIS — F41.1 GENERALIZED ANXIETY DISORDER: ICD-10-CM

## 2024-08-22 PROCEDURE — 99214 OFFICE O/P EST MOD 30 MIN: CPT | Mod: 95 | Performed by: PSYCHIATRY & NEUROLOGY

## 2024-08-22 PROCEDURE — 90832 PSYTX W PT 30 MINUTES: CPT | Mod: 95 | Performed by: COUNSELOR

## 2024-08-22 RX ORDER — DESVENLAFAXINE 25 MG/1
25 TABLET, EXTENDED RELEASE ORAL DAILY
Qty: 90 TABLET | Refills: 0 | Status: SHIPPED | OUTPATIENT
Start: 2024-08-22

## 2024-08-22 NOTE — PROGRESS NOTES
Virtual Visit Details    Type of service:  Video Visit     Originating Location (pt. Location): Home    Distant Location (provider location):  Off-site  Platform used for Video Visit: [facility] Psychiatry Consult Note    IDENTIFICATION   Name: Adela Stone   : 1970/54 year old      Sex:    @ female          Telemedicine Visit: The patient's condition can be safely assessed and treated via synchronous audio and visual telemedicine encounter.        Face to Face/patient Contact total time: 13 minutes  Pre Charting time: 1 minutes; Post charting time, communication and other activities: 1 minutes; Total time 15 minutes  7:20 AM          SUBJECTIVE     History of Present Illness    The patient, Yana Stone, a 54-year-old female, presented with a primary diagnosis of generalized anxiety disorder. She reported that her anxiety and mood have been stable recently. However, she expressed a desire to wean off her current medication and try something else, but she is not ready to do so at this moment due to multiple ongoing issues in her life.    She mentioned that she has been using a medication on an as-needed basis, approximately twice in the past month, when she feels out of control. She finds this medication helpful and plans to continue using it as needed.    Yana also reported taking Propranolol, but she expressed concerns about how she might react to this medication. She mentioned that she needs to test it on a day when nothing is going on to ensure she does not have an adverse reaction.    She described experiencing social anxiety, which leads her to avoid certain situations and interactions with people. This anxiety is particularly heightened when she has to do something for her daughter or interact with people in general. She mentioned that this anxiety feels irrational and is hard for her to explain.    Yana also mentioned that she has been using a sleep aid, but the timing of its use  changes due to other factors in her life. She reported no issues with this medication.    She expressed interest in trying acupuncture as a potential treatment for her anxiety but has not yet scheduled an appointment due to other commitments.    Regarding her current medications, Yana requested a refill for one of them. She also discussed the possibility of reducing the dosage of this medication gradually when things are more stable in her life.    Overall, Yana seems committed to managing her anxiety and is open to exploring different treatment options. However, she also acknowledges the challenges posed by her current life circumstances and the need for careful management of her medication regimen.             The following assessments were completed by patient for this visit:  PROMIS 10-Global Health (only subscores and total score):       11/21/2023     8:11 AM 4/15/2024     9:55 AM   PROMIS-10 Scores Only   Global Mental Health Score 7 8   Global Physical Health Score 16 16   PROMIS TOTAL - SUBSCORES 23 24             3/14/2024     3:40 PM 4/4/2024    10:02 AM 5/24/2024     3:09 PM   PHQ   PHQ-9 Total Score 10 11 13   Q9: Thoughts of better off dead/self-harm past 2 weeks Not at all Not at all Not at all          4/15/2024     9:52 AM 5/24/2024     3:09 PM 6/10/2024     9:32 AM   PARIS-7 SCORE   Total Score 9 (mild anxiety) 16 (severe anxiety) 10 (moderate anxiety)   Total Score 9 16 10    10        OBJECTIVE     Vital Signs:   LMP  (LMP Unknown)     Labs:    Results                    Current Medications:  Current Outpatient Medications   Medication Sig Dispense Refill    desvenlafaxine succinate (PRISTIQ) 25 MG 24 hr tablet Take 1 tablet (25 mg) by mouth daily. 90 tablet 0    LORazepam (ATIVAN) 0.5 MG tablet Take one daily as needed for severe anxiety 30 tablet 0    propranolol (INDERAL) 20 MG tablet Take one tablet twice a day as needed for anxiety. Take 30-60 minutes ahead of an anxiety provoking event. 30  tablet 2    lisinopril (ZESTRIL) 10 MG tablet Take 1 tablet (10 mg) by mouth daily 90 tablet 1    lisinopril (ZESTRIL) 10 MG tablet Take 1 tablet (10 mg) by mouth daily 90 tablet 0     No current facility-administered medications for this visit.            ADDED HISTORY   New job responsibilities/role in September with current employer.     The patient experiences social anxiety, which leads her to avoid certain situations and interactions with people. She describes this as an irrational anxiety that persists regardless of the situation. She also mentions that she has been using a tool to help manage her anxiety, which she has found helpful.    Physical exam    Physical Exam    - Anxiety, depression, affect:  - Speech and language:  - Insight and judgment:  - Alert and orientation to person, place and situation:  - SI: None  - HI: None  - AH: None  - VH: None           MENTAL STATUS EXAMINATION:   Attitude: Cooperative  Oriented to: Grossly person place and time  Attention Span and Concentration: Good  Speech: Within normal limits  Language: No abnormalities  Mood:  Fair  Affect: euthymic  Associations:  no loose associations  Thought Process:  logical, linear and goal oriented  Thought Content: No evidence of delusions or suicidal or homicidal ideation plan or intent  Memory: Grossly intact  Fund of Knowledge: Good  Insight:  good  Judgment:  intact, adequate for safety  Impulse Control:  intact        DIAGNOSES:   Generalized anxiety disorder, with panic attacks  Social Anxiety Disorder      ASSESSMENT:   Patient dealing with generalized anxiety symptoms, history of panic.  Symptoms are in partial remission with Pristiq.  Generally medication management has had efficacy, does prefer a holistic approach to treatment.  Discussed risks and benefits of medication management versus general holistic treatment.  Referred for integrated medicine.  Add buspirone as potential replacement for Pristiq and bridge to holistic  intervention if an effective treatment is found.    Today Adela Stone reports no suicidal ideations. In addition, she has notable risk factors for self-harm, including anxiety. However, risk is mitigated by commitment to family and absence of past attempts. Therefore, based on all available evidence including the factors cited above, she does not appear to be at imminent risk for self-harm, does not meet criteria for a 72-hr hold, and therefore remains appropriate for ongoing outpatient level of care.       PLAN:     Patient advised of consultative model. Patient will continue to be seen for ongoing consultation and stabilization.  Does not meet criteria for involuntary treatment or hospitalization  Continue Pristiq 50 mg daily => 4/15/24 25 mg daily efficacy => 25 mg every other day and 12.5 mg every other day-Risks, benefits and alternatives discussed.  Patient provides verbal consent to treatment.  Long-term considerations discussed.  On stability with new role and circumstances - 12.5 mg daily x 4 weeks then 12.5 mg every other day x 2-4 weeks then DC  Preference is to stop desvenlafaxine entirely before starting new trial  HOLD buspirone 11/21/2023 5 mg p.o. twice daily x 5 days then 10 mg p.o. twice daily-Risks, benefits and alternatives discussed.  Patient provides verbal consent to treatment.  Propranolol 4/15/24 20 mg daily as needed anxiety, take 30 to 60 minutes before an anxiety provoking event -Risks, benefits and alternatives discussed.  Patient provides verbal consent to treatment. To try this week without event today and with event tomorrow 8/23/24  Lorazepam 0.5 mg daily prn panic efficacy with rare use-Risks, benefits and alternatives discussed.  Patient provides verbal consent to treatment.  Pending sleep study  Provided referral/resource in for integrative medicine; referred for acupuncture; has not scheduled yet  Her long-term plan is to go for naturalistic treatment, was advised of pros and  cons of this approach        Assessment & Plan     Assessment  The patient's generalized anxiety disorder appears to be stable at this time. She has expressed a desire to wean off her current medication, but due to multiple ongoing issues, she is not ready to make this change at the moment. She is using a tool to help manage her anxiety, which she has found helpful. The patient also experiences social anxiety, which leads her to avoid certain situations and interactions with people.    Plan  - Continue current medication regimen for now due to multiple ongoing issues.  - Consider weaning off current medication when things are more stable.  - Continue using tool for managing anxiety.  - Consider taking Propranolol before entering situations that may trigger social anxiety.  - Consider acupuncture as a potential treatment option.    Prescription  Refill on the 25mg dose of unspecified medication for anxiety    Appointments  - Follow-up appointment scheduled for four months from now.  - Patient informed that I will be gone the first two weeks of September but backup providers will be available if needed.           Administrative Billing:   Time spent with patient was greater than 50% of time and/or significant time was spent in counseling and coordination of care regarding above diagnoses and treatment plan. Pre charting time and post charting time/documentation/coordination are done on date of service.      Signed:   Vikas Whittington M.D.  [facility] Psychiatry Service    Disclaimer: This note consists of symbols derived from keyboarding, dictation and/or voice recognition software. As a result, there may be errors in the script that have gone undetected. Please consider this when interpreting information found in this chart.    Consent was obtained from the patient to use an AI documentation tool in the creation of this note.     eoc

## 2024-08-22 NOTE — NURSING NOTE
[address]  [place] [address]    Is the patient currently in the state of [place]? YES    Visit mode:VIDEO    If the visit is dropped, the patient can be reconnected by: VIDEO VISIT: Text to cell phone:   Telephone Information:   Mobile [phone]       Will anyone else be joining the visit? NO  (If patient encounters technical issues they should call [phone])    How would you like to obtain your AVS? MyChart    Are changes needed to the allergy or medication list? Pt stated no changes to allergies and Pt stated no med changes    Are refills needed on medications prescribed by this physician? YES    Rooming Documentation:  Questionnaire(s) completed      Reason for visit: RECHECK    Pam PORTER

## 2024-08-23 ENCOUNTER — VIRTUAL VISIT (OUTPATIENT)
Dept: PSYCHOLOGY | Facility: CLINIC | Age: 54
End: 2024-08-23
Payer: COMMERCIAL

## 2024-08-23 ENCOUNTER — FCC EXTENDED DOCUMENTATION (OUTPATIENT)
Dept: PSYCHOLOGY | Facility: CLINIC | Age: 54
End: 2024-08-23
Payer: COMMERCIAL

## 2024-08-23 DIAGNOSIS — F43.23 ADJUSTMENT DISORDER WITH MIXED ANXIETY AND DEPRESSED MOOD: Primary | ICD-10-CM

## 2024-08-23 PROCEDURE — 90837 PSYTX W PT 60 MINUTES: CPT | Mod: 95 | Performed by: SOCIAL WORKER

## 2024-08-23 ASSESSMENT — COLUMBIA-SUICIDE SEVERITY RATING SCALE - C-SSRS
6. HAVE YOU EVER DONE ANYTHING, STARTED TO DO ANYTHING, OR PREPARED TO DO ANYTHING TO END YOUR LIFE?: NO
1. HAVE YOU WISHED YOU WERE DEAD OR WISHED YOU COULD GO TO SLEEP AND NOT WAKE UP?: NO
2. HAVE YOU ACTUALLY HAD ANY THOUGHTS OF KILLING YOURSELF?: NO
ATTEMPT LIFETIME: NO
TOTAL  NUMBER OF INTERRUPTED ATTEMPTS LIFETIME: NO
TOTAL  NUMBER OF ABORTED OR SELF INTERRUPTED ATTEMPTS LIFETIME: NO

## 2024-08-23 ASSESSMENT — ANXIETY QUESTIONNAIRES
3. WORRYING TOO MUCH ABOUT DIFFERENT THINGS: NEARLY EVERY DAY
IF YOU CHECKED OFF ANY PROBLEMS ON THIS QUESTIONNAIRE, HOW DIFFICULT HAVE THESE PROBLEMS MADE IT FOR YOU TO DO YOUR WORK, TAKE CARE OF THINGS AT HOME, OR GET ALONG WITH OTHER PEOPLE: SOMEWHAT DIFFICULT
2. NOT BEING ABLE TO STOP OR CONTROL WORRYING: SEVERAL DAYS
1. FEELING NERVOUS, ANXIOUS, OR ON EDGE: NEARLY EVERY DAY
5. BEING SO RESTLESS THAT IT IS HARD TO SIT STILL: NOT AT ALL
6. BECOMING EASILY ANNOYED OR IRRITABLE: SEVERAL DAYS
GAD7 TOTAL SCORE: 12
7. FEELING AFRAID AS IF SOMETHING AWFUL MIGHT HAPPEN: MORE THAN HALF THE DAYS
GAD7 TOTAL SCORE: 12

## 2024-08-23 ASSESSMENT — PATIENT HEALTH QUESTIONNAIRE - PHQ9
SUM OF ALL RESPONSES TO PHQ QUESTIONS 1-9: 7
10. IF YOU CHECKED OFF ANY PROBLEMS, HOW DIFFICULT HAVE THESE PROBLEMS MADE IT FOR YOU TO DO YOUR WORK, TAKE CARE OF THINGS AT HOME, OR GET ALONG WITH OTHER PEOPLE: SOMEWHAT DIFFICULT
SUM OF ALL RESPONSES TO PHQ QUESTIONS 1-9: 7
5. POOR APPETITE OR OVEREATING: MORE THAN HALF THE DAYS
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
## 2024-08-29 ENCOUNTER — VIRTUAL VISIT (OUTPATIENT)
Dept: PSYCHOLOGY | Facility: CLINIC | Age: 54
End: 2024-08-29
Payer: COMMERCIAL

## 2024-08-29 DIAGNOSIS — F41.1 GAD (GENERALIZED ANXIETY DISORDER): ICD-10-CM

## 2024-08-29 DIAGNOSIS — F43.23 ADJUSTMENT DISORDER WITH MIXED ANXIETY AND DEPRESSED MOOD: Primary | ICD-10-CM

## 2024-08-29 PROCEDURE — 90791 PSYCH DIAGNOSTIC EVALUATION: CPT | Mod: 95 | Performed by: SOCIAL WORKER

## 2024-08-31 NOTE — PROGRESS NOTES
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 identify any lethal methods     Optional: What is most important to me and worth living for?:   \" Myself, my 3 children, my family, my friends, pet\"     Chris Safety Plan. Medina Posadas and Ronnell Kinsey. Used with permission of the authors.       Provider Name/ Credentials: DARREL Granger; [facility]   August 29, 2024  "

## 2024-09-17 ENCOUNTER — OFFICE VISIT (OUTPATIENT)
Dept: FAMILY MEDICINE | Facility: CLINIC | Age: 54
End: 2024-09-17
Payer: COMMERCIAL

## 2024-09-17 VITALS
DIASTOLIC BLOOD PRESSURE: 88 MMHG | HEART RATE: 73 BPM | OXYGEN SATURATION: 98 % | BODY MASS INDEX: 29.26 KG/M2 | RESPIRATION RATE: 12 BRPM | TEMPERATURE: 98.1 F | HEIGHT: 62 IN | SYSTOLIC BLOOD PRESSURE: 131 MMHG | WEIGHT: 159 LBS

## 2024-09-17 DIAGNOSIS — I10 PRIMARY HYPERTENSION: ICD-10-CM

## 2024-09-17 DIAGNOSIS — E55.9 VITAMIN D DEFICIENCY: ICD-10-CM

## 2024-09-17 DIAGNOSIS — Z00.00 HEALTH CARE MAINTENANCE: ICD-10-CM

## 2024-09-17 DIAGNOSIS — E66.3 OVERWEIGHT WITH BODY MASS INDEX (BMI) OF 29 TO 29.9 IN ADULT: Primary | ICD-10-CM

## 2024-09-17 DIAGNOSIS — Z12.31 ENCOUNTER FOR SCREENING MAMMOGRAM FOR MALIGNANT NEOPLASM OF BREAST: ICD-10-CM

## 2024-09-17 DIAGNOSIS — H91.93 DECREASED HEARING OF BOTH EARS: ICD-10-CM

## 2024-09-17 DIAGNOSIS — E78.2 MIXED HYPERLIPIDEMIA: ICD-10-CM

## 2024-09-17 DIAGNOSIS — Z63.9 DIFFICULTY WITH FAMILY: ICD-10-CM

## 2024-09-17 DIAGNOSIS — Z13.220 SCREENING, LIPID: ICD-10-CM

## 2024-09-17 DIAGNOSIS — F40.10 SOCIAL ANXIETY DISORDER: ICD-10-CM

## 2024-09-17 DIAGNOSIS — R29.818 SUSPECTED SLEEP APNEA: ICD-10-CM

## 2024-09-17 DIAGNOSIS — R73.09 ELEVATED GLUCOSE: ICD-10-CM

## 2024-09-17 DIAGNOSIS — M40.00 ACQUIRED POSTURAL KYPHOSIS: ICD-10-CM

## 2024-09-17 DIAGNOSIS — Z00.00 ROUTINE GENERAL MEDICAL EXAMINATION AT A HEALTH CARE FACILITY: ICD-10-CM

## 2024-09-17 DIAGNOSIS — N95.0 POSTMENOPAUSAL BLEEDING: ICD-10-CM

## 2024-09-17 DIAGNOSIS — Z78.0 MENOPAUSE: ICD-10-CM

## 2024-09-17 DIAGNOSIS — R00.2 PALPITATIONS: ICD-10-CM

## 2024-09-17 LAB
ERYTHROCYTE [DISTWIDTH] IN BLOOD BY AUTOMATED COUNT: 13 % (ref 10–15)
HCT VFR BLD AUTO: 39.3 % (ref 35–47)
HGB BLD-MCNC: 13.1 G/DL (ref 11.7–15.7)
MCH RBC QN AUTO: 30.3 PG (ref 26.5–33)
MCHC RBC AUTO-ENTMCNC: 33.3 G/DL (ref 31.5–36.5)
MCV RBC AUTO: 91 FL (ref 78–100)
PLATELET # BLD AUTO: 246 10E3/UL (ref 150–450)
RBC # BLD AUTO: 4.33 10E6/UL (ref 3.8–5.2)
WBC # BLD AUTO: 5.2 10E3/UL (ref 4–11)

## 2024-09-17 PROCEDURE — 36415 COLL VENOUS BLD VENIPUNCTURE: CPT | Performed by: FAMILY MEDICINE

## 2024-09-17 PROCEDURE — 99214 OFFICE O/P EST MOD 30 MIN: CPT | Mod: 25 | Performed by: FAMILY MEDICINE

## 2024-09-17 PROCEDURE — 96127 BRIEF EMOTIONAL/BEHAV ASSMT: CPT | Performed by: FAMILY MEDICINE

## 2024-09-17 PROCEDURE — 80053 COMPREHEN METABOLIC PANEL: CPT | Performed by: FAMILY MEDICINE

## 2024-09-17 PROCEDURE — 82306 VITAMIN D 25 HYDROXY: CPT | Performed by: FAMILY MEDICINE

## 2024-09-17 PROCEDURE — 85027 COMPLETE CBC AUTOMATED: CPT | Performed by: FAMILY MEDICINE

## 2024-09-17 PROCEDURE — 99396 PREV VISIT EST AGE 40-64: CPT | Performed by: FAMILY MEDICINE

## 2024-09-17 PROCEDURE — 80061 LIPID PANEL: CPT | Performed by: FAMILY MEDICINE

## 2024-09-17 RX ORDER — LISINOPRIL 10 MG/1
10 TABLET ORAL DAILY
Qty: 90 TABLET | Refills: 3 | Status: SHIPPED | OUTPATIENT
Start: 2024-09-17

## 2024-09-17 SDOH — SOCIAL STABILITY - SOCIAL INSECURITY: PROBLEM RELATED TO PRIMARY SUPPORT GROUP, UNSPECIFIED: Z63.9

## 2024-09-17 ASSESSMENT — ANXIETY QUESTIONNAIRES
7. FEELING AFRAID AS IF SOMETHING AWFUL MIGHT HAPPEN: SEVERAL DAYS
GAD7 TOTAL SCORE: 7
GAD7 TOTAL SCORE: 7
8. IF YOU CHECKED OFF ANY PROBLEMS, HOW DIFFICULT HAVE THESE MADE IT FOR YOU TO DO YOUR WORK, TAKE CARE OF THINGS AT HOME, OR GET ALONG WITH OTHER PEOPLE?: SOMEWHAT DIFFICULT
GAD7 TOTAL SCORE: 7

## 2024-09-17 ASSESSMENT — PATIENT HEALTH QUESTIONNAIRE - PHQ9
10. IF YOU CHECKED OFF ANY PROBLEMS, HOW DIFFICULT HAVE THESE PROBLEMS MADE IT FOR YOU TO DO YOUR WORK, TAKE CARE OF THINGS AT HOME, OR GET ALONG WITH OTHER PEOPLE: SOMEWHAT DIFFICULT
SUM OF ALL RESPONSES TO PHQ QUESTIONS 1-9: 8
SUM OF ALL RESPONSES TO PHQ QUESTIONS 1-9: 8

## 2024-09-17 NOTE — ASSESSMENT & PLAN NOTE
Hyperlipidemia - discussed statins and 10 yr ASCVD score. And offered coronary calcium score.   Info put in chart on statins. She would like coronary calcium score.

## 2024-09-17 NOTE — ASSESSMENT & PLAN NOTE
Difficulty with family.   Daughter is 19 - currently hospitalized with psychosis.   See social anxiety in prob list - Adela does have psychiatrist and psychologist.

## 2024-09-17 NOTE — ASSESSMENT & PLAN NOTE
6/26/2024 eber benign findings only - will continue to watch and wait, she says this seems anxiety related.

## 2024-09-17 NOTE — ASSESSMENT & PLAN NOTE
Improving. Overweight with BMI of 29.09.  She is congratulated on her recent weight loss and healthy lifestyle is encouraged.

## 2024-09-17 NOTE — PROGRESS NOTES
Preventive Care Visit  [facility]  Paty Honeycutt MD, Family Medicine  Sep 17, 2024    In addition to the preventive service, I spent 25 minutes discussing the patient's hypertension management, vitamin d deficiency, hyperlipidemia/ ct coronary calcium score discussion, and more.         Problem List Items Addressed This Visit          Nervous and Auditory    Decreased hearing of both ears     Decreased hearing. Audiology referral placed.          Relevant Orders    Adult Audiology  Referral       Digestive    Vitamin D deficiency     Vitamin D level ordered and will titrate supplement as needed.         Relevant Orders    Vitamin D Deficiency       Endocrine    Mixed hyperlipidemia     Hyperlipidemia - discussed statins and 10 yr ASCVD score. And offered coronary calcium score.   Info put in chart on statins. She would like coronary calcium score.          Relevant Orders    CT Coronary Calcium Scan       Circulatory    Primary hypertension     Hypertension - well controlled.  Lisinopril 10 mg p.o. daily Rx refilled.  Monitoring lab ordered.            Relevant Medications    lisinopril (ZESTRIL) 10 MG tablet    Other Relevant Orders    CBC with platelets    Comprehensive metabolic panel (BMP + Alb, Alk Phos, ALT, AST, Total. Bili, TP)    RESOLVED: Palpitations     6/26/2024 ziopatch benign findings only - will continue to watch and wait, she says this seems anxiety related.             Musculoskeletal and Integumentary    Acquired postural kyphosis     Postural kyphosis, physical therapy order placed.          Relevant Orders    Physical Therapy  Referral       Urinary    RESOLVED: Postmenopausal bleeding     She had biopsy that was normal. And no more bleeding.            Behavioral    Social anxiety disorder     Social anxiety disorder and generalized anxiety disorder.   Managed by Dr. Whittington.   Continue pristiq, lorazepam and propranolol.    Seeing a counselor through  [facility]. Geraldo Chow - who she sees twice a month.             Other    Health care maintenance     Contraception - menopause.   Recommended vaccines: COVID, flu and hepatitis B  Pap: Normal Pap and HPV in 2023- repeat 2028  Mammo: Normal mammogram 3/2023  Colonoscopy: attempted 12/23/2019 - but aborted due to significant oxygen desaturations.  Cologuard negative 2023 - plan repeat in 2026  Std testing desired:  offered  Osteoporosis prevention discussed.  vitamin d levels ordered. Recommend daily calcium and vitamin d intake to keep good bone health. Recommend weight bearing exercise, no tobacco, and limit alcohol  dexa -recommended in the setting of menopause  Recommend sunscreen, exercise, & healthy diet.  Offered cbc, cmp, lipids and asked what other testing she  desires today  I have had an Advance Directives discussion with the patient. She would want her brother Nino.   Body mass index is 29.08 kg/m .   mychart active.          Suspected sleep apnea     She states she did do eval and is planning to do sleep study.         Overweight with body mass index (BMI) of 29 to 29.9 in adult - Primary     Improving. Overweight with BMI of 29.09.  She is congratulated on her recent weight loss and healthy lifestyle is encouraged.         Difficulty with family     Difficulty with family.   Daughter is 19 - currently hospitalized with psychosis.   See social anxiety in prob list - Adela does have psychiatrist and psychologist.           Other Visit Diagnoses       Menopause        Relevant Orders    DX Bone Density    Encounter for screening mammogram for malignant neoplasm of breast        Relevant Orders    MA Screen Bilateral w/Kulwinder    Screening, lipid        Relevant Orders    Lipid Profile    Routine general medical examination at a health care facility                 Emely Millan is a 54 year old, presenting for the following:  No chief complaint on file.         Health Care Directive  Patient does  not have a Health Care Directive or Living Will: Discussed advance care planning with patient; however, patient declined at this time.          9/17/2024   General Health   How would you rate your overall physical health? Good   Feel stress (tense, anxious, or unable to sleep) Patient declined            9/17/2024   Nutrition   Three or more servings of calcium each day? (!) I DON'T KNOW - info put in chart   Diet: Regular (no restrictions)   How many servings of fruit and vegetables per day? 4 or more   How many sweetened beverages each day? 0-1            9/17/2024   Exercise   Days per week of moderate/strenous exercise 0 days      (!) EXERCISE CONCERN      9/17/2024   Social Factors   Frequency of gathering with friends or relatives Once a week   Worry food won't last until get money to buy more No   Food not last or not have enough money for food? No   Do you have housing? (Housing is defined as stable permanent housing and does not include staying ouside in a car, in a tent, in an abandoned building, in an overnight shelter, or couch-surfing.) Yes   Are you worried about losing your housing? No   Lack of transportation? No   Unable to get utilities (heat,electricity)? No            9/17/2024   Fall Risk   Fallen 2 or more times in the past year? No   Trouble with walking or balance? No             9/17/2024   Dental   Dentist two times every year? Yes            9/17/2024   TB Screening   Were you born outside of the [place]? No          Today's PHQ-9 Score:       9/17/2024    10:26 AM   PHQ-9 SCORE   PHQ-9 Total Score MyChart 8 (Mild depression)   PHQ-9 Total Score 8         9/17/2024   Substance Use   Alcohol more than 3/day or more than 7/wk No   Do you use any other substances recreationally? No        Social History     Tobacco Use    Smoking status: Never     Passive exposure: Never    Smokeless tobacco: Never   Vaping Use    Vaping status: Never Used   Substance Use Topics    Alcohol use: Not Currently      Comment: rarely    Drug use: No         3/3/2023   LAST FHS-7 RESULTS   1st degree relative breast or ovarian cancer No   Any relative bilateral breast cancer No   Any male have breast cancer No   Any ONE woman have BOTH breast AND ovarian cancer No   Any woman with breast cancer before 50yrs No   2 or more relatives with breast AND/OR ovarian cancer No   2 or more relatives with breast AND/OR bowel cancer No        Mammogram Screening - Mammogram every 1-2 years updated in Health Maintenance based on mutual decision making        9/17/2024   STI Screening   New sexual partner(s) since last STI/HIV test? No        History of abnormal Pap smear: No - age 30-64 HPV with reflex Pap every 5 years recommended        Latest Ref Rng & Units 9/12/2023     8:58 AM 1/28/2021    10:19 AM 7/30/2018    11:19 AM   PAP / HPV   PAP  Negative for Intraepithelial Lesion or Malignancy (NILM)  Negative for squamous intraepithelial lesion or malignancy  Electronically signed by Hilda Rossi CT (ASCP) on 2/8/2021 at 11:55 AM    Negative for squamous intraepithelial lesion or malignancy  Electronically signed by Lizzie Mcneal CT (ASCP) on 8/3/2018 at 10:17 AM      HPV 16 DNA Negative Negative  Negative  Negative    HPV 18 DNA Negative Negative  Negative  Negative    Other HR HPV Negative Negative  Negative  Negative      ASCVD Risk   The 10-year ASCVD risk score (Melida BOYCE, et al., 2019) is: 2.7%    Values used to calculate the score:      Age: 54 years      Sex: Female      Is Non- : No      Diabetic: No      Tobacco smoker: No      Systolic Blood Pressure: 131 mmHg      Is BP treated: Yes      HDL Cholesterol: 57 mg/dL      Total Cholesterol: 201 mg/dL        Reviewed and updated as needed this visit by Provider   Tobacco  Allergies  Meds  Problems  Med Hx  Surg Hx  Fam Hx  Soc   Hx Sexual Activity          Objective    Exam  /88 (BP Location: Left arm, Patient Position: Left  "side, Cuff Size: Adult Large)   Pulse 73   Temp 98.1  F (36.7  C) (Oral)   Resp 12   Ht 1.575 m (5' 2\")   Wt 72.1 kg (159 lb)   LMP  (LMP Unknown)   SpO2 98%   BMI 29.08 kg/m     Estimated body mass index is 29.08 kg/m  as calculated from the following:    Height as of this encounter: 1.575 m (5' 2\").    Weight as of this encounter: 72.1 kg (159 lb).    Physical Exam          Signed Electronically by: Paty Honeycutt MD    Answers submitted by the patient for this visit:  Patient Health Questionnaire (Submitted on 9/17/2024)  If you checked off any problems, how difficult have these problems made it for you to do your work, take care of things at home, or get along with other people?: Somewhat difficult  PHQ9 TOTAL SCORE: 8  Patient Health Questionnaire (G7) (Submitted on 9/17/2024)  PARIS 7 TOTAL SCORE: 7    "

## 2024-09-17 NOTE — ASSESSMENT & PLAN NOTE
Contraception - menopause.   Recommended vaccines: COVID, flu and hepatitis B  Pap: Normal Pap and HPV in 2023- repeat 2028  Mammo: Normal mammogram 3/2023  Colonoscopy: attempted 12/23/2019 - but aborted due to significant oxygen desaturations.  Cologuard negative 2023 - plan repeat in 2026  Std testing desired:  offered  Osteoporosis prevention discussed.  vitamin d levels ordered. Recommend daily calcium and vitamin d intake to keep good bone health. Recommend weight bearing exercise, no tobacco, and limit alcohol  dexa -recommended in the setting of menopause  Recommend sunscreen, exercise, & healthy diet.  Offered cbc, cmp, lipids and asked what other testing she  desires today  I have had an Advance Directives discussion with the patient. She would want her brother Nino.   Body mass index is 29.08 kg/m .   ishan active.

## 2024-09-17 NOTE — ASSESSMENT & PLAN NOTE
Hypertension - well controlled.  Lisinopril 10 mg p.o. daily Rx refilled.  Monitoring lab ordered.

## 2024-09-17 NOTE — PATIENT INSTRUCTIONS
Patient Education   Preventive Care Advice   This is general advice given by our system to help you stay healthy. However, your care team may have specific advice just for you. Please talk to your care team about your preventive care needs.  Nutrition  Eat 5 or more servings of fruits and vegetables each day.  Try wheat bread, brown rice and whole grain pasta (instead of white bread, rice, and pasta).  Get enough calcium and vitamin D. Check the label on foods and aim for 100% of the RDA (recommended daily allowance).  Lifestyle  Exercise at least 150 minutes each week  (30 minutes a day, 5 days a week).  Do muscle strengthening activities 2 days a week. These help control your weight and prevent disease.  No smoking.  Wear sunscreen to prevent skin cancer.  Have a dental exam and cleaning every 6 months.  Yearly exams  See your health care team every year to talk about:  Any changes in your health.  Any medicines your care team has prescribed.  Preventive care, family planning, and ways to prevent chronic diseases.  Shots (vaccines)   HPV shots (up to age 26), if you've never had them before.  Hepatitis B shots (up to age 59), if you've never had them before.  COVID-19 shot: Get this shot when it's due.  Flu shot: Get a flu shot every year.  Tetanus shot: Get a tetanus shot every 10 years.  Pneumococcal, hepatitis A, and RSV shots: Ask your care team if you need these based on your risk.  Shingles shot (for age 50 and up)  General health tests  Diabetes screening:  Starting at age 35, Get screened for diabetes at least every 3 years.  If you are younger than age 35, ask your care team if you should be screened for diabetes.  Cholesterol test: At age 39, start having a cholesterol test every 5 years, or more often if advised.  Bone density scan (DEXA): At age 50, ask your care team if you should have this scan for osteoporosis (brittle bones).  Hepatitis C: Get tested at least once in your life.  STIs (sexually  transmitted infections)  Before age 24: Ask your care team if you should be screened for STIs.  After age 24: Get screened for STIs if you're at risk. You are at risk for STIs (including HIV) if:  You are sexually active with more than one person.  You don't use condoms every time.  You or a partner was diagnosed with a sexually transmitted infection.  If you are at risk for HIV, ask about PrEP medicine to prevent HIV.  Get tested for HIV at least once in your life, whether you are at risk for HIV or not.  Cancer screening tests  Cervical cancer screening: If you have a cervix, begin getting regular cervical cancer screening tests starting at age 21.  Breast cancer scan (mammogram): If you've ever had breasts, begin having regular mammograms starting at age 40. This is a scan to check for breast cancer.  Colon cancer screening: It is important to start screening for colon cancer at age 45.  Have a colonoscopy test every 10 years (or more often if you're at risk) Or, ask your provider about stool tests like a FIT test every year or Cologuard test every 3 years.  To learn more about your testing options, visit:   .  For help making a decision, visit:   https://bit.ly/se57645.  Prostate cancer screening test: If you have a prostate, ask your care team if a prostate cancer screening test (PSA) at age 55 is right for you.  Lung cancer screening: If you are a current or former smoker ages 50 to 80, ask your care team if ongoing lung cancer screenings are right for you.  For informational purposes only. Not to replace the advice of your health care provider. Copyright   2023 [facility]. All rights reserved. Clinically reviewed by the [facility] Transitions Program. [employer] 885731 - REV 01/24.  Learning About Depression Screening  What is depression screening?  Depression screening is a way to see if you have depression symptoms. It may be done by a doctor or counselor. It's often part of a routine  "checkup. That's because your mental health is just as important as your physical health.  Depression is a mental health condition that affects how you feel, think, and act. You may:  Have less energy.  Lose interest in your daily activities.  Feel sad and grouchy for a long time.  Depression is very common. It affects people of all ages.  Many things can lead to depression. Some people become depressed after they have a stroke or find out they have a major illness like cancer or heart disease. The death of a loved one or a breakup may lead to depression. It can run in families. Most experts believe that a combination of inherited genes and stressful life events can cause it.  What happens during screening?  You may be asked to fill out a form about your depression symptoms. You and the doctor will discuss your answers. The doctor may ask you more questions to learn more about how you think, act, and feel.  What happens after screening?  If you have symptoms of depression, your doctor will talk to you about your options.  Doctors usually treat depression with medicines or counseling. Often, combining the two works best. Many people don't get help because they think that they'll get over the depression on their own. But people with depression may not get better unless they get treatment.  The cause of depression is not well understood. There may be many factors involved. But if you have depression, it's not your fault.  A serious symptom of depression is thinking about death or suicide. If you or someone you care about talks about this or about feeling hopeless, get help right away.  It's important to know that depression can be treated. Medicine, counseling, and self-care may help.  Where can you learn more?  Go to https://www.[place].net/patiented  Enter T185 in the search box to learn more about \"Learning About Depression Screening.\"  Current as of: June 24, 2023  Content Version: 14.1 2006-2024 [employer] " [employer].   Care instructions adapted under license by your healthcare professional. If you have questions about a medical condition or this instruction, always ask your healthcare professional. [employer] disclaims any warranty or liability for your use of this information.

## 2024-09-17 NOTE — ASSESSMENT & PLAN NOTE
Addended by: GRISELDA ROSE CAP on: 1/22/2019 04:22 PM     Modules accepted: Orders     Social anxiety disorder and generalized anxiety disorder.   Managed by Dr. Whittington.   Continue pristiq, lorazepam and propranolol.    Seeing a counselor through [facility]. Geraldo Chow - who she sees twice a month.

## 2024-09-18 LAB
ALBUMIN SERPL BCG-MCNC: 4.5 G/DL (ref 3.5–5.2)
ALP SERPL-CCNC: 66 U/L (ref 40–150)
ALT SERPL W P-5'-P-CCNC: 20 U/L (ref 0–50)
ANION GAP SERPL CALCULATED.3IONS-SCNC: 10 MMOL/L (ref 7–15)
AST SERPL W P-5'-P-CCNC: 24 U/L (ref 0–45)
BILIRUB SERPL-MCNC: 0.5 MG/DL
BUN SERPL-MCNC: 13.1 MG/DL (ref 6–20)
CALCIUM SERPL-MCNC: 9.4 MG/DL (ref 8.8–10.4)
CHLORIDE SERPL-SCNC: 107 MMOL/L (ref 98–107)
CHOLEST SERPL-MCNC: 178 MG/DL
CREAT SERPL-MCNC: 0.86 MG/DL (ref 0.51–0.95)
EGFRCR SERPLBLD CKD-EPI 2021: 79 ML/MIN/1.73M2
FASTING STATUS PATIENT QL REPORTED: YES
FASTING STATUS PATIENT QL REPORTED: YES
GLUCOSE SERPL-MCNC: 104 MG/DL (ref 70–99)
HCO3 SERPL-SCNC: 26 MMOL/L (ref 22–29)
HDLC SERPL-MCNC: 53 MG/DL
LDLC SERPL CALC-MCNC: 110 MG/DL
NONHDLC SERPL-MCNC: 125 MG/DL
POTASSIUM SERPL-SCNC: 4.2 MMOL/L (ref 3.4–5.3)
PROT SERPL-MCNC: 7.6 G/DL (ref 6.4–8.3)
SODIUM SERPL-SCNC: 143 MMOL/L (ref 135–145)
TRIGL SERPL-MCNC: 73 MG/DL
VIT D+METAB SERPL-MCNC: 21 NG/ML (ref 20–50)

## 2024-10-01 ENCOUNTER — THERAPY VISIT (OUTPATIENT)
Dept: PHYSICAL THERAPY | Facility: CLINIC | Age: 54
End: 2024-10-01
Attending: FAMILY MEDICINE
Payer: COMMERCIAL

## 2024-10-01 DIAGNOSIS — M40.00 ACQUIRED POSTURAL KYPHOSIS: ICD-10-CM

## 2024-10-01 PROCEDURE — 97110 THERAPEUTIC EXERCISES: CPT | Mod: GP

## 2024-10-01 PROCEDURE — 97530 THERAPEUTIC ACTIVITIES: CPT | Mod: GP

## 2024-10-01 PROCEDURE — 97161 PT EVAL LOW COMPLEX 20 MIN: CPT | Mod: GP

## 2024-10-01 NOTE — PROGRESS NOTES
PHYSICAL THERAPY EVALUATION  Type of Visit: Evaluation       Fall Risk Screen:  Fall screen completed by: PT  Have you fallen 2 or more times in the past year?: No  Have you fallen and had an injury in the past year?: No  Is patient a fall risk?: No    Subjective       Presenting condition or subjective complaint: posture  Date of onset: 10/01/24    Relevant medical history: High blood pressure; Menopause; Mental Illness; Overweight   Dates & types of surgery:      Prior diagnostic imaging/testing results:       Prior therapy history for the same diagnosis, illness or injury: No      Prior Level of Function  Transfers:   Ambulation:   ADL:   IADL:     Living Environment  Social support: With family members   Type of home: House   Stairs to enter the home: Yes       Ramp: No   Stairs inside the home: Yes   Is there a railing: Yes     Help at home: None  Equipment owned:       Employment: Yes office  Hobbies/Interests:      Patient goals for therapy: stand straight and reduce hunchback    Physical Therapist Assessment    KEY PT FINDINGS:  1) Upper cross syndrome observation  2) Hypomobility of thoracic spine - moderate limitation into thoracic extension  3) Hypertonicity of posterior cervical musculature    Signs and symptoms are consistent with postural dysfunction.      Subjective History    Patient was referred by Paty Honeycutt for Acquired postural kyphosis [M40.00]   Referring provider plan:   Musculoskeletal and Integumentary      Acquired postural kyphosis       Postural kyphosis, physical therapy order placed.            Relevant Orders     Physical Therapy  Referral       PT Subjective:   Patient is a 54 year old female presenting to outpatient physical therapy with postural dysfunction in her upper back. Has been thinking about this for a few years as she notices she has poor posture and it's uncomfortable, also does not like the way it looks and doesn't want it to get worse. Does not have any  current pain. Did have PT in the past for her neck which has helped and gone away over time. Mentions that she is not very active and wants more activity.      PMH:   Patient Active Problem List   Diagnosis    Vitamin D deficiency    Rosacea    Health care maintenance    Primary hypertension    Social anxiety disorder    Suspected sleep apnea    Overweight with body mass index (BMI) of 29 to 29.9 in adult    Acquired postural kyphosis    Decreased hearing of both ears    Difficulty with family    Mixed hyperlipidemia     Medications:   Current Outpatient Medications   Medication Sig Dispense Refill    desvenlafaxine succinate (PRISTIQ) 25 MG 24 hr tablet Take 1 tablet (25 mg) by mouth daily. 90 tablet 0    lisinopril (ZESTRIL) 10 MG tablet Take 1 tablet (10 mg) by mouth daily. 90 tablet 3    LORazepam (ATIVAN) 0.5 MG tablet Take one daily as needed for severe anxiety 30 tablet 0    propranolol (INDERAL) 20 MG tablet Take one tablet twice a day as needed for anxiety. Take 30-60 minutes ahead of an anxiety provoking event. 30 tablet 2     No current facility-administered medications for this visit.           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  Referral recommended? No  Additional Considerations: None     Patient Goals for Physical Therapy: Increased Core Strength, Better Posture awareness         Objective   Objective Examination    Posture/Observation: Rounded shoulders, Forward head, Thoracic kyphosis increased    Shoulder Screen: Rounded shoulders, Forward head    Thoracic Screen: Mod limit in EXT, min limit bilateral rotation    Palpation:  Hypertonicity noted of thoracic paraspinals          Neural Tension:    ULTT1 (median): Not Tested  ULTT2b (radial): Not Tested  ULTT3 (ulnar): Not  Tested    Joint Mobility:   T-Spine Spring Testing: Hypomobile - mild discomfort noted at upper thoracic segments T4-T1 (upper cross syndrome)     Strength Testing:  Resisted Isometrics C-Spine: N/A  Latissimus Dorsi MMT: 4/5 shanta  Middle Trapezius/Rhomboids MMT: 4/5 shanta  Lower Trapezius MMT: 3+/5 shanta    Flexibility: tight latissimus dorsi, tight pectoralis minor bilateral, hypertonic upper trap and levator scapulae bilateral    Craniocervical Flexion Endurance Test: N/A    Sensorimotor testing:   Cervical joint position sense (Norms <7.1 cm): N/A        Assessment & Plan   CLINICAL IMPRESSIONS  Medical Diagnosis: Acquired postural kyphosis (M40.00)    Treatment Diagnosis: Acquired postural kyphosis   Impression/Assessment: Patient is a 54 year old female with upper back and postural complaints.  The following significant findings have been identified: Decreased ROM/flexibility, Decreased joint mobility, Decreased strength, Impaired muscle performance, Decreased activity tolerance, and Impaired posture. These impairments interfere with their ability to perform work tasks and recreational activities as compared to previous level of function.     Clinical Decision Making (Complexity):  Clinical Presentation: Stable/Uncomplicated  Clinical Presentation Rationale: based on medical and personal factors listed in PT evaluation  Clinical Decision Making (Complexity): Low complexity    PLAN OF CARE  Treatment Interventions:  Interventions: Manual Therapy, Neuromuscular Re-education, Therapeutic Activity, Therapeutic Exercise, Self-Care/Home Management    Long Term Goals     PT Goal 1  Goal Identifier: LTG1  Goal Description: Patient will be able to sit in good posture without verbal cues or reminders for the duration of a PT session  Rationale: to maximize safety and independence with performance of ADLs and functional tasks;to maximize safety and independence within the home;to maximize safety and independence within the  community;to maximize safety and independence with transportation;to maximize safety and independence with self cares  Target Date: 11/26/24  PT Goal 2  Goal Identifier: LTG2  Goal Description: Patient will be able to report compliance with HEP program over a course of a 2 month period  Rationale: to maximize safety and independence with performance of ADLs and functional tasks;to maximize safety and independence within the home;to maximize safety and independence within the community;to maximize safety and independence with transportation;to maximize safety and independence with self cares  Target Date: 11/26/24      Frequency of Treatment: 1x per 4 weeks  Duration of Treatment: 16 weeks    Recommended Referrals to Other Professionals:   Education Assessment:   Learner/Method: Patient    Risks and benefits of evaluation/treatment have been explained.   Patient/Family/caregiver agrees with Plan of Care.     Evaluation Time:     PT Eval, Low Complexity Minutes (18508): 15       Signing Clinician: Waylon Blanco PT

## 2024-10-07 ENCOUNTER — VIRTUAL VISIT (OUTPATIENT)
Dept: PSYCHOLOGY | Facility: CLINIC | Age: 54
End: 2024-10-07
Payer: COMMERCIAL

## 2024-10-07 DIAGNOSIS — Z53.9 ERRONEOUS ENCOUNTER--DISREGARD: ICD-10-CM

## 2024-10-07 DIAGNOSIS — F43.23 ADJUSTMENT DISORDER WITH MIXED ANXIETY AND DEPRESSED MOOD: Primary | ICD-10-CM

## 2024-10-07 PROCEDURE — 90834 PSYTX W PT 45 MINUTES: CPT | Mod: 95 | Performed by: SOCIAL WORKER

## 2024-10-07 ASSESSMENT — ANXIETY QUESTIONNAIRES
8. IF YOU CHECKED OFF ANY PROBLEMS, HOW DIFFICULT HAVE THESE MADE IT FOR YOU TO DO YOUR WORK, TAKE CARE OF THINGS AT HOME, OR GET ALONG WITH OTHER PEOPLE?: SOMEWHAT DIFFICULT
2. NOT BEING ABLE TO STOP OR CONTROL WORRYING: MORE THAN HALF THE DAYS
IF YOU CHECKED OFF ANY PROBLEMS ON THIS QUESTIONNAIRE, HOW DIFFICULT HAVE THESE PROBLEMS MADE IT FOR YOU TO DO YOUR WORK, TAKE CARE OF THINGS AT HOME, OR GET ALONG WITH OTHER PEOPLE: SOMEWHAT DIFFICULT
5. BEING SO RESTLESS THAT IT IS HARD TO SIT STILL: SEVERAL DAYS
1. FEELING NERVOUS, ANXIOUS, OR ON EDGE: NEARLY EVERY DAY
7. FEELING AFRAID AS IF SOMETHING AWFUL MIGHT HAPPEN: NEARLY EVERY DAY
6. BECOMING EASILY ANNOYED OR IRRITABLE: NOT AT ALL
GAD7 TOTAL SCORE: 13
4. TROUBLE RELAXING: SEVERAL DAYS
3. WORRYING TOO MUCH ABOUT DIFFERENT THINGS: NEARLY EVERY DAY
GAD7 TOTAL SCORE: 13
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
## 2024-10-14 ASSESSMENT — SLEEP AND FATIGUE QUESTIONNAIRES
HOW LIKELY ARE YOU TO NOD OFF OR FALL ASLEEP WHILE SITTING AND TALKING TO SOMEONE: WOULD NEVER DOZE
HOW LIKELY ARE YOU TO NOD OFF OR FALL ASLEEP WHILE SITTING AND READING: SLIGHT CHANCE OF DOZING
HOW LIKELY ARE YOU TO NOD OFF OR FALL ASLEEP WHILE WATCHING TV: SLIGHT CHANCE OF DOZING
HOW LIKELY ARE YOU TO NOD OFF OR FALL ASLEEP WHILE SITTING INACTIVE IN A PUBLIC PLACE: WOULD NEVER DOZE
HOW LIKELY ARE YOU TO NOD OFF OR FALL ASLEEP WHILE LYING DOWN TO REST IN THE AFTERNOON WHEN CIRCUMSTANCES PERMIT: MODERATE CHANCE OF DOZING
HOW LIKELY ARE YOU TO NOD OFF OR FALL ASLEEP IN A CAR, WHILE STOPPED FOR A FEW MINUTES IN TRAFFIC: WOULD NEVER DOZE
HOW LIKELY ARE YOU TO NOD OFF OR FALL ASLEEP WHEN YOU ARE A PASSENGER IN A CAR FOR AN HOUR WITHOUT A BREAK: SLIGHT CHANCE OF DOZING
HOW LIKELY ARE YOU TO NOD OFF OR FALL ASLEEP WHILE SITTING QUIETLY AFTER LUNCH WITHOUT ALCOHOL: WOULD NEVER DOZE

## 2024-10-15 ENCOUNTER — OFFICE VISIT (OUTPATIENT)
Dept: SLEEP MEDICINE | Facility: CLINIC | Age: 54
End: 2024-10-15
Attending: NURSE PRACTITIONER
Payer: COMMERCIAL

## 2024-10-15 DIAGNOSIS — R29.818 SUSPECTED SLEEP APNEA: ICD-10-CM

## 2024-10-15 DIAGNOSIS — I10 PRIMARY HYPERTENSION: ICD-10-CM

## 2024-10-15 DIAGNOSIS — E66.09 CLASS 1 OBESITY DUE TO EXCESS CALORIES WITH SERIOUS COMORBIDITY AND BODY MASS INDEX (BMI) OF 30.0 TO 30.9 IN ADULT: ICD-10-CM

## 2024-10-15 DIAGNOSIS — F41.1 GENERALIZED ANXIETY DISORDER: ICD-10-CM

## 2024-10-15 DIAGNOSIS — E66.811 CLASS 1 OBESITY DUE TO EXCESS CALORIES WITH SERIOUS COMORBIDITY AND BODY MASS INDEX (BMI) OF 30.0 TO 30.9 IN ADULT: ICD-10-CM

## 2024-10-15 DIAGNOSIS — R00.2 PALPITATIONS: ICD-10-CM

## 2024-10-15 PROCEDURE — G0399 HOME SLEEP TEST/TYPE 3 PORTA: HCPCS | Performed by: INTERNAL MEDICINE

## 2024-10-15 NOTE — PROGRESS NOTES
Pt is completing a home sleep test. Pt was instructed on how to put on the Noxturnal T3 device and associated equipment before going to bed and given the opportunity to practice putting it on before leaving the sleep center. Pt was reminded to bring the home sleep test kit back to the center tomorrow, at the scheduled time for download and reporting. Patient was instructed to complete study using the following treatment?  None    Device number: 25  Kerrie Winn CMA on 10/15/2024 at 10:21 AM

## 2024-10-16 ENCOUNTER — DOCUMENTATION ONLY (OUTPATIENT)
Dept: SLEEP MEDICINE | Facility: CLINIC | Age: 54
End: 2024-10-16
Attending: NURSE PRACTITIONER
Payer: COMMERCIAL

## 2024-10-16 NOTE — NURSING NOTE
Pt returned HST device. It was downloaded and forwarded data to the clinical specialist for scoring.   Kerrie Winn CMA on 10/16/2024 at 8:54 AM

## 2024-10-16 NOTE — PROGRESS NOTES
HST POST-STUDY QUESTIONNAIRE    What time did you go to bed?  12:05 am  How long do you think it took to fall asleep?  30 minutes  What time did you wake up to start the day?  6:40 am  Did you get up during the night at all?  No  If you woke up, do you remember approximately what time(s)? 4:30 am?  Did you have any difficulty with the equipment?  No  Did you us any type of treatment with this study?  None  Was the head of the bed elevated? No  Did you sleep in a recliner?  No  Did you stop using CPAP at least 3 days before this test?  NA  Any other information you'd like us to know?

## 2024-10-18 ENCOUNTER — LAB (OUTPATIENT)
Dept: LAB | Facility: CLINIC | Age: 54
End: 2024-10-18
Attending: FAMILY MEDICINE
Payer: COMMERCIAL

## 2024-10-18 DIAGNOSIS — R73.09 ELEVATED GLUCOSE: ICD-10-CM

## 2024-10-18 LAB
EST. AVERAGE GLUCOSE BLD GHB EST-MCNC: 114 MG/DL
HBA1C MFR BLD: 5.6 % (ref 0–5.6)

## 2024-10-18 PROCEDURE — 83036 HEMOGLOBIN GLYCOSYLATED A1C: CPT

## 2024-10-18 PROCEDURE — 36415 COLL VENOUS BLD VENIPUNCTURE: CPT

## 2024-10-21 NOTE — PROGRESS NOTES
This HSAT was performed using a Noxturnal T3 device which recorded snore, sound, movement activity, body position, nasal pressure, oronasal thermal airflow, pulse, oximetry and both chest and abdominal respiratory effort. HSAT data was restricted to the time patient states they were in bed.     HSAT was scored using 1B 4% hypopnea rule.     HST AHI (Non-PAT): 7.3  Snoring was reported as loud.  Time with SpO2 below 89% was 0.6 minutes.   Overall signal quality was good     Pt will follow up with sleep provider to determine appropriate therapy.

## 2024-10-22 NOTE — PROCEDURES
Home Sleep Study Interpretation    Patient: Adela Stone  MRN: [id number]  YOB: 1970  Study Date: 10/15/2024  PCP/Referring Provider: Paty Honeycutt;  Ordering Provider: TREY Santiago    Indications for Home Study: Adela is a 54 Female who presents with symptoms suggestive of obstructive sleep apnea      Weight: 166.0 lbs  BMI: 30.4   [place]:   STOP BAN/8      Data: A full night home sleep study was performed recording the standard physiologic parameters including body position, movement, sound, nasal pressure, thermal oral airflow, chest and abdominal movements with respiratory inductance plethysmography, and oxygen saturation by pulse oximetry. Pulse rate was estimated by oximetry recording. This study was considered adequate based on > 4 hours of quality oximetry and respiratory recording. As specified by the AASM Manual for the Scoring of Sleep and Associated events, version 2.3, Rule VIII.D 1B, 4% oxygen desaturation scoring for hypopneas is used as a standard of care on all home sleep apnea testing.    Analysis Time: 370 minutes    Respiration:  Sleep Associated Hypoxemia: Sustained hypoxemia was not present. Baseline oxygen saturation was 97. Time with saturation less than 89% was 0.6 minutes. Time with saturation less than 90% was 2.7 minutes. The lowest oxygen saturation was 86.0%.  Snoring: Snoring was present 12% of the time with an average level of 71 dB. Duration of time snoring above 70 dB was 42.9 minutes.    Respiratory events: The home study revealed a presence of 10 obstructive apneas and 3 mixed and central apneas. There were 32 hypopneas resulting in a combined apnea/hypopnea index [AHI] of 7 events per hour with  14 per hour supine, 0  per hour prone, 0 per hour upright, 0  per hour left side, and 1 per hour right side.    Position: Percent of time spent: Supine 48%, prone 0%, upright 0%, on left 0%, on right 52%.    Heart Rate: By Pulse Oximetry normal rate  was noted.    Assessment:  mild obstructive sleep apnea.  Sleep associated hypoxemia was not present.    Recommendations:  Consider auto-CPAP at 5-15 cmH2O, oral appliance therapy, or positional therapy  Suggest optimizing sleep hygiene and avoiding sleep deprivation  Weight management    Diagnosis Code(s): Obstructive Sleep Apnea G47.33    Electronically signed by: Aidan Segura DO October 22, 2024  Diplomate, American Board of Internal Medicine, Sleep Medicine

## 2024-10-30 ASSESSMENT — ANXIETY QUESTIONNAIRES
IF YOU CHECKED OFF ANY PROBLEMS ON THIS QUESTIONNAIRE, HOW DIFFICULT HAVE THESE PROBLEMS MADE IT FOR YOU TO DO YOUR WORK, TAKE CARE OF THINGS AT HOME, OR GET ALONG WITH OTHER PEOPLE: SOMEWHAT DIFFICULT
6. BECOMING EASILY ANNOYED OR IRRITABLE: SEVERAL DAYS
2. NOT BEING ABLE TO STOP OR CONTROL WORRYING: NEARLY EVERY DAY
8. IF YOU CHECKED OFF ANY PROBLEMS, HOW DIFFICULT HAVE THESE MADE IT FOR YOU TO DO YOUR WORK, TAKE CARE OF THINGS AT HOME, OR GET ALONG WITH OTHER PEOPLE?: SOMEWHAT DIFFICULT
5. BEING SO RESTLESS THAT IT IS HARD TO SIT STILL: SEVERAL DAYS
1. FEELING NERVOUS, ANXIOUS, OR ON EDGE: NEARLY EVERY DAY
4. TROUBLE RELAXING: SEVERAL DAYS
GAD7 TOTAL SCORE: 14
3. WORRYING TOO MUCH ABOUT DIFFERENT THINGS: NEARLY EVERY DAY
GAD7 TOTAL SCORE: 14
7. FEELING AFRAID AS IF SOMETHING AWFUL MIGHT HAPPEN: MORE THAN HALF THE DAYS

## 2024-10-31 ENCOUNTER — VIRTUAL VISIT (OUTPATIENT)
Dept: PSYCHOLOGY | Facility: CLINIC | Age: 54
End: 2024-10-31
Payer: COMMERCIAL

## 2024-10-31 DIAGNOSIS — F41.1 GAD (GENERALIZED ANXIETY DISORDER): Primary | ICD-10-CM

## 2024-10-31 DIAGNOSIS — Z63.8 FAMILY DISRUPTION DUE TO PARENT-CHILD ESTRANGEMENT: ICD-10-CM

## 2024-10-31 DIAGNOSIS — Z62.890 FAMILY DISRUPTION DUE TO PARENT-CHILD ESTRANGEMENT: ICD-10-CM

## 2024-10-31 PROCEDURE — 90837 PSYTX W PT 60 MINUTES: CPT | Mod: 95 | Performed by: SOCIAL WORKER

## 2024-10-31 SDOH — SOCIAL STABILITY - SOCIAL INSECURITY: OTHER SPECIFIED PROBLEMS RELATED TO PRIMARY SUPPORT GROUP: Z63.8

## 2024-11-02 ENCOUNTER — TELEPHONE (OUTPATIENT)
Dept: PSYCHOLOGY | Facility: CLINIC | Age: 54
End: 2024-11-02
Payer: COMMERCIAL

## 2024-11-02 ENCOUNTER — MYC MEDICAL ADVICE (OUTPATIENT)
Dept: FAMILY MEDICINE | Facility: CLINIC | Age: 54
End: 2024-11-02
Payer: COMMERCIAL
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
## 2024-11-02 NOTE — TELEPHONE ENCOUNTER
Per 10/31 session email sent to Client and her daughter, inviting daughter for an individual session, to help decide whether therapist is a good fit family therapist

## 2024-11-05 ENCOUNTER — HOSPITAL ENCOUNTER (OUTPATIENT)
Dept: CARDIOLOGY | Facility: CLINIC | Age: 54
Discharge: HOME | End: 2024-11-05
Attending: FAMILY MEDICINE | Admitting: FAMILY MEDICINE
Payer: COMMERCIAL

## 2024-11-05 DIAGNOSIS — E78.2 MIXED HYPERLIPIDEMIA: ICD-10-CM

## 2024-11-05 PROCEDURE — 75571 CT HRT W/O DYE W/CA TEST: CPT | Mod: 26 | Performed by: INTERNAL MEDICINE

## 2024-11-05 PROCEDURE — 75571 CT HRT W/O DYE W/CA TEST: CPT

## 2024-11-08 NOTE — TELEPHONE ENCOUNTER
Completed form faxed to [phone] and emailed a copy to patient per request.  Original placed in scan basket to be scan.

## 2024-11-11 PROBLEM — E55.9 VITAMIN D DEFICIENCY: Status: RESOLVED | Noted: 2024-05-24 | Resolved: 2024-11-11

## 2024-11-12 ENCOUNTER — TELEPHONE (OUTPATIENT)
Dept: SLEEP MEDICINE | Facility: CLINIC | Age: 54
End: 2024-11-12

## 2024-11-12 NOTE — TELEPHONE ENCOUNTER
Patient needs to be rescheduled for their virtual visit due to Reason for Reschedule: Patient Request    Scheduling team, please refer to service line late cancellation/no-show policies and reach out to patient at a later date for rescheduling.    Appointment mode: Video  Provider: TISH DE LEON

## 2024-11-14 ENCOUNTER — HOSPITAL ENCOUNTER (OUTPATIENT)
Dept: MAMMOGRAPHY | Facility: CLINIC | Age: 54
Discharge: HOME | End: 2024-11-14
Attending: FAMILY MEDICINE
Payer: COMMERCIAL

## 2024-11-14 DIAGNOSIS — Z12.31 ENCOUNTER FOR SCREENING MAMMOGRAM FOR MALIGNANT NEOPLASM OF BREAST: ICD-10-CM

## 2024-11-14 PROCEDURE — 77063 BREAST TOMOSYNTHESIS BI: CPT

## 2024-11-15 ENCOUNTER — OFFICE VISIT (OUTPATIENT)
Dept: FAMILY MEDICINE | Facility: CLINIC | Age: 54
End: 2024-11-15
Payer: COMMERCIAL

## 2024-11-15 VITALS
RESPIRATION RATE: 14 BRPM | TEMPERATURE: 98.5 F | BODY MASS INDEX: 28.56 KG/M2 | HEIGHT: 62 IN | DIASTOLIC BLOOD PRESSURE: 84 MMHG | OXYGEN SATURATION: 98 % | SYSTOLIC BLOOD PRESSURE: 136 MMHG | HEART RATE: 78 BPM | WEIGHT: 155.2 LBS

## 2024-11-15 DIAGNOSIS — E78.2 MIXED HYPERLIPIDEMIA: Primary | ICD-10-CM

## 2024-11-15 DIAGNOSIS — I25.10 CORONARY ARTERY DISEASE DUE TO CALCIFIED CORONARY LESION: ICD-10-CM

## 2024-11-15 DIAGNOSIS — I25.84 CORONARY ARTERY DISEASE DUE TO CALCIFIED CORONARY LESION: ICD-10-CM

## 2024-11-15 PROCEDURE — 90673 RIV3 VACCINE NO PRESERV IM: CPT | Performed by: FAMILY MEDICINE

## 2024-11-15 PROCEDURE — 90471 IMMUNIZATION ADMIN: CPT | Performed by: FAMILY MEDICINE

## 2024-11-15 PROCEDURE — 99214 OFFICE O/P EST MOD 30 MIN: CPT | Mod: 25 | Performed by: FAMILY MEDICINE

## 2024-11-15 RX ORDER — ASPIRIN 81 MG/1
81 TABLET ORAL DAILY
Qty: 90 TABLET | Refills: 3 | Status: SHIPPED | OUTPATIENT
Start: 2024-11-15

## 2024-11-15 RX ORDER — ROSUVASTATIN CALCIUM 40 MG/1
40 TABLET, COATED ORAL DAILY
Qty: 90 TABLET | Refills: 3 | Status: SHIPPED | OUTPATIENT
Start: 2024-11-15

## 2024-11-15 NOTE — ASSESSMENT & PLAN NOTE
Hyperlipidemia with coronary calcium score of 455.  We did discuss that this is concerning and that statin is indicated.  High-dose statin plus baby aspirin is recommended.  She was tearful today.  She is anxious about starting a statin.  We did review side effects of both aspirin and statin today.  In the end she asked me to prescribe both of them and she would like to consult with a preventative cardiologist to get another opinion.  She was also given information on statins and some videos to watch.    I did encourage her to look at this in a positive light this is a preventative measure that we can take before she has any long-term sequela from her coronary plaques.

## 2024-11-15 NOTE — ASSESSMENT & PLAN NOTE
Calcified coronary arteries noted on coronary calcium score.  See hyperlipidemia in the problem list.  Preventative cardiology is consulted.

## 2024-11-15 NOTE — PROGRESS NOTES
I spent a total of 30 minutes with this patient, and review of the medical record and with documentation.  She had numerous questions and was tearful and emotional during our discussion today.  I took the time to answer her questions and give her information on the medications that we are discussing.  This time was spent on the day of service.      Problem List Items Addressed This Visit          Endocrine    Mixed hyperlipidemia - Primary     Hyperlipidemia with coronary calcium score of 455.  We did discuss that this is concerning and that statin is indicated.  High-dose statin plus baby aspirin is recommended.  She was tearful today.  She is anxious about starting a statin.  We did review side effects of both aspirin and statin today.  In the end she asked me to prescribe both of them and she would like to consult with a preventative cardiologist to get another opinion.  She was also given information on statins and some videos to watch.    I did encourage her to look at this in a positive light this is a preventative measure that we can take before she has any long-term sequela from her coronary plaques.         Relevant Medications    rosuvastatin (CRESTOR) 40 MG tablet    aspirin 81 MG EC tablet    Other Relevant Orders    Adult Cardiology Eval  Referral       Circulatory    CAD (coronary artery disease)     Calcified coronary arteries noted on coronary calcium score.  See hyperlipidemia in the problem list.  Preventative cardiology is consulted.             Emely Millan is a 54 year old, presenting for the following health issues:  folow up results (Calcium Scan Results and Treatment Recommendations)      11/15/2024     2:30 PM   Additional Questions   Roomed by ac   Accompanied by self     History of Present Illness       Hyperlipidemia:  She presents for follow up of hyperlipidemia.   She is not taking medication to lower cholesterol. She is not having myalgia or other side effects to statin  "medications.    She eats 4 or more servings of fruits and vegetables daily.She consumes 1 sweetened beverage(s) daily.She exercises with enough effort to increase her heart rate 20 to 29 minutes per day.  She exercises with enough effort to increase her heart rate 3 or less days per week.   She is taking medications regularly.           Objective    /84 (BP Location: Left arm, Patient Position: Sitting, Cuff Size: Adult Regular)   Pulse 78   Temp 98.5  F (36.9  C) (Oral)   Resp 14   Ht 1.575 m (5' 2\")   Wt 70.4 kg (155 lb 3.2 oz)   LMP  (LMP Unknown)   SpO2 98%   BMI 28.39 kg/m    Body mass index is 28.39 kg/m .  Physical Exam  Constitutional:       Appearance: Normal appearance.   HENT:      Head: Normocephalic and atraumatic.   Cardiovascular:      Rate and Rhythm: Normal rate and regular rhythm.   Pulmonary:      Effort: Pulmonary effort is normal.   Musculoskeletal:         General: Normal range of motion.      Cervical back: Normal range of motion and neck supple.   Neurological:      General: No focal deficit present.      Mental Status: She is alert and oriented to person, place, and time.                Signed Electronically by: Paty Honeycutt MD    "

## 2024-11-18 ENCOUNTER — VIRTUAL VISIT (OUTPATIENT)
Dept: PSYCHOLOGY | Facility: CLINIC | Age: 54
End: 2024-11-18
Payer: COMMERCIAL

## 2024-11-18 DIAGNOSIS — F41.1 GAD (GENERALIZED ANXIETY DISORDER): Primary | ICD-10-CM

## 2024-11-18 DIAGNOSIS — Z62.890 FAMILY DISRUPTION DUE TO PARENT-CHILD ESTRANGEMENT: ICD-10-CM

## 2024-11-18 DIAGNOSIS — Z63.8 FAMILY DISRUPTION DUE TO PARENT-CHILD ESTRANGEMENT: ICD-10-CM

## 2024-11-18 PROCEDURE — 90837 PSYTX W PT 60 MINUTES: CPT | Mod: 95 | Performed by: SOCIAL WORKER

## 2024-11-18 SDOH — SOCIAL STABILITY - SOCIAL INSECURITY: OTHER SPECIFIED PROBLEMS RELATED TO PRIMARY SUPPORT GROUP: Z63.8

## 2024-11-18 ASSESSMENT — ANXIETY QUESTIONNAIRES
1. FEELING NERVOUS, ANXIOUS, OR ON EDGE: NEARLY EVERY DAY
2. NOT BEING ABLE TO STOP OR CONTROL WORRYING: NEARLY EVERY DAY
5. BEING SO RESTLESS THAT IT IS HARD TO SIT STILL: SEVERAL DAYS
3. WORRYING TOO MUCH ABOUT DIFFERENT THINGS: NEARLY EVERY DAY
GAD7 TOTAL SCORE: 16
8. IF YOU CHECKED OFF ANY PROBLEMS, HOW DIFFICULT HAVE THESE MADE IT FOR YOU TO DO YOUR WORK, TAKE CARE OF THINGS AT HOME, OR GET ALONG WITH OTHER PEOPLE?: VERY DIFFICULT
7. FEELING AFRAID AS IF SOMETHING AWFUL MIGHT HAPPEN: NEARLY EVERY DAY
4. TROUBLE RELAXING: MORE THAN HALF THE DAYS
IF YOU CHECKED OFF ANY PROBLEMS ON THIS QUESTIONNAIRE, HOW DIFFICULT HAVE THESE PROBLEMS MADE IT FOR YOU TO DO YOUR WORK, TAKE CARE OF THINGS AT HOME, OR GET ALONG WITH OTHER PEOPLE: VERY DIFFICULT
GAD7 TOTAL SCORE: 16
6. BECOMING EASILY ANNOYED OR IRRITABLE: SEVERAL DAYS

## 2024-11-19 ENCOUNTER — DOCUMENTATION ONLY (OUTPATIENT)
Dept: PSYCHOLOGY | Facility: CLINIC | Age: 54
End: 2024-11-19
Payer: COMMERCIAL
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
## 2024-11-19 NOTE — PATIENT INSTRUCTIONS
Offering support to daughter  Getting clarification on medical issues   Working with psychiatry on medication

## 2024-11-26 ENCOUNTER — OFFICE VISIT (OUTPATIENT)
Dept: CARDIOLOGY | Facility: CLINIC | Age: 54
End: 2024-11-26
Payer: COMMERCIAL

## 2024-11-26 VITALS
SYSTOLIC BLOOD PRESSURE: 137 MMHG | WEIGHT: 157 LBS | HEIGHT: 62 IN | OXYGEN SATURATION: 99 % | BODY MASS INDEX: 28.89 KG/M2 | DIASTOLIC BLOOD PRESSURE: 90 MMHG

## 2024-11-26 DIAGNOSIS — E78.2 MIXED HYPERLIPIDEMIA: ICD-10-CM

## 2024-11-26 DIAGNOSIS — R93.1 AGATSTON CORONARY ARTERY CALCIUM SCORE GREATER THAN 400: Primary | ICD-10-CM

## 2024-11-26 NOTE — LETTER
11/26/2024    Paty Honeycutt MD  [address]  [facility] [address]    RE: Adela Stone       Dear Colleague,     I had the pleasure of seeing Adela Stone in the [facility].  CARDIOLOGY CLINIC CONSULT    REASON FOR CONSULT: Coronary calcification    PRIMARY CARE PHYSICIAN:  Paty Honeycutt    HISTORY OF PRESENT ILLNESS:  54-year-old female with history of hypertension with recent CT coronary calcium score showing a total calcium score of 455 was recommended to initiate aspirin and statin here for second opinion.  She also has a history of dyslipidemia with mildly elevated LDL of 110, anxiety, kyphosis and possible sleep apnea.    She does not have any history of chest pain or abnormal dyspnea. She has not been as active as she would like to be lately, but tolerates a brisk walk on the treadmill. She has a history of having palpitations when she feels anxious and this has been evaluated in the past with a Zio patch monitor and Holter which showed no significant arrhythmia and minimal ectopy.    Studies reviewed for discussion at today's visit:  CT coronary calcium score 11/5/2024 total calcium score of 455.  , circumflex 93, .  Zio patch starting May 31 for 9 days and 10 hours of remote cardiac monitoring: Predominant rhythm is sinus 49-1 52 with average of 80.  No  tachyarrhythmias or significant pauses rare ectopy.  Symptoms triggered correlated with with sinus rhythm with and without PVCs or PACs.  48-hour Holter monitor in 2019 also showed sinus rhythm no significant arrhythmia        PAST MEDICAL HISTORY:  Past Medical History:   Diagnosis Date     Anxiety      Palpitations 05/24/2024    Created by Conversion  Formatting of this note might be different from the original.   Created by Conversion       Postmenopausal bleeding 03/14/2024     Vitamin D deficiency 05/24/2024    Created by Conversion  Replacement Utility updated for latest IMO load  Formatting of this note  might be different from the original.   Created by Conversion      Replacement Utility updated for latest IMO load         MEDICATIONS:  Current Outpatient Medications   Medication Sig Dispense Refill     aspirin 81 MG EC tablet Take 1 tablet (81 mg) by mouth daily. 90 tablet 3     desvenlafaxine succinate (PRISTIQ) 25 MG 24 hr tablet Take 1 tablet (25 mg) by mouth daily. 90 tablet 0     lisinopril (ZESTRIL) 10 MG tablet Take 1 tablet (10 mg) by mouth daily. 90 tablet 3     LORazepam (ATIVAN) 0.5 MG tablet Take one daily as needed for severe anxiety 30 tablet 0     propranolol (INDERAL) 20 MG tablet Take one tablet twice a day as needed for anxiety. Take 30-60 minutes ahead of an anxiety provoking event. 30 tablet 2     rosuvastatin (CRESTOR) 40 MG tablet Take 1 tablet (40 mg) by mouth daily. 90 tablet 3     No current facility-administered medications for this visit.       ALLERGIES:  No Known Allergies    SOCIAL HISTORY:  Lifelong nonsmoker  Very rare alcohol   No recreational drugs  Tries to exercise regularly  Has a sedentary job at [employer]    FAMILY HISTORY:  I have reviewed this patient's family history and updated it with pertinent information if needed.   Dad had a stroke ~age 55. He's living at age 86 now and is on a statin. He is a recovered alcohol.   Mom has heart failure. Diagnosed around age 80. By history this may be HFpEF.       REVIEW OF SYSTEMS:  Skin:        Eyes:       ENT:       Respiratory:       Cardiovascular:     no chest pain, dizziness, lightheadedness. H/o palpitations not worse lately  Gastroenterology:      Genitourinary:       Musculoskeletal:     no myalgias  Neurologic:       Psychiatric:     anxiety  Heme/Lymph/Imm:       Endocrine:         PHYSICAL EXAM:      BP: (!) 137/90       SpO2: 99 %      Vital Signs with Ranges  BP: (137)/(90) 137/90  SpO2:  [99 %] 99 %  157 lbs 0 oz    Constitutional: awake, alert, no distress  Eyes: sclera nonicteric  ENT: trachea  "midline  Respiratory: Clear to auscultation bilaterally  Cardiovascular: Regular rate and rhythm no murmur rub or gallop  GI: nondistended, nontender, bowel sounds present  Skin: dry, no rash no edema  Musculoskeletal: grossly normal muscle bulk and tone  Neuropsychiatric: appropriate affect      DATA:   The following labs were reviewed today:    LAST CHOLESTEROL:  Lab Results   Component Value Date    CHOL 178 09/17/2024     Lab Results   Component Value Date    HDL 53 09/17/2024     Lab Results   Component Value Date     09/17/2024     Lab Results   Component Value Date    TRIG 73 09/17/2024     No results found for: \"CHOLHDLRATIO\"    LAST BMP:  Lab Results   Component Value Date     09/17/2024     10/22/2019      Lab Results   Component Value Date    POTASSIUM 4.2 09/17/2024    POTASSIUM 3.8 11/09/2021    POTASSIUM 4.5 10/22/2019     Lab Results   Component Value Date    CHLORIDE 107 09/17/2024    CHLORIDE 109 11/09/2021    CHLORIDE 109 10/22/2019     Lab Results   Component Value Date    BRITTANY 9.4 09/17/2024    BRITTANY 9.2 10/22/2019     Lab Results   Component Value Date    CO2 26 09/17/2024    CO2 28 11/09/2021    CO2 28 10/22/2019     Lab Results   Component Value Date    BUN 13.1 09/17/2024    BUN 11 11/09/2021    BUN 8 10/22/2019     Lab Results   Component Value Date    CR 0.86 09/17/2024    CR 0.69 10/22/2019     Lab Results   Component Value Date     09/17/2024     11/09/2021    GLC 88 10/22/2019       LAST CBC:  Lab Results   Component Value Date    WBC 5.2 09/17/2024    WBC 8.6 10/22/2019     Lab Results   Component Value Date    RBC 4.33 09/17/2024    RBC 4.36 10/22/2019     Lab Results   Component Value Date    HGB 13.1 09/17/2024    HGB 13.5 10/22/2019     Lab Results   Component Value Date    HCT 39.3 09/17/2024    HCT 40.1 10/22/2019     Lab Results   Component Value Date    MCV 91 09/17/2024    MCV 92 10/22/2019     Lab Results   Component Value Date    MCH 30.3 " 09/17/2024    MCH 31.0 10/22/2019     Lab Results   Component Value Date    MCHC 33.3 09/17/2024    MCHC 33.7 10/22/2019     Lab Results   Component Value Date    RDW 13.0 09/17/2024    RDW 12.8 10/22/2019     Lab Results   Component Value Date     09/17/2024     10/22/2019       EKG normal sinus rhythm      ASSESSMENT:  Dyslipidemia  Coronary calcium  HTN with white coat hypertension  Obstructive sleep apnea  Palpitations, no arrhythmia noted on previous workup.  Does not have baseline echo.  Anxiety    RECOMMENDATIONS:  Agree with PCP recommendations for aspirin and statin  Echo  Follow up with sleep medicine, start CPAP if recommended.  Reviewed recommendations for regular moderate exercise, heart healthy Mediterranean style low-sodium diet.  If echo is normal plan for cardiology follow up as needed.     Gloria Lima MD [facility]  Text Page         Thank you for allowing me to participate in the care of your patient.      Sincerely,     Gloria Lima MD     [facility]  cc:   Paty Honeycutt MD  [address] [place] [address]

## 2024-12-03 ASSESSMENT — ANXIETY QUESTIONNAIRES
1. FEELING NERVOUS, ANXIOUS, OR ON EDGE: NEARLY EVERY DAY
4. TROUBLE RELAXING: NEARLY EVERY DAY
7. FEELING AFRAID AS IF SOMETHING AWFUL MIGHT HAPPEN: NEARLY EVERY DAY
3. WORRYING TOO MUCH ABOUT DIFFERENT THINGS: NEARLY EVERY DAY
5. BEING SO RESTLESS THAT IT IS HARD TO SIT STILL: SEVERAL DAYS
7. FEELING AFRAID AS IF SOMETHING AWFUL MIGHT HAPPEN: NEARLY EVERY DAY
6. BECOMING EASILY ANNOYED OR IRRITABLE: SEVERAL DAYS
IF YOU CHECKED OFF ANY PROBLEMS ON THIS QUESTIONNAIRE, HOW DIFFICULT HAVE THESE PROBLEMS MADE IT FOR YOU TO DO YOUR WORK, TAKE CARE OF THINGS AT HOME, OR GET ALONG WITH OTHER PEOPLE: SOMEWHAT DIFFICULT
GAD7 TOTAL SCORE: 17
2. NOT BEING ABLE TO STOP OR CONTROL WORRYING: NEARLY EVERY DAY
GAD7 TOTAL SCORE: 17
8. IF YOU CHECKED OFF ANY PROBLEMS, HOW DIFFICULT HAVE THESE MADE IT FOR YOU TO DO YOUR WORK, TAKE CARE OF THINGS AT HOME, OR GET ALONG WITH OTHER PEOPLE?: SOMEWHAT DIFFICULT
GAD7 TOTAL SCORE: 17

## 2024-12-03 ASSESSMENT — PATIENT HEALTH QUESTIONNAIRE - PHQ9
SUM OF ALL RESPONSES TO PHQ QUESTIONS 1-9: 11
10. IF YOU CHECKED OFF ANY PROBLEMS, HOW DIFFICULT HAVE THESE PROBLEMS MADE IT FOR YOU TO DO YOUR WORK, TAKE CARE OF THINGS AT HOME, OR GET ALONG WITH OTHER PEOPLE: SOMEWHAT DIFFICULT
SUM OF ALL RESPONSES TO PHQ QUESTIONS 1-9: 11

## 2024-12-04 ENCOUNTER — VIRTUAL VISIT (OUTPATIENT)
Dept: PSYCHOLOGY | Facility: CLINIC | Age: 54
End: 2024-12-04
Payer: COMMERCIAL

## 2024-12-04 DIAGNOSIS — F43.23 ADJUSTMENT DISORDER WITH MIXED ANXIETY AND DEPRESSED MOOD: Primary | ICD-10-CM

## 2024-12-04 PROCEDURE — 90834 PSYTX W PT 45 MINUTES: CPT | Mod: 95 | Performed by: SOCIAL WORKER

## 2024-12-05 NOTE — PROGRESS NOTES
[facility]                                     Progress Note    Patient Name: Adela Stone  Date: 12/04/2024         Service Type: Individual      Session Start Time: 8:04  Session End Time:8:44     Session Length: 40    Session #: 2    Attendees: Client attended alone    Service Modality:  Video Visit:      Provider verified identity through the following two step process.  Patient provided:  Patient is known previously to provider    Telemedicine Visit: The patient's condition can be safely assessed and treated via synchronous audio and visual telemedicine encounter.      Reason for Telemedicine Visit: Services only offered telehealth    Originating Site (Patient Location): Patient's other Work place/office    Distant Site (Provider Location): Provider Remote Setting- Home Office    Consent:  The patient/guardian has verbally consented to: the potential risks and benefits of telemedicine (video visit) versus in person care; bill my insurance or make self-payment for services provided; and responsibility for payment of non-covered services.     Patient would like the video invitation sent by:  My Chart    Mode of Communication:  Video Conference via [facility] Also, phone call    Distant Location (Provider):  Off-site    As the provider I attest to compliance with applicable laws and regulations related to telemedicine.    DATA  Interactive Complexity: Yes, visit entailed Interactive Complexity evidenced by:  Crisis: No        Progress Since Last Session (Related to Symptoms / Goals / Homework):   Symptoms: No change Anxious    Homework: Partially completed- last seen for DA      Episode of Care Goals: Minimal progress - ACTION (Actively working towards change); Intervened by reinforcing change plan / affirming steps taken     Current / Ongoing Stressors and Concerns: Patient has been  experiencing high anxiety. No change with her getting her daughter into  family therapy. She hopes daughter will  start her individual therapy soon. Though no current crisis but she is not sure how long this will last.  Patient herself continues to experience anxiety. Was told she might also have OCD.  Was explained the connection between OCD and Anxiety. Was given a  resource to help with anxiety. Will be reading the  work book on anxiety by Brain Oliver, PhD. Patient is concerned about her heart and taking medications for it. It is historically hard for her to take medications. Though has been much more serious about healthy life styles including exercises, diet, good sleep. She is also aware about connecting with good and positive people.  She is also working on taking her medications as prescribed. No safety concern. Will return in January.     Treatment Objective(s) Addressed in This Session:   use thought-stopping strategy daily to reduce intrusive thoughts  Identify negative self-talk and behaviors: challenge core beliefs, myths, and actions     Intervention:   CBT: challenging the cognitive traps about her care   Safety plan: no safety concern    Assessments completed prior to visit:  The following assessments were completed by patient for this visit:  PHQ2:       12/3/2024    10:35 AM 11/2/2024     2:22 PM 8/29/2024     9:01 AM 8/22/2024     6:50 AM 5/24/2024     3:10 PM 4/15/2024     9:50 AM 4/4/2024    10:02 AM   PHQ-2 ( 1999 [employer])   Q1: Little interest or pleasure in doing things 1  1  1  1  2  1 1   Q2: Feeling down, depressed or hopeless 3  1  1  1  3  1 2   PHQ-2 Score 4  2  2 2 5 2 3   Q1: Little interest or pleasure in doing things Several days Several days Several days Several days More than half the days     Q2: Feeling down, depressed or hopeless Nearly every day Several days Several days Several days Nearly every day     PHQ-2 Score 4 2 2 2 5         Patient-reported     PHQ9:       3/14/2024     3:40 PM 4/4/2024    10:02 AM 5/24/2024     3:09 PM 8/23/2024    11:50 AM 9/17/2024    10:26 AM 11/2/2024      2:23 PM 12/3/2024    10:35 AM   PHQ-9 SCORE   PHQ-9 Total Score MyChart 10 (Moderate depression)  13 (Moderate depression) 7 (Mild depression) 8 (Mild depression) 7 (Mild depression) 11 (Moderate depression)   PHQ-9 Total Score 10 11 13 7 8 7  11        Patient-reported     GAD2:       4/15/2024     9:52 AM 6/10/2024     9:32 AM 8/22/2024     6:50 AM 10/7/2024     8:59 AM 10/30/2024     7:07 PM 11/18/2024     8:05 AM 12/3/2024    10:36 AM   PARIS-2   Feeling nervous, anxious, or on edge 3  3  1  3  3  3  3    Not being able to stop or control worrying 1  1  1  2  3  3  3    PARIS-2 Total Score 4 4    4 2    2    2    2 5    5    5 6  6  6        Patient-reported    Multiple values from one day are sorted in reverse-chronological order     GAD7:       6/10/2024     9:32 AM 8/23/2024    12:28 PM 9/17/2024    10:27 AM 10/7/2024     8:59 AM 10/30/2024     7:07 PM 11/18/2024     8:05 AM 12/3/2024    10:36 AM   PARIS-7 SCORE   Total Score 10 (moderate anxiety)  7 (mild anxiety) 13 (moderate anxiety) 14 (moderate anxiety) 16 (severe anxiety) 17 (severe anxiety)   Total Score 10    10 12 7 13    13    13 14  16  17        Patient-reported    Multiple values from one day are sorted in reverse-chronological order     CAGE-AID:       11/21/2023     8:11 AM 8/23/2024    11:53 AM   CAGE-AID Total Score   Total Score 0 0   Total Score MyChart  0 (A total score of 2 or greater is considered clinically significant)     PROMIS 10-Global Health (all questions and answers displayed):       11/21/2023     8:11 AM 4/15/2024     9:55 AM 8/23/2024    11:53 AM 11/2/2024     2:25 PM 12/3/2024    10:38 AM   PROMIS 10   In general, would you say your health is:  Good Fair Good Fair   In general, would you say your quality of life is:  Good Fair Good Fair   In general, how would you rate your physical health?  Good Fair Good Fair   In general, how would you rate your mental health, including your mood and your ability to think?  Fair Fair Fair  Fair   In general, how would you rate your satisfaction with your social activities and relationships?  Poor Poor Poor Poor   In general, please rate how well you carry out your usual social activities and roles  Fair Good Good Fair   To what extent are you able to carry out your everyday physical activities such as walking, climbing stairs, carrying groceries, or moving a chair?  Completely Completely Completely Completely   In the past 7 days, how often have you been bothered by emotional problems such as feeling anxious, depressed, or irritable?  Often Always Always Always   In the past 7 days, how would you rate your fatigue on average?  Mild Mild Mild Mild   In the past 7 days, how would you rate your pain on average, where 0 means no pain, and 10 means worst imaginable pain?  2 3 3 2   In general, would you say your health is: 3 3 2  3  2    In general, would you say your quality of life is: 3 3 2  3  2    In general, how would you rate your physical health? 3 3 2  3  2    In general, how would you rate your mental health, including your mood and your ability to think? 2 2 2  2  2    In general, how would you rate your satisfaction with your social activities and relationships? 1 1 1  1  1    In general, please rate how well you carry out your usual social activities and roles. (This includes activities at home, at work and in your community, and responsibilities as a parent, child, spouse, employee, friend, etc.) 3 2 3  3  2    To what extent are you able to carry out your everyday physical activities such as walking, climbing stairs, carrying groceries, or moving a chair? 5 5 5  5  5    In the past 7 days, how often have you been bothered by emotional problems such as feeling anxious, depressed, or irritable? 5 4 5  5  5    In the past 7 days, how would you rate your fatigue on average? 2 2 2  2  2    In the past 7 days, how would you rate your pain on average, where 0 means no pain, and 10 means worst  imaginable pain? 2 2 3  3  2    Global Mental Health Score 7 8 6    6 7  6    Global Physical Health Score 16 16 15    15 16  15    PROMIS TOTAL - SUBSCORES 23 24 21    21 23  21        Patient-reported    Multiple values from one day are sorted in reverse-chronological order     [place] Suicide Severity Rating Scale (Lifetime/Recent)      11/21/2023     8:21 AM 8/23/2024    12:25 PM   [place] Suicide Severity Rating (Lifetime/Recent)   Q1 Wish to be Dead (Lifetime) N N   Q2 Non-Specific Active Suicidal Thoughts (Lifetime) N N   Actual Attempt (Lifetime) N N   Has subject engaged in non-suicidal self-injurious behavior? (Lifetime) N N   Interrupted Attempts (Lifetime) N N   Aborted or Self-Interrupted Attempt (Lifetime) N N   Preparatory Acts or Behavior (Lifetime) N N   Calculated C-SSRS Risk Score (Lifetime/Recent) No Risk Indicated No Risk Indicated         ASSESSMENT: Current Emotional / Mental Status (status of significant symptoms):   Risk status (Self / Other harm or suicidal ideation)   Patient denies current fears or concerns for personal safety.   Patient denies current or recent suicidal ideation or behaviors.   Patient denies current or recent homicidal ideation or behaviors.   Patient denies current or recent self injurious behavior or ideation.   Patient denies other safety concerns.   Patient reports there has been no change in risk factors since their last session.     Patient reports there has been no change in protective factors since their last session.     Recommended that patient call 911 or go to the local ED should there be a change in any of these risk factors     Appearance:   Appropriate    Eye Contact:   Good    Psychomotor Behavior: Normal    Attitude:   Cooperative    Orientation:   Person Place Time Situation   Speech    Rate / Production: Normal/ Responsive    Volume:  Normal    Mood:    Anxious    Affect:    Appropriate    Thought Content:  Clear    Thought Form:  Coherent  Logical  "   Insight:    Good      Medication Review:   No changes to current psychiatric medication(s)     Medication Compliance:   No     Changes in Health Issues:   None reported     Chemical Use Review:   Substance Use: Chemical use reviewed, no active concerns identified      Tobacco Use: No current tobacco use.      Diagnosis:  1. Adjustment disorder with mixed anxiety and depressed mood      Collateral Reports Completed:   Not Applicable    PLAN: (Patient Tasks / Therapist Tasks / Other)  Patient will use the resource provided to cope with her anxiety.  Patient will keep up with health life styles    Patient's next visit is on 1/02/25  DAWNA Granger  ____________________________________________________________________  Individual Treatment Plan    Patient's Name: Adela Stone  YOB: 1970    Date of Creation: 10/07/2024  Date Treatment Plan Last Reviewed/Revised: 10/07/2024    DSM5 Diagnoses: 300.02 (F41.1) Generalized Anxiety Disorder or Adjustment Disorders  309.28 (F43.23) With mixed anxiety and depressed mood  Psychosocial / Contextual Factors: history of anxiety, family concerns. Stated, \"Relationship challenges with eldest adult daughter   CARLOS (reviewed every 90 days):21     Referral / Collaboration:  Was/were discussed and patient will pursue.    Anticipated number of session for this episode of care: 9-12 sessions  Anticipation frequency of session: Biweekly  Anticipated Duration of each session: 38-52 minutes  Treatment plan will be reviewed in 90 days or when goals have been changed.     MeasurableTreatment Goal(s) related to diagnosis / functional impairment(s)  Goal 1: Patient will resolve the core conflict that is the source of anxiety and depression    I will know I've met my goal when I am able to cope with current stressors at least at 85 % of the time by the next 90 days.      Objective #A (Patient Action)    Patient will use thought-stopping strategy daily to reduce " intrusive thoughts.  Status: New - Date: 10/07/2024    Intervention(s)  Therapist will teach about healthy boundaries. With daughter and focus on self care .    Objective #B  Patient will use distraction each time intrusive worry surfaces.  Status: New - Date: 10/07/2024    Intervention(s)  Therapist will assign homework practicing mindfulness exercises .    Objective #C  Patient will Decrease frequency and intensity of feeling down, depressed, hopeless.  Status: New - Date: 10/07/2024      Intervention(s)  Therapist will  teach CBT skills to challenge any cognitive traps related to current personal, family , and work stressors .    Patient has reviewed and agreed to the above plan.    DAWNA Granger  October 7, 2024   Answers submitted by the patient for this visit:  Patient Health Questionnaire (G7) (Submitted on 10/7/2024)  PARIS 7 TOTAL SCORE: 13

## 2024-12-17 NOTE — TELEPHONE ENCOUNTER
Pt was called to schedule appt with Adela Cisneros. LVM with phone number to call asking them to call back.    Angelica Salmeron    [facility]

## 2025-01-02 ENCOUNTER — VIRTUAL VISIT (OUTPATIENT)
Dept: PSYCHOLOGY | Facility: CLINIC | Age: 55
End: 2025-01-02
Payer: COMMERCIAL

## 2025-01-02 DIAGNOSIS — F41.1 GAD (GENERALIZED ANXIETY DISORDER): Primary | ICD-10-CM

## 2025-01-02 ASSESSMENT — ANXIETY QUESTIONNAIRES
5. BEING SO RESTLESS THAT IT IS HARD TO SIT STILL: MORE THAN HALF THE DAYS
7. FEELING AFRAID AS IF SOMETHING AWFUL MIGHT HAPPEN: MORE THAN HALF THE DAYS
GAD7 TOTAL SCORE: 14
1. FEELING NERVOUS, ANXIOUS, OR ON EDGE: MORE THAN HALF THE DAYS
GAD7 TOTAL SCORE: 14
IF YOU CHECKED OFF ANY PROBLEMS ON THIS QUESTIONNAIRE, HOW DIFFICULT HAVE THESE PROBLEMS MADE IT FOR YOU TO DO YOUR WORK, TAKE CARE OF THINGS AT HOME, OR GET ALONG WITH OTHER PEOPLE: VERY DIFFICULT
2. NOT BEING ABLE TO STOP OR CONTROL WORRYING: MORE THAN HALF THE DAYS
3. WORRYING TOO MUCH ABOUT DIFFERENT THINGS: MORE THAN HALF THE DAYS
6. BECOMING EASILY ANNOYED OR IRRITABLE: MORE THAN HALF THE DAYS

## 2025-01-02 ASSESSMENT — PATIENT HEALTH QUESTIONNAIRE - PHQ9
SUM OF ALL RESPONSES TO PHQ QUESTIONS 1-9: 5
5. POOR APPETITE OR OVEREATING: MORE THAN HALF THE DAYS

## 2025-01-02 NOTE — PROGRESS NOTES
[facility]                                     Progress Note    Patient Name: Adela Stone  Date: 1/02/2025         Service Type: Individual      Session Start Time: 12:03  Session End Time:12:55     Session Length: 52    Session #: 3    Attendees: Client attended alone    Service Modality:  Video Visit:      Provider verified identity through the following two step process.  Patient provided:  Patient is known previously to provider    Telemedicine Visit: The patient's condition can be safely assessed and treated via synchronous audio and visual telemedicine encounter.      Reason for Telemedicine Visit: Services only offered telehealth    Originating Site (Patient Location): Patient's other Work place/office    Distant Site (Provider Location): Provider Remote Setting- Home Office    Consent:  The patient/guardian has verbally consented to: the potential risks and benefits of telemedicine (video visit) versus in person care; bill my insurance or make self-payment for services provided; and responsibility for payment of non-covered services.     Patient would like the video invitation sent by:  My Chart    Mode of Communication:  Video Conference via Amwell Also, phone call    Distant Location (Provider):  Off-site    As the provider I attest to compliance with applicable laws and regulations related to telemedicine.    DATA  Interactive Complexity: No  Crisis: No        Progress Since Last Session (Related to Symptoms / Goals / Homework):   Symptoms: No change Anxious    Homework: Partially completed      Episode of Care Goals: Minimal progress - ACTION (Actively working towards change); Intervened by reinforcing change plan / affirming steps taken- ongoing anxiety.     Current / Ongoing Stressors and Concerns: Patient brought some good news about having her daughter back home now. She though shared how much she has been feeling sick. She notes she needs some patience with herself. She also  shared how she is really anxious about her health.  The fears that she will be sick like worries about getting cancer, or having a hear attack using untrue thinking. Has been this way since childhood which will require some work for some time to feel better.  She is has been trying to listen about [employer] on pad [place]. Shared it is helping. She has shown interest to use the [employer] work book on PARIS . She will also learn about Amygdala which plays a big role in detecting the threats that causes anxiety, fears, rage. She has been making some list to help her keep up with her daily functions. She will be working on challenging self doubt. Tries deep breathing. She can try danica talk on how to make stress your friend. Her next visit is on 2/13.     Treatment Objective(s) Addressed in This Session:   use thought-stopping strategy daily to reduce intrusive thoughts     Intervention:   CBT: Continue to challenge ANTs as noted/shared. Provided tools necessary reflecting her addressed needs; see plan   Safety plan: no safety concerns    Assessments completed prior to visit:  The following assessments were completed by patient for this visit:  PHQ2:       12/3/2024    10:35 AM 11/2/2024     2:22 PM 8/29/2024     9:01 AM 8/22/2024     6:50 AM 5/24/2024     3:10 PM 4/15/2024     9:50 AM 4/4/2024    10:02 AM   PHQ-2 ( 1999 [employer])   Q1: Little interest or pleasure in doing things 1 1 1 1  2 1 1   Q2: Feeling down, depressed or hopeless 3 1 1 1  3 1 2   PHQ-2 Score 4  2  2 2 5 2 3   Q1: Little interest or pleasure in doing things Several days Several days Several days Several days More than half the days     Q2: Feeling down, depressed or hopeless Nearly every day Several days Several days Several days Nearly every day     PHQ-2 Score 4 2 2 2 5         Patient-reported    Proxy-reported     PHQ9:       4/4/2024    10:02 AM 5/24/2024     3:09 PM 8/23/2024    11:50 AM 9/17/2024    10:26 AM 11/2/2024     2:23 PM  12/3/2024    10:35 AM 1/2/2025     6:39 PM   PHQ-9 SCORE   PHQ-9 Total Score MyChart  13 (Moderate depression) 7 (Mild depression) 8 (Mild depression) 7 (Mild depression) 11 (Moderate depression)    PHQ-9 Total Score 11 13 7 8 7  11  5       Patient-reported     GAD2:       4/15/2024     9:52 AM 6/10/2024     9:32 AM 8/22/2024     6:50 AM 10/7/2024     8:59 AM 10/30/2024     7:07 PM 11/18/2024     8:05 AM 12/3/2024    10:36 AM   PARIS-2   Feeling nervous, anxious, or on edge 3  3  1  3 3 3 3   Not being able to stop or control worrying 1  1  1  2 3 3 3   PARIS-2 Total Score 4 4    4 2    2    2    2 5    5    5 6  6  6        Patient-reported    Proxy-reported     GAD7:       8/23/2024    12:28 PM 9/17/2024    10:27 AM 10/7/2024     8:59 AM 10/30/2024     7:07 PM 11/18/2024     8:05 AM 12/3/2024    10:36 AM 1/2/2025     6:39 PM   PARIS-7 SCORE   Total Score  7 (mild anxiety) 13 (moderate anxiety) 14 (moderate anxiety) 16 (severe anxiety) 17 (severe anxiety)    Total Score 12 7 13    13    13 14  16  17  14       Patient-reported     CAGE-AID:       11/21/2023     8:11 AM 8/23/2024    11:53 AM 1/2/2025     6:39 PM   CAGE-AID Total Score   Total Score 0 0 0   Total Score MyChart  0 (A total score of 2 or greater is considered clinically significant)      PROMIS 10-Global Health (all questions and answers displayed):       11/21/2023     8:11 AM 4/15/2024     9:55 AM 8/23/2024    11:53 AM 11/2/2024     2:25 PM 12/3/2024    10:38 AM 1/2/2025     6:39 PM   PROMIS 10   In general, would you say your health is:  Good Fair Good Fair    In general, would you say your quality of life is:  Good Fair Good Fair    In general, how would you rate your physical health?  Good Fair Good Fair    In general, how would you rate your mental health, including your mood and your ability to think?  Fair Fair Fair Fair    In general, how would you rate your satisfaction with your social activities and relationships?  Poor Poor Poor Poor    In  general, please rate how well you carry out your usual social activities and roles  Fair Good Good Fair    To what extent are you able to carry out your everyday physical activities such as walking, climbing stairs, carrying groceries, or moving a chair?  Completely Completely Completely Completely    In the past 7 days, how often have you been bothered by emotional problems such as feeling anxious, depressed, or irritable?  Often Always Always Always    In the past 7 days, how would you rate your fatigue on average?  Mild Mild Mild Mild    In the past 7 days, how would you rate your pain on average, where 0 means no pain, and 10 means worst imaginable pain?  2 3 3 2    In general, would you say your health is: 3 3  2 3 2 3   In general, would you say your quality of life is: 3 3  2 3 2 3   In general, how would you rate your physical health? 3 3  2 3 2 3   In general, how would you rate your mental health, including your mood and your ability to think? 2 2  2 2 2 3   In general, how would you rate your satisfaction with your social activities and relationships? 1 1  1 1 1 3   In general, please rate how well you carry out your usual social activities and roles. (This includes activities at home, at work and in your community, and responsibilities as a parent, child, spouse, employee, friend, etc.) 3 2  3 3 2    To what extent are you able to carry out your everyday physical activities such as walking, climbing stairs, carrying groceries, or moving a chair? 5 5  5 5 5 3   In the past 7 days, how often have you been bothered by emotional problems such as feeling anxious, depressed, or irritable? 5 4  5 5 5 3   In the past 7 days, how would you rate your fatigue on average? 2 2  2 2 2 2   In the past 7 days, how would you rate your pain on average, where 0 means no pain, and 10 means worst imaginable pain? 2 2  3 3 2 0   Global Mental Health Score 7 8 6    6 7  6  12   Global Physical Health Score 16 16 15    15 16   15  15   PROMIS TOTAL - SUBSCORES 23 24 21    21 23  21  27       Patient-reported    Proxy-reported     [place] Suicide Severity Rating Scale (Lifetime/Recent)      11/21/2023     8:21 AM 8/23/2024    12:25 PM   [place] Suicide Severity Rating (Lifetime/Recent)   Q1 Wish to be Dead (Lifetime) N N   Q2 Non-Specific Active Suicidal Thoughts (Lifetime) N N   Actual Attempt (Lifetime) N N   Has subject engaged in non-suicidal self-injurious behavior? (Lifetime) N N   Interrupted Attempts (Lifetime) N N   Aborted or Self-Interrupted Attempt (Lifetime) N N   Preparatory Acts or Behavior (Lifetime) N N   Calculated C-SSRS Risk Score (Lifetime/Recent) No Risk Indicated No Risk Indicated         ASSESSMENT: Current Emotional / Mental Status (status of significant symptoms):   Risk status (Self / Other harm or suicidal ideation)   Patient denies current fears or concerns for personal safety.   Patient denies current or recent suicidal ideation or behaviors.   Patient denies current or recent homicidal ideation or behaviors.   Patient denies current or recent self injurious behavior or ideation.   Patient denies other safety concerns.   Patient reports there has been no change in risk factors since their last session.     Patient reports there has been no change in protective factors since their last session.     Recommended that patient call 911 or go to the local ED should there be a change in any of these risk factors     Appearance:   Appropriate    Eye Contact:   Good    Psychomotor Behavior: Normal    Attitude:   Cooperative    Orientation:   Person Place Time Situation   Speech    Rate / Production: Normal/ Responsive    Volume:  Normal    Mood:    Anxious    Affect:    Appropriate    Thought Content:  Clear    Thought Form:  Coherent  Logical    Insight:    Good      Medication Review:   No changes to current psychiatric medication(s)     Medication Compliance:   No     Changes in Health Issues:   None  "reported     Chemical Use Review:   Substance Use: Chemical use reviewed, no active concerns identified      Tobacco Use: No current tobacco use.      Diagnosis:  1. PARIS (generalized anxiety disorder)      Collateral Reports Completed:   Not Applicable    PLAN: (Patient Tasks / Therapist Tasks / Other)  Patient will use the workbook on [place]   Patient will acquire a the \"Taming your Amygdala\" by Zoraida Shepard.PhD  Patient will watch \" How to make anxiety your friend\" by Kaila Gross PhD  Patient's next visit is on 2/13    Geraldo Reylevi LICSW  ____________________________________________________________________  Individual Treatment Plan    Patient's Name: Adela Stone  YOB: 1970    Date of Creation: 10/07/2024  Date Treatment Plan Last Reviewed/Revised: 1/02/2025    DSM5 Diagnoses: 300.02 (F41.1) Generalized Anxiety Disorder or Adjustment Disorders  309.28 (F43.23) With mixed anxiety and depressed mood    Psychosocial / Contextual Factors: history of anxiety, family concerns. Stated, \"Relationship challenges with eldest adult daughter   PROMIS (reviewed every 90 days):21     Referral / Collaboration:  Was/were discussed and patient will pursue.    Anticipated number of session for this episode of care: 9-12 sessions  Anticipation frequency of session: Biweekly  Anticipated Duration of each session: 38-52 minutes  Treatment plan will be reviewed in 90 days or when goals have been changed.     MeasurableTreatment Goal(s) related to diagnosis / functional impairment(s)  Goal 1: Patient will resolve the core conflict that is the source of anxiety and depression    I will know I've met my goal when I am able to cope with current stressors at least at 85 % of the time by the next 90 days.      Objective #A (Patient Action)    Patient will use thought-stopping strategy daily to reduce intrusive thoughts.  Status: Continued - Date(s): 1/02/2025  Intervention(s)  Therapist will teach about " healthy boundaries. With daughter and focus on self care .    Objective #B  Patient will use distraction each time intrusive worry surfaces.  Status: Continued - Date(s): 1/02/2025  Intervention(s)  Therapist will assign homework practicing mindfulness exercises .    Objective #C  Patient will Decrease frequency and intensity of feeling down, depressed, hopeless.  Status: Continued - Date(s): 1/02/2025    Intervention(s)  Therapist will  teach CBT skills to challenge any cognitive traps related to current personal, family , and work stressors .    Patient has reviewed and agreed to the above plan.    DAWNA Granger  January 02,2025  Answers submitted by the patient for this visit:  Patient Health Questionnaire (G7) (Submitted on 10/7/2024)  PARIS 7 TOTAL SCORE: 13

## 2025-01-17 ENCOUNTER — HOSPITAL ENCOUNTER (OUTPATIENT)
Dept: CARDIOLOGY | Facility: CLINIC | Age: 55
Discharge: HOME | End: 2025-01-17
Attending: INTERNAL MEDICINE | Admitting: INTERNAL MEDICINE
Payer: COMMERCIAL

## 2025-01-17 DIAGNOSIS — R93.1 AGATSTON CORONARY ARTERY CALCIUM SCORE GREATER THAN 400: ICD-10-CM

## 2025-01-17 DIAGNOSIS — E78.2 MIXED HYPERLIPIDEMIA: ICD-10-CM

## 2025-01-17 LAB — LVEF ECHO: NORMAL

## 2025-01-17 PROCEDURE — 93306 TTE W/DOPPLER COMPLETE: CPT | Mod: 26 | Performed by: INTERNAL MEDICINE

## 2025-01-17 PROCEDURE — 93306 TTE W/DOPPLER COMPLETE: CPT

## 2025-01-23 NOTE — TELEPHONE ENCOUNTER
Please call Yana.  I have printed of her lorazepam rx.  She should also read her My Chart message- response to her holter monitor.  I recommend a f/u visit since this rx was requested early.   CONSTITUTIONAL: No weakness, fevers or chills  EYES/ENT: No visual changes;  No vertigo or throat pain   NECK: No pain or stiffness  RESPIRATORY: No cough, wheezing, hemoptysis; No shortness of breath  CARDIOVASCULAR: No chest pain or palpitations  GASTROINTESTINAL: No abdominal or epigastric pain. No nausea, vomiting, or hematemesis; No diarrhea or constipation. No melena or hematochezia.  GENITOURINARY: No dysuria, frequency or hematuria  NEUROLOGICAL: No numbness or weakness  SKIN: swelling of L foot Enbrel Counseling:  I discussed with the patient the risks of etanercept including but not limited to myelosuppression, immunosuppression, autoimmune hepatitis, demyelinating diseases, lymphoma, and infections.  The patient understands that monitoring is required including a PPD at baseline and must alert us or the primary physician if symptoms of infection or other concerning signs are noted.

## 2025-01-29 NOTE — PROGRESS NOTES
[facility] [place]    Behavioral Health Clinician Progress Note   Mental Health & Addiction Services          Patient Name: Adela Stone       Service Type:  Individual   Service Location:  [facility] [employer] [facility] / Email (patient reached)   Visit Start Time: 831am  Visit End Time:  857am  Session Length: 16 - 37    Attendees: Client   Service Modality: Video Visit:      Provider verified identity through the following two step process.  Patient provided:  Patient  and Patient address    Telemedicine Visit: The patient's condition can be safely assessed and treated via synchronous audio and visual telemedicine encounter.      Reason for Telemedicine Visit: Services only offered telehealth    Originating Site (Patient Location): Patient's home    Distant Site (Provider Location): Provider Remote Setting- Home Office    Consent:  The patient/guardian has verbally consented to: the potential risks and benefits of telemedicine (video visit) versus in person care; bill my insurance or make self-payment for services provided; and responsibility for payment of non-covered services.     Patient would like the video invitation sent by:  My Chart    Mode of Communication:  Video Conference via Amwell    Distant Location (Provider):  Off-site    As the provider I attest to compliance with applicable laws and regulations related to telemedicine.   Visit number: 1    [facility] Visit Activities (Refresh list every visit): [facility]     [place] Diagnostic Assessment: 24 Geraldo Chow [facility]  Treatment Plan Review Date: 24      DATA:    Extended Session (60+ minutes): No   Interactive Complexity: No   Crisis: No   [facility] Patient: No     Assessments completed prior to visit:   The following assessments were completed by patient for this visit:  PHQ9:       2024    10:02 AM 2024     3:09 PM 2024    11:50 AM 2024    10:26 AM 2024     2:23  PM 12/3/2024    10:35 AM 1/2/2025     6:39 PM   PHQ-9 SCORE   PHQ-9 Total Score MyChart  13 (Moderate depression) 7 (Mild depression) 8 (Mild depression) 7 (Mild depression) 11 (Moderate depression)    PHQ-9 Total Score 11 13 7 8 7  11  5       Patient-reported     GAD7:       8/23/2024    12:28 PM 9/17/2024    10:27 AM 10/7/2024     8:59 AM 10/30/2024     7:07 PM 11/18/2024     8:05 AM 12/3/2024    10:36 AM 1/2/2025     6:39 PM   PARIS-7 SCORE   Total Score  7 (mild anxiety) 13 (moderate anxiety) 14 (moderate anxiety) 16 (severe anxiety) 17 (severe anxiety)    Total Score 12 7 13    13    13 14  16  17  14       Patient-reported        Reason for Visit/Presenting Concern:  Anxiety    Current Stressors / Issues:  MH update:  difficult week.  Stressors this week Adult daughter medial emergency-home and okay.   Cardiac concerns.  Not getting large benefit from med?  Maybe helps a bit? Wakes up anxious.  Panic attacks, using Ativan. Has had to go to ER for such.  Physical sx with anxiety then health anxiety. Mood down, compartmentalize to survive. Depressed.  Significant health anxiety/obsessional thoughts about health anxiety.  Other phobia's  Stresses:  hx trauma.  Parents elderly and need care   in residential, but .  3 kiddos  Appetite: n/a  Sleep: anxiety/stressors impacts  Outpatient Provider updates: [facility] family therapy  SI/SIB/HI:  Denies lifetime concern  NICKOLAS:  social ETOH  Side effects/compliance:  Interventions:  [facility] engaged in goal and sx exploration  Most important:  Trying to come off meds?  Get brain zaps if missed.  Other options?    Therapeutic Interventions:  Motivational Interviewing (MI): Validated patient's thoughts, feelings and experience. Expressed respect for patient's autonomy in decision making.  Asked open-ended questions to invite patient's self-reflection and self-direction around change and what is important for them in working towards their goals.      Response to treatment interventions:   Patient was receptive to interventions utilized.  Patient was engaged in the therapy process.       Progress on Treatment Objective(s) / Homework:   New Objective established this session - CONTEMPLATION (Considering change and yet undecided); Intervened by assessing the negative and positive thinking (ambivalence) about behavior change     Medication Review:   Changes to psychiatric medications, see updated Medication List in EPIC.      Medication Compliance:   Yes     Chemical Use Review:  Substance Use: Chemical use reviewed, no active concerns identified      Tobacco Use: No current tobacco use.       Assessment: Current Emotional / Mental Status (status of significant symptoms):    Risk status (Self / Other harm or suicidal ideation)   Patient denies a history of suicidal ideation, suicide attempts, self-injurious behavior, homicidal ideation, homicidal behavior, and and other safety concerns   Patient denies current fears or concerns for personal safety.   Patient denies current or recent suicidal ideation or behaviors.   Patient denies current or recent homicidal ideation or behaviors.   Patient denies current or recent self injurious behavior or ideation.   Patient denies other safety concerns.   Recommended that patient call 911 or go to the local ED should there be a change in any of these risk factors      ASSESSMENT:   Mental Status:     Appearance:   Appropriate    Eye Contact:   Good    Psychomotor Behavior: Normal    Attitude:   Cooperative    Orientation:   All   Speech Rate / Production: Normal    Volume:   Normal    Mood:    Normal   Affect:    Appropriate    Thought Content:  Clear    Thought Form:  Coherent  Logical    Insight:    Good          Diagnoses:      Generalized anxiety disorder  Panic disorder without agoraphobia  Moderate episode of recurrent major depressive disorder (H)    Collateral Reports Completed:   Communicated with: Dr Sandoval         Plan: (Homework, other):   Patient was provided No indications of CD issues  Patient was given information about behavioral services and encouraged to schedule a follow up appointment with the clinic [facility] as needed.         Amy Manning [facility], [facility]     _____________________________________________________________________________________________________________________________________                                              Individual Treatment Plan    Patient's Name: Adela Stone   YOB: 1970  Date of Creation: 1/30/25  Date Treatment Plan Last Reviewed/Revised: 1/30/25    DSM5 Diagnoses:    Generalized anxiety disorder  Panic disorder without agoraphobia  Moderate episode of recurrent major depressive disorder (H)    Psychosocial / Contextual Factors: Trauma History, Relationship Concerns, Interpersonal Concerns, and Caregiver  PROMIS (reviewed every 90 days):   The following assessments were completed by patient for this visit:  PROMIS 10-Global Health (only subscores and total score):       11/21/2023     8:11 AM 4/15/2024     9:55 AM 8/23/2024    11:53 AM 11/2/2024     2:25 PM 12/3/2024    10:38 AM 1/2/2025     6:39 PM   PROMIS-10 Scores Only   Global Mental Health Score 7 8 6    6 7  6  12   Global Physical Health Score 16 16 15    15 16  15  15   PROMIS TOTAL - SUBSCORES 23 24 21    21 23  21  27       Patient-reported        Referral / Collaboration:  Referral to another professional/service is not indicated at this time..    Anticipated number of session for this episode of care:  6-9 sessions  Anticipation frequency of session: Monthly  Anticipated Duration of each session: 16-37 minutes  Treatment plan will be reviewed in 90 days or when goals have been changed.       MeasurableTreatment Goal(s) related to diagnosis / functional impairment(s)  Goal 1: Patient will improve daily functioning.    I will know I've met my goal when I have less panic and anxiety s.      Status: New - Date:  1/30/25      Intervention(s)  [facility] will Motivational Interviewing (MI): Validate patient's thoughts, feelings and experience. Express respect for patient's autonomy in decision making.  Ask open-ended questions to invite patient's self-reflection and self-direction around change and what is important for them in working towards their goals.      Patient has reviewed and agreed to the above plan.     Written by  Amy Manning [facility], [facility]

## 2025-01-29 NOTE — PROGRESS NOTES
"Telemedicine Visit: The patient's condition can be safely assessed and treated via synchronous audio and visual telemedicine encounter.      Reason for Telemedicine Visit: Patient has requested telehealth visit    Originating Site (Patient Location): Patient's home    Distant Location (provider location):  Off-site    Consent:  The patient/guardian has verbally consented to: the potential risks and benefits of telemedicine (video visit) versus in person care; bill my insurance or make self-payment for services provided; and responsibility for payment of non-covered services.     Mode of Communication:  Video Conference via [employer] App    As the provider I attest to compliance with applicable laws and regulations related to telemedicine.         Outpatient Psychiatric Progress Note    Name: Adela MARTINEZ Bertha   : 1970                    Primary Care Provider: Paty Honeycutt MD   Therapist: [facility]      PHQ-9 scores:      12/3/2024    10:35 AM 2025     6:39 PM 2025     7:55 AM   PHQ-9 SCORE   PHQ-9 Total Score MyChart 11 (Moderate depression)  12 (Moderate depression)   PHQ-9 Total Score 11  5 12        Patient-reported       PARIS-7 scores:      12/3/2024    10:36 AM 2025     6:39 PM 2025     7:56 AM   PARIS-7 SCORE   Total Score 17 (severe anxiety)  15 (severe anxiety)   Total Score 17  14 15        Patient-reported       Patient Identification:  Patient is a 54 year old,   White Not  or  female  who presents for return visit with me.  Patient is currently employed full time. Patient attended the phone/video session alone. Patient prefers to be called: \"Yana\".    Interim History:  Patient is a transfer of care today from Dr. Vikas Whittington. Dr. Whittington last saw Adela Stone for outpatient psychiatry Return Visit on 2024. During that appointment, they:    Plan  - Continue current medication regimen for now due to multiple ongoing " issues.  - Consider weaning off current medication when things are more stable.  - Continue using tool for managing anxiety.  - Consider taking Propranolol before entering situations that may trigger social anxiety.  - Consider acupuncture as a potential treatment option.  Refill on the 25mg dose of unspecified medication for anxiety      1/30: Patient is a transfer of care today.  Patient had been seeing colleague for treatment of anxiety and adjustment disorder.  Patient with a number of depression symptoms along with anxiety.  A lot of ruminations, panic attacks, nighttime awakenings with anxiety, health anxiety.  Mood has been down.  Parenting stress.  Work stress.  Parents have been struggling with some health issues.  No support from children's fathers.  Wondering about coming off Pristiq versus other options to treat anxiety.  Uses lorazepam sparingly for severe anxiety and panic symptoms.  Last prescription given in April 2024 for 30 tablets.    Per [facility], [facility], during today's team-based visit:  MH update:  difficult week.  Stressors this week Adult daughter medial emergency-home and okay.   Cardiac concerns.  Not getting large benefit from med?  Maybe helps a bit? Wakes up anxious.  Panic attacks, using Ativan. Has had to go to ER for such.  Physical sx with anxiety then health anxiety. Mood down, compartmentalize to survive. Depressed.  Significant health anxiety/obsessional thoughts about health anxiety.  Other phobia's  Stresses:  hx trauma.  Parents elderly and need care   in penitentiary, but .  3 kiddos  Appetite: n/a  Sleep: anxiety/stressors impacts  Outpatient Provider updates: [facility] family therapy  SI/SIB/HI:  Denies lifetime concern  NICKOLAS:  social ETOH  Side effects/compliance:  Interventions:  [facility] engaged in goal and sx exploration  Most important:  Trying to come off meds?  Get brain zaps if missed.  Other options?    HPI from Dr. Whittington visit  8/22/24:  History of Present Illness    The patient, Yana Stone, a 54-year-old female, presented with a primary diagnosis of generalized anxiety disorder. She reported that her anxiety and mood have been stable recently. However, she expressed a desire to wean off her current medication and try something else, but she is not ready to do so at this moment due to multiple ongoing issues in her life.     She mentioned that she has been using a medication on an as-needed basis, approximately twice in the past month, when she feels out of control. She finds this medication helpful and plans to continue using it as needed.     Yana also reported taking Propranolol, but she expressed concerns about how she might react to this medication. She mentioned that she needs to test it on a day when nothing is going on to ensure she does not have an adverse reaction.     She described experiencing social anxiety, which leads her to avoid certain situations and interactions with people. This anxiety is particularly heightened when she has to do something for her daughter or interact with people in general. She mentioned that this anxiety feels irrational and is hard for her to explain.     Yana also mentioned that she has been using a sleep aid, but the timing of its use changes due to other factors in her life. She reported no issues with this medication.     She expressed interest in trying acupuncture as a potential treatment for her anxiety but has not yet scheduled an appointment due to other commitments.     Regarding her current medications, Yana requested a refill for one of them. She also discussed the possibility of reducing the dosage of this medication gradually when things are more stable in her life.     Overall, Yana seems committed to managing her anxiety and is open to exploring different treatment options. However, she also acknowledges the challenges posed by her current life circumstances and the need for  careful management of her medication regimen.     Past Psychiatric Med Trials:  At first visit with me 1/29/25:  Pristiq 25 mg daily  Ativan 0.5 mg daily prn severe anxiety - last Rx sent May 2024 x 30 tabs  Propranolol 20 mg twice daily as needed for anxiety    Past trials:  Citalopram - rashes  Paroxetine - in late her 20s - worked very well but concern over long term  Desvenlafaxine  Lorazepam- effective and taken sparingly    Psychiatric ROS:  See HPI above for all pertinent positives and negatives. Rest of systems negative.     PHQ9 and GAD7 scores were reviewed today if completed.   Medication side effects:  Denies any major but does get brain zaps if misses a dose of Pristiq  Current stressors include: Symptoms and see HPI above  Coping mechanisms and supports include: Therapy, Hobbies, and Friends    Current medications include:   Current Outpatient Medications   Medication Sig Dispense Refill    aspirin 81 MG EC tablet Take 1 tablet (81 mg) by mouth daily. 90 tablet 3    desvenlafaxine succinate (PRISTIQ) 25 MG 24 hr tablet Take 1 tablet (25 mg) by mouth daily. 90 tablet 0    lisinopril (ZESTRIL) 10 MG tablet Take 1 tablet (10 mg) by mouth daily. 90 tablet 3    LORazepam (ATIVAN) 0.5 MG tablet Take one daily as needed for severe anxiety 30 tablet 0    propranolol (INDERAL) 20 MG tablet Take one tablet twice a day as needed for anxiety. Take 30-60 minutes ahead of an anxiety provoking event. 30 tablet 2    rosuvastatin (CRESTOR) 40 MG tablet Take 1 tablet (40 mg) by mouth daily. 90 tablet 3     No current facility-administered medications for this visit.       The [place] Prescription Monitoring Program has been reviewed and there are no concerns about diversionary activity for controlled substances at this time.   05/14/202405/14/20241  Lorazepam 0.5 Mg Tablet  30.0030Mi [id number] (8638)0/00.50 [place]03/18/202403/18/20241  Lorazepam 0.5 Mg Tablet  30.0030Mi [id number] (8638)0/00.50 [place]MEComm  [place]     Past Medical/Surgical History:  Past Medical History:   Diagnosis Date    Anxiety     Palpitations 05/24/2024    Created by Conversion  Formatting of this note might be different from the original.   Created by Conversion      Postmenopausal bleeding 03/14/2024    Vitamin D deficiency 05/24/2024    Created by Conversion  Replacement Utility updated for latest IMO load  Formatting of this note might be different from the original.   Created by Conversion      Replacement Utility updated for latest IMO load        has a past medical history of Anxiety, Palpitations (05/24/2024), Postmenopausal bleeding (03/14/2024), and Vitamin D deficiency (05/24/2024).    Social History:  Reviewed. No changes to social history except as noted above in HPI.    Vital Signs:   None. This is phone/video visit.     Labs:  Most recent laboratory results reviewed and no new labs.     [employer] testing from 2018 reviewed    Review of Systems:  10 systems (general, cardiovascular, respiratory, eyes, ENT, endocrine, GI, , M/S, neurological) were reviewed. Most pertinent finding(s) is/are:  denies fever, cough, persistent headaches, shortness of breath, chest pain, severe GI symptoms, trouble urinating, severe pain. The remaining systems are all unremarkable.    Mental Status Examination (limited as this is by phone/video):  Appearance: Awake, alert, appears stated age, no acute distress, well-groomed   Attitude:  cooperative, pleasant   Motor: No gross abnormalities observed via video, not formally tested   Oriented to:  person, place, time, and situation  Attention Span and Concentration:  normal  Speech:  clear, coherent, regular rate, rhythm, and volume  Language: intact  Mood:  anxious, down  Affect:  appropriate and in normal range and mood congruent  Associations:  no loose associations  Thought Process:  logical, linear and goal oriented  Thought Content:  no evidence of suicidal ideation or homicidal ideation, no evidence  of psychotic thought, no auditory hallucinations present and no visual hallucinations present  Recent and Remote Memory:  Intact to interview. Not formally assessed. No amnesia.  Fund of Knowledge: appropriate  Insight:  good  Judgment:  intact, adequate for safety  Impulse Control:  intact    Suicide Risk Assessment:  Today Adela Stone reports no suicidal ideation. Based on all available evidence including the factors cited above, Adela Stone does not appear to be at imminent risk for self-harm, does not meet criteria for a 72-hr hold, and therefore remains appropriate for ongoing outpatient level of care.  A thorough assessment of risk factors related to suicide and self-harm have been reviewed and are noted above. The patient convincingly denies suicidality on several occasions. Local community safety resources reviewed for patient to use if needed. There was no deceit detected, and the patient presented in a manner that was believable.     DSM5 Diagnosis:  296.32 (F33.1) Major Depressive Disorder, Recurrent Episode, Moderate _  300.02 (F41.1) Generalized Anxiety Disorder  CYP2D6 poor metabolizer    Medical comorbidities include:   Patient Active Problem List    Diagnosis Date Noted    CAD (coronary artery disease) 11/15/2024     Priority: Medium    Acquired postural kyphosis 09/17/2024     Priority: Medium    Decreased hearing of both ears 09/17/2024     Priority: Medium    Difficulty with family 09/17/2024     Priority: Medium    Mixed hyperlipidemia 09/17/2024     Priority: Medium    Rosacea 05/24/2024     Priority: Medium     Created by Conversion  Formatting of this note might be different from the original.   Created by Conversion      Suspected sleep apnea 05/24/2024     Priority: Medium    Overweight with body mass index (BMI) of 29 to 29.9 in adult 05/24/2024     Priority: Medium    Health care maintenance 09/12/2023     Priority: Medium    Primary hypertension 09/12/2023     Priority: Medium    Social  anxiety disorder 07/30/2018     Priority: Medium       Psychosocial & Contextual Factors: see HPI above    Assessment:  From most recent follow-up with Dr. Whittington, 8/22/2024:  Patient dealing with generalized anxiety symptoms, history of panic. Symptoms are in partial remission with Pristiq. Generally medication management has had efficacy, does prefer a holistic approach to treatment. Discussed risks and benefits of medication management versus general holistic treatment. Referred for integrated medicine. Add buspirone as potential replacement for Pristiq and bridge to holistic intervention if an effective treatment is found.     Assessment  The patient's generalized anxiety disorder appears to be stable at this time. She has expressed a desire to wean off her current medication, but due to multiple ongoing issues, she is not ready to make this change at the moment. She is using a tool to help manage her anxiety, which she has found helpful. The patient also experiences social anxiety, which leads her to avoid certain situations and interactions with people.    1/30/2025:  Patient with ongoing significant anxiety impacting quality of life and day-to-day functioning.  High psychosocial stressors.  Encouraged to continue in psychotherapy.  Discussed transition to sertraline in lieu of the Pristiq.  Patient might wait 2-4 weeks due to some impending stressors before making the transition.  Intermittent lorazepam use will be continued.  No acute safety concerns.  No suicidality.  No problematic drug or alcohol use.    Medication side effects and alternatives were reviewed. Health promotion activities recommended and reviewed today. All questions addressed. Education and counseling completed regarding risks and benefits of medications and psychotherapy options. Recommend therapy for additional support.     Treatment Plan:  Continue lorazepam/Ativan 0.5 mg daily as needed for severe anxiety/panic.  Continue to use  sparingly.  Increase propranolol to 20-40 mg twice daily as needed for anxiety.  Can take about 60 minutes before anxiety provoking event.  Discontinue/taper Pristiq and start sertraline/Zoloft:  Days 1-7  Decrease Pristiq to 12.5 mg daily   Start sertraline 25 mg daily  Days 8-14  Decrease Pristiq by alternating 12.5 mg with no Pristiq every other day   Increase sertraline to 50 mg daily   Days 15-21  STOP Pristiq  Increase sertraline to 75 mg daily  Days 22+   Increase sertraline to 100 mg daily as tolerated  Continue all other cares per primary care provider.   Continue all other medications as reviewed per electronic medical record today.   Safety plan reviewed. To the Emergency Department as needed or call after hours crisis line at [phone] or [phone]. [place] Crisis Text Line. Text [place] to 242778 or Suicide LifeLine Chat: suicidepreventionJibbigoline.org/chat  Continue therapy as planned.   Schedule an appointment with me in 2 months or sooner as needed. Call [facility] at [phone] to schedule.  Follow up with primary care provider as planned or for acute medical concerns.  Call the psychiatric nurse line with medication questions or concerns at [phone].  Refund Exchangehart may be used to communicate with your provider, but this is not intended to be used for emergencies.    Risks of benzodiazepine (Ativan, Xanax, Klonopin, Valium, etc) use including, but not limited to, sedation, tolerance, risk for addiction/dependence. Do not drink alcohol while taking benzodiazepines due to risk of trouble breathing and potential death. Do not drive or operate heavy machinery until it is known how the drug affects you. Discuss with physician or pharmacist before ever taking a benzodiazepine with a narcotic/opioid pain medication.     Serotonin syndrome symptoms usually occur within several hours of taking a new drug or increasing the dose of a drug you're already taking.   Signs and symptoms  include:  Agitation or restlessness  Confusion  Rapid heart rate and high blood pressure  Dilated pupils  Loss of muscle coordination or twitching muscles  Muscle rigidity  Heavy sweating  Diarrhea  Headache  Shivering  Goose bumps    Severe serotonin syndrome can be life-threatening. Signs and symptoms include:  High fever  Seizures  Irregular heartbeat  Unconsciousness    If you suspect you might have serotonin syndrome after starting a new drug or increasing the dose of a drug you're already taking, call your doctor right away or go to the emergency room. If you have severe or rapidly worsening symptoms, seek emergency treatment immediately.    There are some over-the-counter medications AND non-mental health prescribed drugs that can cause or contribute to serotonin syndrome when you are taking a medication already that increases serotonin.     Medications and supplements (Rx'd and OTC) that can increase risk for serotonin syndrome:  https://www.[employer]/medications-and-serotonin-syndrome-6399115    Administrative Billing:   Phone Call/Video Duration: 25 Minutes  Start: 9:16a  Stop: 9:41a    The longitudinal plan of care for the diagnosis(es)/condition(s) as documented were addressed during this visit. Due to the added complexity in care, I will continue to support Yana in the subsequent management and with ongoing continuity of care.    Episode of Care #: 5    Patient Status:  Patient will continue to be seen for ongoing consultation and stabilization.    Signed:   Annabelle Sandoval DO  [facility]    Disclaimer: This note consists of symbols derived from keyboarding, dictation and/or voice recognition software. As a result, there may be errors in the script that have gone undetected. Please consider this when interpreting information found in this chart.

## 2025-01-30 ENCOUNTER — VIRTUAL VISIT (OUTPATIENT)
Dept: BEHAVIORAL HEALTH | Facility: CLINIC | Age: 55
End: 2025-01-30
Payer: COMMERCIAL

## 2025-01-30 ENCOUNTER — VIRTUAL VISIT (OUTPATIENT)
Dept: PSYCHIATRY | Facility: CLINIC | Age: 55
End: 2025-01-30
Payer: COMMERCIAL

## 2025-01-30 DIAGNOSIS — F33.1 MODERATE EPISODE OF RECURRENT MAJOR DEPRESSIVE DISORDER (H): ICD-10-CM

## 2025-01-30 DIAGNOSIS — F41.1 GENERALIZED ANXIETY DISORDER: Primary | ICD-10-CM

## 2025-01-30 DIAGNOSIS — F41.0 PANIC DISORDER WITHOUT AGORAPHOBIA: ICD-10-CM

## 2025-01-30 DIAGNOSIS — E88.89 CYP2D6 POOR METABOLIZER (H): ICD-10-CM

## 2025-01-30 RX ORDER — LORAZEPAM 0.5 MG/1
TABLET ORAL
Qty: 30 TABLET | Refills: 1 | Status: SHIPPED | OUTPATIENT
Start: 2025-01-30

## 2025-01-30 RX ORDER — DESVENLAFAXINE 25 MG/1
25 TABLET, EXTENDED RELEASE ORAL DAILY
Qty: 30 TABLET | Refills: 2 | Status: SHIPPED | OUTPATIENT
Start: 2025-01-30

## 2025-01-30 ASSESSMENT — PATIENT HEALTH QUESTIONNAIRE - PHQ9
SUM OF ALL RESPONSES TO PHQ QUESTIONS 1-9: 12
10. IF YOU CHECKED OFF ANY PROBLEMS, HOW DIFFICULT HAVE THESE PROBLEMS MADE IT FOR YOU TO DO YOUR WORK, TAKE CARE OF THINGS AT HOME, OR GET ALONG WITH OTHER PEOPLE: SOMEWHAT DIFFICULT
SUM OF ALL RESPONSES TO PHQ QUESTIONS 1-9: 12
10. IF YOU CHECKED OFF ANY PROBLEMS, HOW DIFFICULT HAVE THESE PROBLEMS MADE IT FOR YOU TO DO YOUR WORK, TAKE CARE OF THINGS AT HOME, OR GET ALONG WITH OTHER PEOPLE: SOMEWHAT DIFFICULT
SUM OF ALL RESPONSES TO PHQ QUESTIONS 1-9: 12
SUM OF ALL RESPONSES TO PHQ QUESTIONS 1-9: 12

## 2025-01-30 ASSESSMENT — COLUMBIA-SUICIDE SEVERITY RATING SCALE - C-SSRS
TOTAL  NUMBER OF INTERRUPTED ATTEMPTS LIFETIME: NO
1. HAVE YOU WISHED YOU WERE DEAD OR WISHED YOU COULD GO TO SLEEP AND NOT WAKE UP?: NO
TOTAL  NUMBER OF ABORTED OR SELF INTERRUPTED ATTEMPTS LIFETIME: NO
ATTEMPT LIFETIME: NO
2. HAVE YOU ACTUALLY HAD ANY THOUGHTS OF KILLING YOURSELF?: NO
6. HAVE YOU EVER DONE ANYTHING, STARTED TO DO ANYTHING, OR PREPARED TO DO ANYTHING TO END YOUR LIFE?: NO

## 2025-01-30 ASSESSMENT — ANXIETY QUESTIONNAIRES
GAD7 TOTAL SCORE: 15
6. BECOMING EASILY ANNOYED OR IRRITABLE: SEVERAL DAYS
GAD7 TOTAL SCORE: 15
2. NOT BEING ABLE TO STOP OR CONTROL WORRYING: NEARLY EVERY DAY
GAD7 TOTAL SCORE: 15
8. IF YOU CHECKED OFF ANY PROBLEMS, HOW DIFFICULT HAVE THESE MADE IT FOR YOU TO DO YOUR WORK, TAKE CARE OF THINGS AT HOME, OR GET ALONG WITH OTHER PEOPLE?: SOMEWHAT DIFFICULT
GAD7 TOTAL SCORE: 15
1. FEELING NERVOUS, ANXIOUS, OR ON EDGE: NEARLY EVERY DAY
7. FEELING AFRAID AS IF SOMETHING AWFUL MIGHT HAPPEN: NEARLY EVERY DAY
6. BECOMING EASILY ANNOYED OR IRRITABLE: SEVERAL DAYS
IF YOU CHECKED OFF ANY PROBLEMS ON THIS QUESTIONNAIRE, HOW DIFFICULT HAVE THESE PROBLEMS MADE IT FOR YOU TO DO YOUR WORK, TAKE CARE OF THINGS AT HOME, OR GET ALONG WITH OTHER PEOPLE: SOMEWHAT DIFFICULT
7. FEELING AFRAID AS IF SOMETHING AWFUL MIGHT HAPPEN: NEARLY EVERY DAY
GAD7 TOTAL SCORE: 15
4. TROUBLE RELAXING: MORE THAN HALF THE DAYS
3. WORRYING TOO MUCH ABOUT DIFFERENT THINGS: NEARLY EVERY DAY
7. FEELING AFRAID AS IF SOMETHING AWFUL MIGHT HAPPEN: NEARLY EVERY DAY
5. BEING SO RESTLESS THAT IT IS HARD TO SIT STILL: NOT AT ALL
5. BEING SO RESTLESS THAT IT IS HARD TO SIT STILL: NOT AT ALL
7. FEELING AFRAID AS IF SOMETHING AWFUL MIGHT HAPPEN: NEARLY EVERY DAY
8. IF YOU CHECKED OFF ANY PROBLEMS, HOW DIFFICULT HAVE THESE MADE IT FOR YOU TO DO YOUR WORK, TAKE CARE OF THINGS AT HOME, OR GET ALONG WITH OTHER PEOPLE?: SOMEWHAT DIFFICULT
1. FEELING NERVOUS, ANXIOUS, OR ON EDGE: NEARLY EVERY DAY
4. TROUBLE RELAXING: MORE THAN HALF THE DAYS
2. NOT BEING ABLE TO STOP OR CONTROL WORRYING: NEARLY EVERY DAY
3. WORRYING TOO MUCH ABOUT DIFFERENT THINGS: NEARLY EVERY DAY
GAD7 TOTAL SCORE: 15
IF YOU CHECKED OFF ANY PROBLEMS ON THIS QUESTIONNAIRE, HOW DIFFICULT HAVE THESE PROBLEMS MADE IT FOR YOU TO DO YOUR WORK, TAKE CARE OF THINGS AT HOME, OR GET ALONG WITH OTHER PEOPLE: SOMEWHAT DIFFICULT

## 2025-01-30 NOTE — PATIENT INSTRUCTIONS
Treatment Plan:  Continue lorazepam/Ativan 0.5 mg daily as needed for severe anxiety/panic.  Continue to use sparingly.  Increase propranolol to 20-40 mg twice daily as needed for anxiety.  Can take about 60 minutes before anxiety provoking event.  Discontinue/taper Pristiq and start sertraline/Zoloft:  Days 1-7  Decrease Pristiq to 12.5 mg daily   Start sertraline 25 mg daily  Days 8-14  Decrease Pristiq by alternating 12.5 mg with no Pristiq every other day   Increase sertraline to 50 mg daily   Days 15-21  STOP Pristiq  Increase sertraline to 75 mg daily  Days 22+   Increase sertraline to 100 mg daily as tolerated  Continue all other cares per primary care provider.   Continue all other medications as reviewed per electronic medical record today.   Safety plan reviewed. To the Emergency Department as needed or call after hours crisis line at [phone] or [phone]. [place] Crisis Text Line. Text [place] to 500599 or Suicide LifeLine Chat: suicidepreventionlifeline.org/chat  Continue therapy as planned.   Schedule an appointment with me in 2 months or sooner as needed. Call [facility] at [phone] to schedule.  Follow up with primary care provider as planned or for acute medical concerns.  Call the psychiatric nurse line with medication questions or concerns at [phone].  Cloudt[employer]art may be used to communicate with your provider, but this is not intended to be used for emergencies.    Risks of benzodiazepine (Ativan, Xanax, Klonopin, Valium, etc) use including, but not limited to, sedation, tolerance, risk for addiction/dependence. Do not drink alcohol while taking benzodiazepines due to risk of trouble breathing and potential death. Do not drive or operate heavy machinery until it is known how the drug affects you. Discuss with physician or pharmacist before ever taking a benzodiazepine with a narcotic/opioid pain medication.     Get Out of Your Mind & Into Your Life   By Hilton COLLINS  "Hill    The Happiness Trap: How to Stop Struggling and Start Living  By Sivakumar Quispe    The Reality Slap: Finding Peace & Fulfillment When Life Hurts  By Sivakumar Quispe    Things Might Go Terribly, Horribly Wrong: A Guide to Life Liberated from Anxiety  By Kaila Solorio, PhD    Stress Less, Live More: How Acceptance and Commitment Therapy Can Help You Live a Busy Yet Balanced Life  By Neal Schmitt    Finding Life Beyond Trauma: Using Acceptance and Commitment Therapy to Heal From Post-Traumatic Stress and Trauma-Related Problems  By Tahira Russell and Inge Balderas    Other ACT Skills References     Sivakumar Quispe on YouTube - Demonstrates several different skills to deal with difficult thoughts and feelings     Book:  \"A Liberated Mind: How to Pivot Toward What Matters\" by Hilton Alejandre     Psychological flexibility: How love turns pain into purpose  Tedx Talk by Dr. Alejandre discussing his struggle with anxiety and panic disorder which motivated him to develop ACT therapy (Acceptance and Commitment Therapy)     You Are Not Your Thoughts      Serotonin syndrome symptoms usually occur within several hours of taking a new drug or increasing the dose of a drug you're already taking.   Signs and symptoms include:  Agitation or restlessness  Confusion  Rapid heart rate and high blood pressure  Dilated pupils  Loss of muscle coordination or twitching muscles  Muscle rigidity  Heavy sweating  Diarrhea  Headache  Shivering  Goose bumps    Severe serotonin syndrome can be life-threatening. Signs and symptoms include:  High fever  Seizures  Irregular heartbeat  Unconsciousness    If you suspect you might have serotonin syndrome after starting a new drug or increasing the dose of a drug you're already taking, call your doctor right away or go to the emergency room. If you have severe or rapidly worsening symptoms, seek emergency treatment immediately.    There are some over-the-counter medications AND non-mental health " prescribed drugs that can cause or contribute to serotonin syndrome when you are taking a medication already that increases serotonin.     Medications and supplements (Rx'd and OTC) that can increase risk for serotonin syndrome:  https://www.[employer]/medications-and-serotonin-syndrome-7883234

## 2025-01-30 NOTE — NURSING NOTE
Is the patient currently in the state of [place]? YES    Current patient location: [address] [place] [id number]    Visit mode:Video    If the visit is dropped, the patient can be reconnected by: VIDEO VISIT: Text to cell phone:   Telephone Information:   Mobile [phone]       Will anyone else be joining the visit? No  (If patient encounters technical issues they should call [phone])    Are changes needed to the allergy or medication list? No    Are refills needed on medications prescribed by this physician? No    Rooming Documentation: Questionnaire(s) completed.    Reason for visit: Consult     CHARLOTTE Santos

## 2025-02-13 ENCOUNTER — VIRTUAL VISIT (OUTPATIENT)
Dept: PSYCHOLOGY | Facility: CLINIC | Age: 55
End: 2025-02-13
Payer: COMMERCIAL

## 2025-02-13 DIAGNOSIS — F41.1 GAD (GENERALIZED ANXIETY DISORDER): Primary | ICD-10-CM

## 2025-02-13 ASSESSMENT — ANXIETY QUESTIONNAIRES
6. BECOMING EASILY ANNOYED OR IRRITABLE: SEVERAL DAYS
2. NOT BEING ABLE TO STOP OR CONTROL WORRYING: NEARLY EVERY DAY
IF YOU CHECKED OFF ANY PROBLEMS ON THIS QUESTIONNAIRE, HOW DIFFICULT HAVE THESE PROBLEMS MADE IT FOR YOU TO DO YOUR WORK, TAKE CARE OF THINGS AT HOME, OR GET ALONG WITH OTHER PEOPLE: SOMEWHAT DIFFICULT
GAD7 TOTAL SCORE: 17
1. FEELING NERVOUS, ANXIOUS, OR ON EDGE: NEARLY EVERY DAY
GAD7 TOTAL SCORE: 17
8. IF YOU CHECKED OFF ANY PROBLEMS, HOW DIFFICULT HAVE THESE MADE IT FOR YOU TO DO YOUR WORK, TAKE CARE OF THINGS AT HOME, OR GET ALONG WITH OTHER PEOPLE?: SOMEWHAT DIFFICULT
3. WORRYING TOO MUCH ABOUT DIFFERENT THINGS: NEARLY EVERY DAY
7. FEELING AFRAID AS IF SOMETHING AWFUL MIGHT HAPPEN: NEARLY EVERY DAY
4. TROUBLE RELAXING: NEARLY EVERY DAY
5. BEING SO RESTLESS THAT IT IS HARD TO SIT STILL: SEVERAL DAYS

## 2025-02-13 NOTE — PROGRESS NOTES
"[facility]                                     Progress Note    Patient Name: Adela Stone  Date:2/13/2025         Service Type: Individual      Session Start Time:  12:00 Session End Time:12:32     Session Length: 32    Session #:4    Attendees: Client attended alone    Service Modality:  Video Visit:      Provider verified identity through the following two step process.  Patient provided:  Patient is known previously to provider    Telemedicine Visit: The patient's condition can be safely assessed and treated via synchronous audio and visual telemedicine encounter.      Reason for Telemedicine Visit: Services only offered telehealth    Originating Site (Patient Location): Patient's other Work place/office    Distant Site (Provider Location): Provider Remote Setting- Home Office    Consent:  The patient/guardian has verbally consented to: the potential risks and benefits of telemedicine (video visit) versus in person care; bill my insurance or make self-payment for services provided; and responsibility for payment of non-covered services.     Patient would like the video invitation sent by:  My Chart    Mode of Communication:  Video Conference via [employer] Also, phone call    Distant Location (Provider):  Off-site    As the provider I attest to compliance with applicable laws and regulations related to telemedicine.    DATA  Interactive Complexity: No  Crisis: No      Progress Since Last Session (Related to Symptoms / Goals / Homework):   Symptoms:  Anxious    Homework: Partially completed      Episode of Care Goals: Minimal progress - ACTION (Actively working towards change); Intervened by reinforcing change plan / affirming steps taken- ongoing anxiety.     Current / Ongoing Stressors and Concerns: Patient did not have enough time for the visit today as she had a plan to take her father to  his doctor's appointment.  Patient has been reading the book recommended at the previous visit\" Taming " "your Amygdala\" by Kassie Shepard.  She notes that this will help her to understand herself feeling more.  She has already discovered her anxiety is not purely related to things that she has no control such as being in a plane for sicknesses or even the children's behaviors.  She expressed curiosity about what now she can do since she feels getting a better understanding of this issue.  She also wanted to know the source of her anxiety.  Patient is willing to explore her past experiences she was introduced to the idea of trying EMDR modality.  She is willing to try this modality.  No other concerns discussed today given the time patient had in session.  Denied any safety concern.  Her next visit is not until March 25.  Patient will be contacted should a sooner opening is noted.     Treatment Objective(s) Addressed in This Session:   use thought-stopping strategy daily to reduce intrusive thoughts     Intervention:   CBT: Writer will continue to process the patient reported thoughts and feelings about things that she has no control of .reinforce the patient determination to discover herself, the source of her anxiety, and how to cope with it.  Provided information on EMDR a new modality that will help her to deal with her anxiety.    Safety plan: No safety concern    Assessments completed prior to visit:  The following assessments were completed by patient for this visit:  PHQ2:       12/3/2024    10:35 AM 11/2/2024     2:22 PM 8/29/2024     9:01 AM 8/22/2024     6:50 AM 5/24/2024     3:10 PM 4/15/2024     9:50 AM 4/4/2024    10:02 AM   PHQ-2 ( 1999 [employer])   Q1: Little interest or pleasure in doing things 1 1 1 1  2 1 1   Q2: Feeling down, depressed or hopeless 3 1 1 1  3 1 2   PHQ-2 Score 4  2  2 2 5 2 3   Q1: Little interest or pleasure in doing things Several days Several days Several days Several days More than half the days     Q2: Feeling down, depressed or hopeless Nearly every day Several days Several days " Several days Nearly every day     PHQ-2 Score 4 2 2 2 5         Patient-reported    Proxy-reported     PHQ9:       5/24/2024     3:09 PM 8/23/2024    11:50 AM 9/17/2024    10:26 AM 11/2/2024     2:23 PM 12/3/2024    10:35 AM 1/2/2025     6:39 PM 1/30/2025     7:55 AM   PHQ-9 SCORE   PHQ-9 Total Score MyChart 13 (Moderate depression) 7 (Mild depression) 8 (Mild depression) 7 (Mild depression) 11 (Moderate depression)  12 (Moderate depression)   PHQ-9 Total Score 13 7 8 7  11  5 12        Patient-reported     GAD2:       8/22/2024     6:50 AM 10/7/2024     8:59 AM 10/30/2024     7:07 PM 11/18/2024     8:05 AM 12/3/2024    10:36 AM 1/30/2025     7:56 AM 2/13/2025     8:17 AM   PARIS-2   Feeling nervous, anxious, or on edge 1  3 3 3 3 3 3   Not being able to stop or control worrying 1  2 3 3 3 3 3   PARIS-2 Total Score 2    2    2    2 5    5    5 6  6  6  6  6        Patient-reported    Proxy-reported     GAD7:       10/7/2024     8:59 AM 10/30/2024     7:07 PM 11/18/2024     8:05 AM 12/3/2024    10:36 AM 1/2/2025     6:39 PM 1/30/2025     7:56 AM 2/13/2025     8:17 AM   PARIS-7 SCORE   Total Score 13 (moderate anxiety) 14 (moderate anxiety) 16 (severe anxiety) 17 (severe anxiety)  15 (severe anxiety) 17 (severe anxiety)   Total Score 13    13    13 14  16  17  14 15  17        Patient-reported     CAGE-AID:       11/21/2023     8:11 AM 8/23/2024    11:53 AM 1/2/2025     6:39 PM   CAGE-AID Total Score   Total Score 0 0 0   Total Score MyChart  0 (A total score of 2 or greater is considered clinically significant)      PROMIS 10-Global Health (all questions and answers displayed):       11/21/2023     8:11 AM 4/15/2024     9:55 AM 8/23/2024    11:53 AM 11/2/2024     2:25 PM 12/3/2024    10:38 AM 1/2/2025     6:39 PM 1/30/2025     7:58 AM   PROMIS 10   In general, would you say your health is:  Good Fair Good Fair  Fair   In general, would you say your quality of life is:  Good Fair Good Fair  Fair   In general, how would you  rate your physical health?  Good Fair Good Fair  Fair   In general, how would you rate your mental health, including your mood and your ability to think?  Fair Fair Fair Fair  Fair   In general, how would you rate your satisfaction with your social activities and relationships?  Poor Poor Poor Poor  Poor   In general, please rate how well you carry out your usual social activities and roles  Fair Good Good Fair  Good   To what extent are you able to carry out your everyday physical activities such as walking, climbing stairs, carrying groceries, or moving a chair?  Completely Completely Completely Completely  Completely   In the past 7 days, how often have you been bothered by emotional problems such as feeling anxious, depressed, or irritable?  Often Always Always Always  Always   In the past 7 days, how would you rate your fatigue on average?  Mild Mild Mild Mild  Mild   In the past 7 days, how would you rate your pain on average, where 0 means no pain, and 10 means worst imaginable pain?  2 3 3 2  3   In general, would you say your health is: 3 3  2 3 2 3 2   In general, would you say your quality of life is: 3 3  2 3 2 3 2   In general, how would you rate your physical health? 3 3  2 3 2 3 2   In general, how would you rate your mental health, including your mood and your ability to think? 2 2  2 2 2 3 2   In general, how would you rate your satisfaction with your social activities and relationships? 1 1  1 1 1 3 1   In general, please rate how well you carry out your usual social activities and roles. (This includes activities at home, at work and in your community, and responsibilities as a parent, child, spouse, employee, friend, etc.) 3 2  3 3 2  3   To what extent are you able to carry out your everyday physical activities such as walking, climbing stairs, carrying groceries, or moving a chair? 5 5  5 5 5 3 5   In the past 7 days, how often have you been bothered by emotional problems such as feeling  anxious, depressed, or irritable? 5 4  5 5 5 3 5   In the past 7 days, how would you rate your fatigue on average? 2 2  2 2 2 2 2   In the past 7 days, how would you rate your pain on average, where 0 means no pain, and 10 means worst imaginable pain? 2 2  3 3 2 0 3   Global Mental Health Score 7 8 6    6 7  6  12 6    Global Physical Health Score 16 16 15    15 16  15  15 15    PROMIS TOTAL - SUBSCORES 23 24 21    21 23  21  27 21        Patient-reported    Proxy-reported     Waldo Suicide Severity Rating Scale (Lifetime/Recent)      11/21/2023     8:21 AM 8/23/2024    12:25 PM 1/30/2025     8:58 AM   Waldo Suicide Severity Rating (Lifetime/Recent)   1. Wish to be Dead (Lifetime) N N N   2. Non-Specific Active Suicidal Thoughts (Lifetime) N N N   Actual Attempt (Lifetime) N N N   Has subject engaged in non-suicidal self-injurious behavior? (Lifetime) N N N   Interrupted Attempts (Lifetime) N N N   Aborted or Self-Interrupted Attempt (Lifetime) N N N   Preparatory Acts or Behavior (Lifetime) N N N   Calculated C-SSRS Risk Score (Lifetime/Recent) No Risk Indicated No Risk Indicated No Risk Indicated         ASSESSMENT: Current Emotional / Mental Status (status of significant symptoms):   Risk status (Self / Other harm or suicidal ideation)   Patient denies current fears or concerns for personal safety.   Patient denies current or recent suicidal ideation or behaviors.   Patient denies current or recent homicidal ideation or behaviors.   Patient denies current or recent self injurious behavior or ideation.   Patient denies other safety concerns.   Patient reports there has been no change in risk factors since their last session.     Patient reports there has been no change in protective factors since their last session.     Recommended that patient call 911 or go to the local ED should there be a change in any of these risk factors     Appearance:   Appropriate    Eye Contact:   Good    Psychomotor  "Behavior: Normal    Attitude:   Cooperative    Orientation:   Person Place Time Situation   Speech    Rate / Production: Normal/ Responsive    Volume:  Normal    Mood:    Anxious    Affect:    Appropriate    Thought Content:  Clear    Thought Form:  Coherent  Logical    Insight:    Good      Medication Review:   No changes to current psychiatric medication(s)     Medication Compliance:   No     Changes in Health Issues:   None reported     Chemical Use Review:   Substance Use: Chemical use reviewed, no active concerns identified      Tobacco Use: No current tobacco use.      Diagnosis:  1. PARIS (generalized anxiety disorder)      Collateral Reports Completed:   Not Applicable    PLAN: (Patient Tasks / Therapist Tasks / Other)  Patient will continue reading the book\" Taming your Amygdala\" by Kassie Shepard  Patient will reflect on today's discussion related to EMDR modality and read the Protocol package sent via [employer]  Patient will keep up with grounding techniques as needed to alleviate her anxiety  Patient will return on March 25, subject to change if a sooner opening is noted    DAWNA Granger  ____________________________________________________________________  Individual Treatment Plan    Patient's Name: Adela Stone  YOB: 1970    Date of Creation: 10/07/2024  Date Treatment Plan Last Reviewed/Revised: 1/02/2025    DSM5 Diagnoses: 300.02 (F41.1) Generalized Anxiety Disorder or Adjustment Disorders  309.28 (F43.23) With mixed anxiety and depressed mood    Psychosocial / Contextual Factors: history of anxiety, family concerns. Stated, \"Relationship challenges with eldest adult daughter   CARLOS (reviewed every 90 days):21     Referral / Collaboration:  Was/were discussed and patient will pursue.    Anticipated number of session for this episode of care: 9-12 sessions  Anticipation frequency of session: Biweekly  Anticipated Duration of each session: 38-52 minutes  Treatment plan will " be reviewed in 90 days or when goals have been changed.     MeasurableTreatment Goal(s) related to diagnosis / functional impairment(s)  Goal 1: Patient will resolve the core conflict that is the source of anxiety and depression    I will know I've met my goal when I am able to cope with current stressors at least at 85 % of the time by the next 90 days.      Objective #A (Patient Action)    Patient will use thought-stopping strategy daily to reduce intrusive thoughts.  Status: Continued - Date(s): 1/02/2025  Intervention(s)  Therapist will teach about healthy boundaries. With daughter and focus on self care .    Objective #B  Patient will use distraction each time intrusive worry surfaces.  Status: Continued - Date(s): 1/02/2025  Intervention(s)  Therapist will assign homework practicing mindfulness exercises .    Objective #C  Patient will Decrease frequency and intensity of feeling down, depressed, hopeless.  Status: Continued - Date(s): 1/02/2025    Intervention(s)  Therapist will  teach CBT skills to challenge any cognitive traps related to current personal, family , and work stressors .    Patient has reviewed and agreed to the above plan.    DAWNA Granger  January 02,2025  Answers submitted by the patient for this visit:  Patient Health Questionnaire (G7) (Submitted on 10/7/2024)  PARIS 7 TOTAL SCORE: 13    Answers submitted by the patient for this visit:  Patient Health Questionnaire (G7) (Submitted on 2/13/2025)  PARIS 7 TOTAL SCORE: 17

## 2025-03-04 DIAGNOSIS — F41.1 GENERALIZED ANXIETY DISORDER: ICD-10-CM

## 2025-03-04 RX ORDER — DESVENLAFAXINE 25 MG/1
25 TABLET, EXTENDED RELEASE ORAL DAILY
Qty: 30 TABLET | Refills: 2 | OUTPATIENT
Start: 2025-03-04

## 2025-03-04 NOTE — TELEPHONE ENCOUNTER
Reviewed refill request for Pristiq 25mg  Reviewed documentation by MA.     This refill request will be denied for the following reasons:    pharmacy should have refill(s) on file      CAS HARRIS RN on 3/4/2025 at 1:46 PM  '

## 2025-03-04 NOTE — TELEPHONE ENCOUNTER
1) Reviewed refill request(s) from    [facility] [id number] [facility] [place] [facility] [address]    2) Any Controlled Substance(s)? No    3) Refill(s) requested for:     - desvenlafaxine succinate (PRISTIQ) 25 MG 24 hr tablet  Date last ordered: 01/30/2025 Qty: 30 Refills: 2   refill has been requested too soon      4) Action taken: routed encounter back to RN Pool for review; not within LPN/MA Scope of Practice to deny.    Dawn Aggarwal MA on 3/4/2025 at 10:40 AM

## 2025-03-24 ASSESSMENT — ANXIETY QUESTIONNAIRES
IF YOU CHECKED OFF ANY PROBLEMS ON THIS QUESTIONNAIRE, HOW DIFFICULT HAVE THESE PROBLEMS MADE IT FOR YOU TO DO YOUR WORK, TAKE CARE OF THINGS AT HOME, OR GET ALONG WITH OTHER PEOPLE: SOMEWHAT DIFFICULT
6. BECOMING EASILY ANNOYED OR IRRITABLE: NOT AT ALL
2. NOT BEING ABLE TO STOP OR CONTROL WORRYING: SEVERAL DAYS
GAD7 TOTAL SCORE: 12
GAD7 TOTAL SCORE: 12
7. FEELING AFRAID AS IF SOMETHING AWFUL MIGHT HAPPEN: NEARLY EVERY DAY
3. WORRYING TOO MUCH ABOUT DIFFERENT THINGS: NEARLY EVERY DAY
1. FEELING NERVOUS, ANXIOUS, OR ON EDGE: MORE THAN HALF THE DAYS
4. TROUBLE RELAXING: MORE THAN HALF THE DAYS
5. BEING SO RESTLESS THAT IT IS HARD TO SIT STILL: SEVERAL DAYS
8. IF YOU CHECKED OFF ANY PROBLEMS, HOW DIFFICULT HAVE THESE MADE IT FOR YOU TO DO YOUR WORK, TAKE CARE OF THINGS AT HOME, OR GET ALONG WITH OTHER PEOPLE?: SOMEWHAT DIFFICULT
GAD7 TOTAL SCORE: 12
7. FEELING AFRAID AS IF SOMETHING AWFUL MIGHT HAPPEN: NEARLY EVERY DAY

## 2025-03-25 ENCOUNTER — VIRTUAL VISIT (OUTPATIENT)
Dept: PSYCHOLOGY | Facility: CLINIC | Age: 55
End: 2025-03-25
Payer: COMMERCIAL

## 2025-03-25 DIAGNOSIS — F41.1 GAD (GENERALIZED ANXIETY DISORDER): Primary | ICD-10-CM

## 2025-03-25 PROCEDURE — 90834 PSYTX W PT 45 MINUTES: CPT | Mod: 95 | Performed by: SOCIAL WORKER

## 2025-03-25 NOTE — PROGRESS NOTES
[facility]                                     Progress Note    Patient Name: Adela Stone  Date:3/25/2025         Service Type: Individual      Session Start Time:  14:02 Session End Time:14:47     Session Length: 45    Session #:5    Attendees: Client attended alone    Service Modality:  Video Visit:      Provider verified identity through the following two step process.  Patient provided:  Patient is known previously to provider    Telemedicine Visit: The patient's condition can be safely assessed and treated via synchronous audio and visual telemedicine encounter.      Reason for Telemedicine Visit: Services only offered telehealth    Originating Site (Patient Location): Patient's other Work place/office    Distant Site (Provider Location): Provider Remote Setting- Home Office    Consent:  The patient/guardian has verbally consented to: the potential risks and benefits of telemedicine (video visit) versus in person care; bill my insurance or make self-payment for services provided; and responsibility for payment of non-covered services.     Patient would like the video invitation sent by:  My Chart    Mode of Communication:  Video Conference via [facility]well Also, phone call    Distant Location (Provider):  Off-site    As the provider I attest to compliance with applicable laws and regulations related to telemedicine.    DATA  Interactive Complexity: No  Crisis: No      Progress Since Last Session (Related to Symptoms / Goals / Homework):   Symptoms:  Anxious    Homework: Partially completed      Episode of Care Goals: Minimal progress - ACTION (Actively working towards change); Intervened by reinforcing change plan / affirming steps taken- some progress understanding her anxiety     Current / Ongoing Stressors and Concerns: Patient has been busy. Let writer know she won't stay longer due to the work. Has been trying to get off her current anxiety medications( Prestiq). She has been working on  understanding anxiety and its connection with her digestive system. She will then replace it with a different medication should she need to. Does not think it is time for her to start EMDR.  Will need time to dedicate in therapy should she want to try EMDR. She is considering doing so in the future. In meantime, she will continue to use the skills in sent via my charts and readings including Taming Your Amygdala.  Patient shared her anxiety is chronic since she was a kid. She has now come to understand it better and how it is manageable with coping skills and also medications. No safety concerns. Her next visit is in 2 weeks.      Treatment Objective(s) Addressed in This Session:   use thought-stopping strategy daily to reduce intrusive thoughts     Intervention:   CBT: Reviewed patient's progress and reinforced them. Provided space and time for the patient to share her understanding  around her irrational thinking about her daily life which has been causing anxiety.  Encouraged patient to consider EMDR should she has enough time for the process.       Safety plan: no safety concern    Assessments completed prior to visit:  The following assessments were completed by patient for this visit:  PHQ2:       3/24/2025     2:55 PM 12/3/2024    10:35 AM 11/2/2024     2:22 PM 8/29/2024     9:01 AM 8/22/2024     6:50 AM 5/24/2024     3:10 PM 4/15/2024     9:50 AM   PHQ-2 ( 1999 [employer])   Q1: Little interest or pleasure in doing things 1 1 1 1 1  2 1   Q2: Feeling down, depressed or hopeless 1 3 1 1 1  3 1   PHQ-2 Score 2  4  2  2 2 5 2   Q1: Little interest or pleasure in doing things Several days Several days Several days Several days Several days More than half the days    Q2: Feeling down, depressed or hopeless Several days Nearly every day Several days Several days Several days Nearly every day    PHQ-2 Score 2 4 2 2 2 5        Patient-reported    Proxy-reported     PHQ9:       5/24/2024     3:09 PM 8/23/2024    11:50 AM  9/17/2024    10:26 AM 11/2/2024     2:23 PM 12/3/2024    10:35 AM 1/2/2025     6:39 PM 1/30/2025     7:55 AM   PHQ-9 SCORE   PHQ-9 Total Score MyChart 13 (Moderate depression) 7 (Mild depression) 8 (Mild depression) 7 (Mild depression) 11 (Moderate depression)  12 (Moderate depression)   PHQ-9 Total Score 13 7 8 7  11  5 12        Patient-reported     GAD2:       10/7/2024     8:59 AM 10/30/2024     7:07 PM 11/18/2024     8:05 AM 12/3/2024    10:36 AM 1/30/2025     7:56 AM 2/13/2025     8:17 AM 3/24/2025     2:59 PM   PARIS-2   Feeling nervous, anxious, or on edge 3 3 3 3 3 3 2   Not being able to stop or control worrying 2 3 3 3 3 3 1   PARIS-2 Total Score 5    5    5 6  6  6  6  6  3        Patient-reported     GAD7:       10/30/2024     7:07 PM 11/18/2024     8:05 AM 12/3/2024    10:36 AM 1/2/2025     6:39 PM 1/30/2025     7:56 AM 2/13/2025     8:17 AM 3/24/2025     2:59 PM   PARIS-7 SCORE   Total Score 14 (moderate anxiety) 16 (severe anxiety) 17 (severe anxiety)  15 (severe anxiety) 17 (severe anxiety) 12 (moderate anxiety)   Total Score 14  16  17  14 15  17  12        Patient-reported     CAGE-AID:       11/21/2023     8:11 AM 8/23/2024    11:53 AM 1/2/2025     6:39 PM   CAGE-AID Total Score   Total Score 0 0 0   Total Score MyChart  0 (A total score of 2 or greater is considered clinically significant)      PROMIS 10-Global Health (all questions and answers displayed):       11/21/2023     8:11 AM 4/15/2024     9:55 AM 8/23/2024    11:53 AM 11/2/2024     2:25 PM 12/3/2024    10:38 AM 1/2/2025     6:39 PM 1/30/2025     7:58 AM   PROMIS 10   In general, would you say your health is:  Good Fair Good Fair  Fair   In general, would you say your quality of life is:  Good Fair Good Fair  Fair   In general, how would you rate your physical health?  Good Fair Good Fair  Fair   In general, how would you rate your mental health, including your mood and your ability to think?  Fair Fair Fair Fair  Fair   In general, how would  you rate your satisfaction with your social activities and relationships?  Poor Poor Poor Poor  Poor   In general, please rate how well you carry out your usual social activities and roles  Fair Good Good Fair  Good   To what extent are you able to carry out your everyday physical activities such as walking, climbing stairs, carrying groceries, or moving a chair?  Completely Completely Completely Completely  Completely   In the past 7 days, how often have you been bothered by emotional problems such as feeling anxious, depressed, or irritable?  Often Always Always Always  Always   In the past 7 days, how would you rate your fatigue on average?  Mild Mild Mild Mild  Mild   In the past 7 days, how would you rate your pain on average, where 0 means no pain, and 10 means worst imaginable pain?  2 3 3 2  3   In general, would you say your health is: 3 3  2 3 2 3 2   In general, would you say your quality of life is: 3 3  2 3 2 3 2   In general, how would you rate your physical health? 3 3  2 3 2 3 2   In general, how would you rate your mental health, including your mood and your ability to think? 2 2  2 2 2 3 2   In general, how would you rate your satisfaction with your social activities and relationships? 1 1  1 1 1 3 1   In general, please rate how well you carry out your usual social activities and roles. (This includes activities at home, at work and in your community, and responsibilities as a parent, child, spouse, employee, friend, etc.) 3 2  3 3 2  3   To what extent are you able to carry out your everyday physical activities such as walking, climbing stairs, carrying groceries, or moving a chair? 5 5  5 5 5 3 5   In the past 7 days, how often have you been bothered by emotional problems such as feeling anxious, depressed, or irritable? 5 4  5 5 5 3 5   In the past 7 days, how would you rate your fatigue on average? 2 2  2 2 2 2 2   In the past 7 days, how would you rate your pain on average, where 0 means no  pain, and 10 means worst imaginable pain? 2 2  3 3 2 0 3   Global Mental Health Score 7 8 6    6 7  6  12 6    Global Physical Health Score 16 16 15    15 16  15  15 15    PROMIS TOTAL - SUBSCORES 23 24 21    21 23  21  27 21        Patient-reported    Proxy-reported     [place] Suicide Severity Rating Scale (Lifetime/Recent)      11/21/2023     8:21 AM 8/23/2024    12:25 PM 1/30/2025     8:58 AM   [place] Suicide Severity Rating (Lifetime/Recent)   1. Wish to be Dead (Lifetime) N N N   2. Non-Specific Active Suicidal Thoughts (Lifetime) N N N   Actual Attempt (Lifetime) N N N   Has subject engaged in non-suicidal self-injurious behavior? (Lifetime) N N N   Interrupted Attempts (Lifetime) N N N   Aborted or Self-Interrupted Attempt (Lifetime) N N N   Preparatory Acts or Behavior (Lifetime) N N N   Calculated C-SSRS Risk Score (Lifetime/Recent) No Risk Indicated No Risk Indicated No Risk Indicated         ASSESSMENT: Current Emotional / Mental Status (status of significant symptoms):   Risk status (Self / Other harm or suicidal ideation)   Patient denies current fears or concerns for personal safety.   Patient denies current or recent suicidal ideation or behaviors.   Patient denies current or recent homicidal ideation or behaviors.   Patient denies current or recent self injurious behavior or ideation.   Patient denies other safety concerns.   Patient reports there has been no change in risk factors since their last session.     Patient reports there has been no change in protective factors since their last session.     Recommended that patient call 911 or go to the local ED should there be a change in any of these risk factors     Appearance:   Appropriate    Eye Contact:   Good    Psychomotor Behavior: Normal    Attitude:   Cooperative    Orientation:   Person Place Time Situation   Speech    Rate / Production: Normal/ Responsive    Volume:  Normal    Mood:    Anxious    Affect:    Appropriate    Thought  "Content:  Clear    Thought Form:  Coherent  Logical    Insight:    Good      Medication Review:   No changes to current psychiatric medication(s)     Medication Compliance:   No     Changes in Health Issues:   None reported     Chemical Use Review:   Substance Use: Chemical use reviewed, no active concerns identified      Tobacco Use: No current tobacco use.      Diagnosis:  1. PARIS (generalized anxiety disorder)      Collateral Reports Completed:   Not Applicable    PLAN: (Patient Tasks / Therapist Tasks / Other)  Patient will continue to read her book \" Taming your Amygdala \" by Kassie Shepard  Patient will keep up with grounding techniques as needed to alleviate her anxiety  Patient will return on 4/14.   Geraldo Chow, LICSW  ____________________________________________________________________  Individual Treatment Plan    Patient's Name: Adela Stone  YOB: 1970    Date of Creation: 10/07/2024  Date Treatment Plan Last Reviewed/Revised: 1/02/2025    DSM5 Diagnoses: 300.02 (F41.1) Generalized Anxiety Disorder or Adjustment Disorders  309.28 (F43.23) With mixed anxiety and depressed mood    Psychosocial / Contextual Factors: history of anxiety, family concerns. Stated, \"Relationship challenges with eldest adult daughter   CARLOS (reviewed every 90 days):21     Referral / Collaboration:  Was/were discussed and patient will pursue.    Anticipated number of session for this episode of care: 9-12 sessions  Anticipation frequency of session: Biweekly  Anticipated Duration of each session: 38-52 minutes  Treatment plan will be reviewed in 90 days or when goals have been changed.     MeasurableTreatment Goal(s) related to diagnosis / functional impairment(s)  Goal 1: Patient will resolve the core conflict that is the source of anxiety and depression    I will know I've met my goal when I am able to cope with current stressors at least at 85 % of the time by the next 90 days.      Objective #A " (Patient Action)    Patient will use thought-stopping strategy daily to reduce intrusive thoughts.  Status: Continued - Date(s): 1/02/2025  Intervention(s)  Therapist will teach about healthy boundaries. With daughter and focus on self care .    Objective #B  Patient will use distraction each time intrusive worry surfaces.  Status: Continued - Date(s): 1/02/2025  Intervention(s)  Therapist will assign homework practicing mindfulness exercises .    Objective #C  Patient will Decrease frequency and intensity of feeling down, depressed, hopeless.  Status: Continued - Date(s): 1/02/2025    Intervention(s)  Therapist will  teach CBT skills to challenge any cognitive traps related to current personal, family , and work stressors .    Patient has reviewed and agreed to the above plan.    DAWNA Granger  January 02,2025  Answers submitted by the patient for this visit:  Patient Health Questionnaire (G7) (Submitted on 10/7/2024)  PARIS 7 TOTAL SCORE: 13    Answers submitted by the patient for this visit:  Patient Health Questionnaire (G7) (Submitted on 2/13/2025)  PARIS 7 TOTAL SCORE: 17    Answers submitted by the patient for this visit:  Patient Health Questionnaire (G7) (Submitted on 3/24/2025)  PARIS 7 TOTAL SCORE: 12

## 2025-04-09 ENCOUNTER — TELEPHONE (OUTPATIENT)
Dept: PSYCHOLOGY | Facility: CLINIC | Age: 55
End: 2025-04-09
Payer: COMMERCIAL

## 2025-04-10 NOTE — TELEPHONE ENCOUNTER
Client sent psychotherapist email requesting on guidance for her son to find a therapist. Directions given

## 2025-04-11 ENCOUNTER — MYC REFILL (OUTPATIENT)
Dept: PSYCHIATRY | Facility: CLINIC | Age: 55
End: 2025-04-11
Payer: COMMERCIAL

## 2025-04-11 DIAGNOSIS — F40.10 SOCIAL ANXIETY DISORDER: ICD-10-CM

## 2025-04-14 ENCOUNTER — VIRTUAL VISIT (OUTPATIENT)
Dept: PSYCHOLOGY | Facility: CLINIC | Age: 55
End: 2025-04-14
Payer: COMMERCIAL

## 2025-04-14 DIAGNOSIS — F41.1 GAD (GENERALIZED ANXIETY DISORDER): Primary | ICD-10-CM

## 2025-04-14 PROCEDURE — 90834 PSYTX W PT 45 MINUTES: CPT | Mod: 95 | Performed by: SOCIAL WORKER

## 2025-04-14 RX ORDER — PROPRANOLOL HCL 20 MG
TABLET ORAL
Qty: 30 TABLET | Refills: 2 | Status: SHIPPED | OUTPATIENT
Start: 2025-04-14

## 2025-04-14 ASSESSMENT — PATIENT HEALTH QUESTIONNAIRE - PHQ9
10. IF YOU CHECKED OFF ANY PROBLEMS, HOW DIFFICULT HAVE THESE PROBLEMS MADE IT FOR YOU TO DO YOUR WORK, TAKE CARE OF THINGS AT HOME, OR GET ALONG WITH OTHER PEOPLE: SOMEWHAT DIFFICULT
SUM OF ALL RESPONSES TO PHQ QUESTIONS 1-9: 12
SUM OF ALL RESPONSES TO PHQ QUESTIONS 1-9: 12

## 2025-04-14 ASSESSMENT — ANXIETY QUESTIONNAIRES
7. FEELING AFRAID AS IF SOMETHING AWFUL MIGHT HAPPEN: NEARLY EVERY DAY
7. FEELING AFRAID AS IF SOMETHING AWFUL MIGHT HAPPEN: NEARLY EVERY DAY
GAD7 TOTAL SCORE: 19
6. BECOMING EASILY ANNOYED OR IRRITABLE: MORE THAN HALF THE DAYS
GAD7 TOTAL SCORE: 19
2. NOT BEING ABLE TO STOP OR CONTROL WORRYING: NEARLY EVERY DAY
4. TROUBLE RELAXING: NEARLY EVERY DAY
5. BEING SO RESTLESS THAT IT IS HARD TO SIT STILL: MORE THAN HALF THE DAYS
3. WORRYING TOO MUCH ABOUT DIFFERENT THINGS: NEARLY EVERY DAY
IF YOU CHECKED OFF ANY PROBLEMS ON THIS QUESTIONNAIRE, HOW DIFFICULT HAVE THESE PROBLEMS MADE IT FOR YOU TO DO YOUR WORK, TAKE CARE OF THINGS AT HOME, OR GET ALONG WITH OTHER PEOPLE: SOMEWHAT DIFFICULT
8. IF YOU CHECKED OFF ANY PROBLEMS, HOW DIFFICULT HAVE THESE MADE IT FOR YOU TO DO YOUR WORK, TAKE CARE OF THINGS AT HOME, OR GET ALONG WITH OTHER PEOPLE?: SOMEWHAT DIFFICULT
1. FEELING NERVOUS, ANXIOUS, OR ON EDGE: NEARLY EVERY DAY
GAD7 TOTAL SCORE: 19

## 2025-04-14 NOTE — PROGRESS NOTES
[facility]                                     Progress Note    Patient Name: Adela Stone  Date:4/14/2025         Service Type: Individual      Session Start Time:  8:16 Session End Time:9:00     Session Length: 44    Session # 6    Attendees: Client attended alone    Service Modality:  Video Visit:      Provider verified identity through the following two step process.  Patient provided:  Patient is known previously to provider    Telemedicine Visit: The patient's condition can be safely assessed and treated via synchronous audio and visual telemedicine encounter.      Reason for Telemedicine Visit: Services only offered telehealth    Originating Site (Patient Location): Patient's other :work place    Distant Site (Provider Location): Provider Remote Setting- Home Office    Consent:  The patient/guardian has verbally consented to: the potential risks and benefits of telemedicine (video visit) versus in person care; bill my insurance or make self-payment for services provided; and responsibility for payment of non-covered services.     Patient would like the video invitation sent by:  Text to cell phone:   [phone]     Mode of Communication:  Video Conference via [employer]     Distant Location (Provider):  Off-site    As the provider I attest to compliance with applicable laws and regulations related to telemedicine.    DATA  Interactive Complexity: No  Crisis: No      Progress Since Last Session (Related to Symptoms / Goals / Homework):   Symptoms:  Anxious    Homework: Partially completed      Episode of Care Goals: Minimal progress - ACTION (Actively working towards change); Intervened by reinforcing change plan / affirming steps taken- ongoing anxiety     Current / Ongoing Stressors and Concerns:  patient has been experiencing anxiety. Notes really it is hard to shot down her worries. Also notes the only thing she can control is is herself.no longer takes anti anxiety medication.  Feelings shaky and tingling in her body.  Discussed the needs to review things differently.  Discussed how to keep in touch with her body. Familiar with taping and Yoga. Agreed much of her past experiences keeps her hostage and therefore, reviewing her past experiences and reprocess them can allow her to worry less. Will review the skills sent via [employer] that she has not been do review. Notes anxiety is in the family due to parents' alcoholism growing up. Would have tried EMDR should she had enough time on her schedule. No safety concern. Her next visit is in a month.      Treatment Objective(s) Addressed in This Session:   use thought-stopping strategy daily to reduce intrusive thoughts     Intervention:   CBT: Reviewed how the CBT skills are being practiced. Encouraged the patient to review her tools and practice them since she has not done so.      Safety plan: no safety concern today    Assessments completed prior to visit:  The following assessments were completed by patient for this visit:  PHQ2:       4/14/2025     6:10 AM 3/24/2025     2:55 PM 12/3/2024    10:35 AM 11/2/2024     2:22 PM 8/29/2024     9:01 AM 8/22/2024     6:50 AM 5/24/2024     3:10 PM   PHQ-2 ( 1999 [employer])   Q1: Little interest or pleasure in doing things 2 1 1 1 1 1  2   Q2: Feeling down, depressed or hopeless 2 1 3 1 1 1  3   PHQ-2 Score 4  2  4  2  2 2 5   Q1: Little interest or pleasure in doing things More than half the days Several days Several days Several days Several days Several days More than half the days   Q2: Feeling down, depressed or hopeless More than half the days Several days Nearly every day Several days Several days Several days Nearly every day   PHQ-2 Score 4 2 4 2 2 2 5       Patient-reported    Proxy-reported     PHQ9:       8/23/2024    11:50 AM 9/17/2024    10:26 AM 11/2/2024     2:23 PM 12/3/2024    10:35 AM 1/2/2025     6:39 PM 1/30/2025     7:55 AM 4/14/2025     6:10 AM   PHQ-9 SCORE   PHQ-9 Total Score  MyChart 7 (Mild depression) 8 (Mild depression) 7 (Mild depression) 11 (Moderate depression)  12 (Moderate depression) 12 (Moderate depression)   PHQ-9 Total Score 7 8 7  11  5 12  12        Patient-reported     GAD2:       10/30/2024     7:07 PM 11/18/2024     8:05 AM 12/3/2024    10:36 AM 1/30/2025     7:56 AM 2/13/2025     8:17 AM 3/24/2025     2:59 PM 4/14/2025     6:11 AM   PARIS-2   Feeling nervous, anxious, or on edge 3 3 3 3 3 2 3   Not being able to stop or control worrying 3 3 3 3 3 1 3   PARIS-2 Total Score 6  6  6  6  6  3  6        Patient-reported     GAD7:       11/18/2024     8:05 AM 12/3/2024    10:36 AM 1/2/2025     6:39 PM 1/30/2025     7:56 AM 2/13/2025     8:17 AM 3/24/2025     2:59 PM 4/14/2025     6:11 AM   PARIS-7 SCORE   Total Score 16 (severe anxiety) 17 (severe anxiety)  15 (severe anxiety) 17 (severe anxiety) 12 (moderate anxiety) 19 (severe anxiety)   Total Score 16  17  14 15  17  12  19        Patient-reported     CAGE-AID:       11/21/2023     8:11 AM 8/23/2024    11:53 AM 1/2/2025     6:39 PM   CAGE-AID Total Score   Total Score 0 0 0   Total Score MyChart  0 (A total score of 2 or greater is considered clinically significant)      PROMIS 10-Global Health (all questions and answers displayed):       11/21/2023     8:11 AM 4/15/2024     9:55 AM 8/23/2024    11:53 AM 11/2/2024     2:25 PM 12/3/2024    10:38 AM 1/2/2025     6:39 PM 1/30/2025     7:58 AM   PROMIS 10   In general, would you say your health is:  Good Fair Good Fair  Fair   In general, would you say your quality of life is:  Good Fair Good Fair  Fair   In general, how would you rate your physical health?  Good Fair Good Fair  Fair   In general, how would you rate your mental health, including your mood and your ability to think?  Fair Fair Fair Fair  Fair   In general, how would you rate your satisfaction with your social activities and relationships?  Poor Poor Poor Poor  Poor   In general, please rate how well you carry out  your usual social activities and roles  Fair Good Good Fair  Good   To what extent are you able to carry out your everyday physical activities such as walking, climbing stairs, carrying groceries, or moving a chair?  Completely Completely Completely Completely  Completely   In the past 7 days, how often have you been bothered by emotional problems such as feeling anxious, depressed, or irritable?  Often Always Always Always  Always   In the past 7 days, how would you rate your fatigue on average?  Mild Mild Mild Mild  Mild   In the past 7 days, how would you rate your pain on average, where 0 means no pain, and 10 means worst imaginable pain?  2 3 3 2  3   In general, would you say your health is: 3 3  2 3 2 3 2   In general, would you say your quality of life is: 3 3  2 3 2 3 2   In general, how would you rate your physical health? 3 3  2 3 2 3 2   In general, how would you rate your mental health, including your mood and your ability to think? 2 2  2 2 2 3 2   In general, how would you rate your satisfaction with your social activities and relationships? 1 1  1 1 1 3 1   In general, please rate how well you carry out your usual social activities and roles. (This includes activities at home, at work and in your community, and responsibilities as a parent, child, spouse, employee, friend, etc.) 3 2  3 3 2  3   To what extent are you able to carry out your everyday physical activities such as walking, climbing stairs, carrying groceries, or moving a chair? 5 5  5 5 5 3 5   In the past 7 days, how often have you been bothered by emotional problems such as feeling anxious, depressed, or irritable? 5 4  5 5 5 3 5   In the past 7 days, how would you rate your fatigue on average? 2 2  2 2 2 2 2   In the past 7 days, how would you rate your pain on average, where 0 means no pain, and 10 means worst imaginable pain? 2 2  3 3 2 0 3   Global Mental Health Score 7 8 6    6 7  6  12 6    Global Physical Health Score 16 16 15     15 16  15  15 15    PROMIS TOTAL - SUBSCORES 23 24 21    21 23  21  27 21        Patient-reported    Proxy-reported     [place] Suicide Severity Rating Scale (Lifetime/Recent)      11/21/2023     8:21 AM 8/23/2024    12:25 PM 1/30/2025     8:58 AM   [place] Suicide Severity Rating (Lifetime/Recent)   1. Wish to be Dead (Lifetime) N N N   2. Non-Specific Active Suicidal Thoughts (Lifetime) N N N   Actual Attempt (Lifetime) N N N   Has subject engaged in non-suicidal self-injurious behavior? (Lifetime) N N N   Interrupted Attempts (Lifetime) N N N   Aborted or Self-Interrupted Attempt (Lifetime) N N N   Preparatory Acts or Behavior (Lifetime) N N N   Calculated C-SSRS Risk Score (Lifetime/Recent) No Risk Indicated No Risk Indicated No Risk Indicated         ASSESSMENT: Current Emotional / Mental Status (status of significant symptoms):   Risk status (Self / Other harm or suicidal ideation)   Patient denies current fears or concerns for personal safety.   Patient denies current or recent suicidal ideation or behaviors.   Patient denies current or recent homicidal ideation or behaviors.   Patient denies current or recent self injurious behavior or ideation.   Patient denies other safety concerns.   Patient reports there has been no change in risk factors since their last session.     Patient reports there has been no change in protective factors since their last session.     Recommended that patient call 911 or go to the local ED should there be a change in any of these risk factors     Appearance:   Appropriate    Eye Contact:   Good    Psychomotor Behavior: Normal    Attitude:   Cooperative    Orientation:   Person Place Time Situation   Speech    Rate / Production: Normal/ Responsive    Volume:  Normal    Mood:    Anxious    Affect:    Appropriate    Thought Content:  Clear    Thought Form:  Coherent  Logical    Insight:    Good      Medication Review:   No changes to current psychiatric  "medication(s)     Medication Compliance:   No     Changes in Health Issues:   None reported     Chemical Use Review:   Substance Use: Chemical use reviewed, no active concerns identified      Tobacco Use: No current tobacco use.      Diagnosis:  1. PARIS (generalized anxiety disorder)      Collateral Reports Completed:   Not Applicable    PLAN: (Patient Tasks / Therapist Tasks / Other)  Patient will continue to read her book \" Taming your Amygdala \" by Kassie Shepard  Patient will keep up with grounding techniques as needed to alleviate her anxiety  Patient will return on 4/14.   Geraldo Chow, LICSW  ____________________________________________________________________  Individual Treatment Plan    Patient's Name: Adela Stone  YOB: 1970    Date of Creation: 10/07/2024  Date Treatment Plan Last Reviewed/Revised: 4/14/2025    DSM5 Diagnoses: 300.02 (F41.1) Generalized Anxiety Disorder or Adjustment Disorders  309.28 (F43.23) With mixed anxiety and depressed mood    Psychosocial / Contextual Factors: history of anxiety, family concerns. Stated, \"Relationship challenges with eldest adult daughter   PROMIS (reviewed every 90 days):21     Referral / Collaboration:  Was/were discussed and patient will pursue.    Anticipated number of session for this episode of care: 9-12 sessions  Anticipation frequency of session: Biweekly  Anticipated Duration of each session: 38-52 minutes  Treatment plan will be reviewed in 90 days or when goals have been changed.     MeasurableTreatment Goal(s) related to diagnosis / functional impairment(s)  Goal 1: Patient will resolve the core conflict that is the source of anxiety and depression    I will know I've met my goal when I am able to cope with current stressors at least at 85 % of the time by the next 90 days.      Objective #A (Patient Action)    Patient will use thought-stopping strategy daily to reduce intrusive thoughts.  Status: Continued - Date(s): " 4/14/2025  Intervention(s)  Therapist will teach about healthy boundaries. With daughter and focus on self care .    Objective #B  Patient will use distraction each time intrusive worry surfaces.  Status: Continued - Date(s): 4/14/2025  Intervention(s)  Therapist will assign homework practicing mindfulness exercises .    Objective #C  Patient will Decrease frequency and intensity of feeling down, depressed, hopeless.  Status: Continued - Date(s): 4/14/2025    Intervention(s)  Therapist will  teach CBT skills to challenge any cognitive traps related to current personal, family , and work stressors .    Patient has reviewed and agreed to the above plan.    DAWNA Granger  April 14/2025  Answers submitted by the patient for this visit:  Patient Health Questionnaire (G7) (Submitted on 10/7/2024)  PARIS 7 TOTAL SCORE: 13    Answers submitted by the patient for this visit:  Patient Health Questionnaire (G7) (Submitted on 2/13/2025)  PARIS 7 TOTAL SCORE: 17    Answers submitted by the patient for this visit:  Patient Health Questionnaire (G7) (Submitted on 3/24/2025)  PARIS 7 TOTAL SCORE: 12    Answers submitted by the patient for this visit:  Patient Health Questionnaire (Submitted on 4/14/2025)  If you checked off any problems, how difficult have these problems made it for you to do your work, take care of things at home, or get along with other people?: Somewhat difficult  PHQ9 TOTAL SCORE: 12  Patient Health Questionnaire (G7) (Submitted on 4/14/2025)  PARIS 7 TOTAL SCORE: 19

## 2025-04-14 NOTE — TELEPHONE ENCOUNTER
Date of Last Office Visit: 1/30/25  Date of Next Office Visit:  5/14/25  No shows since last visit: No  More than one patient-initiated cancellation (with reschedule) since last seen in clinic? No    []Medication refilled per  Medication Refill in Ambulatory Care  policy.  [x]Medication unable to be refilled by RN due to criteria not met as indicated below:    []Eligibility: has not had a provider visit within last 6 months   []Supervision: no future appointment; < 7 days before next appointment   []Compliance: no shows; cancellations; lapse in therapy   [x]Verification: order discrepancy; may need modification...   [x] > 30-day supply request - 90 day supply   []Advanced refill request: > 7 days before refill date   []Controlled medication   []Medication not included in policy   []Review: new med; med adjusted <= 30 days; safety alert; requires lab monitoring...   []Scope of Practice: refill request processed by LPN/MA   [x]Other: Current sig on requested refill needs modification to match current treatment plan - Increase propranolol to 20-40 mg twice daily as needed      Medication(s) requested:     -  propranolol (INDERAL) 20 MG tablet  Date last ordered: 4/15/24  Qty: 30  Refills: 2  Appropriate for refill? Yes    Any Controlled Substance(s)? No      Requested medication(s) verified as identical to current order? Yes    Any lapse in adherence to medication(s) greater than 5 days? N/A - PRN    Additional action taken? cued up medication/order(s) and routed encounter to provider for review.      Last visit treatment plan:       1/30/25      Treatment Plan:  Continue lorazepam/Ativan 0.5 mg daily as needed for severe anxiety/panic.  Continue to use sparingly.  Increase propranolol to 20-40 mg twice daily as needed for anxiety.  Can take about 60 minutes before anxiety provoking event.  Discontinue/taper Pristiq and start sertraline/Zoloft:  Days 1-7  Decrease Pristiq to 12.5 mg daily   Start sertraline 25 mg  daily  Days 8-14  Decrease Pristiq by alternating 12.5 mg with no Pristiq every other day   Increase sertraline to 50 mg daily   Days 15-21  STOP Pristiq  Increase sertraline to 75 mg daily  Days 22+   Increase sertraline to 100 mg daily as tolerated  Continue all other cares per primary care provider.   Continue all other medications as reviewed per electronic medical record today.   Safety plan reviewed. To the Emergency Department as needed or call after hours crisis line at [phone] or [phone]. [place] Crisis Text Line. Text [place] to 465702 or Suicide LifeLine Chat: suicidepreventionlifeline.org/chat  Continue therapy as planned.   Schedule an appointment with me in 2 months or sooner as needed. Call [facility] at [phone] to schedule.  Follow up with primary care provider as planned or for acute medical concerns.  Call the psychiatric nurse line with medication questions or concerns at [phone].  [employer] may be used to communicate with your provider, but this is not intended to be used for emergencies.      Any medication(s) require lab monitoring? No    Zachariah Muller RN on 4/14/2025 at 8:42 AM

## 2025-04-28 ENCOUNTER — VIRTUAL VISIT (OUTPATIENT)
Dept: PSYCHOLOGY | Facility: CLINIC | Age: 55
End: 2025-04-28
Payer: COMMERCIAL

## 2025-04-28 DIAGNOSIS — F43.23 ADJUSTMENT DISORDER WITH MIXED ANXIETY AND DEPRESSED MOOD: Primary | ICD-10-CM

## 2025-04-28 DIAGNOSIS — F41.1 GAD (GENERALIZED ANXIETY DISORDER): ICD-10-CM

## 2025-04-28 PROCEDURE — 90837 PSYTX W PT 60 MINUTES: CPT | Mod: 95 | Performed by: SOCIAL WORKER

## 2025-04-29 NOTE — PROGRESS NOTES
Discharge Summary  Multiple Sessions    Client Name: Adela Stone MRN#: [id number] YOB: 1970    Discharge Date:   April 28, 2025    Service Modality: Video Visit:      Provider verified identity through the following two step process.  Patient provided:  Patient is known previously to provider    Telemedicine Visit: The patient's condition can be safely assessed and treated via synchronous audio and visual telemedicine encounter.      Reason for Telemedicine Visit: Services only offered telehealth    Originating Site (Patient Location): Patient's home    Distant Site (Provider Location): Provider Remote Setting- Home Office    Consent:  The patient/guardian has verbally consented to: the potential risks and benefits of telemedicine (video visit) versus in person care; bill my insurance or make self-payment for services provided; and responsibility for payment of non-covered services.     Patient would like the video invitation sent by:  My Chart    Mode of Communication:  Video Conference via Amwell    Distant Location (Provider):  Off-site    As the provider I attest to compliance with applicable laws and regulations related to telemedicine.    Service Type: Individual     Session Start Time: 8:03 Session End Time: 8:56      Session Length: 53     Session #:7     Attendees: Client attended alone    Focus of Treatment Objective(s):  Client's presenting concerns included: Anxiety - per history  Adjustment Difficulties related to: family concerns  Stage of Change at time of Discharge: ACTION (Actively working towards change)    Medication Adherence:  NA- has stopped taking her anti anxiety this time    Chemical Use:  NA    Assessment: Current Emotional / Mental Status (status of significant symptoms):    Risk status (Self / Other harm or suicidal ideation)  Client denies current fears or concerns for personal safety.  Client denies current or recent suicidal ideation or  "behaviors.  Client denies current or recent homicidal ideation or behaviors.  Client denies current or recent self injurious behavior or ideation.  Client denies other safety concerns.  A safety and risk management plan has not been developed at this time, however client was given the after-hours number should there be a change in any of these risk factors.    Appearance:   Appropriate   Eye Contact:   Good   Psychomotor Behavior: Normal   Attitude:   Cooperative   Orientation:   Person Place Time Situation  Speech   Rate / Production: Normal/ Responsive Normal    Volume:  Normal   Mood:    Anxious   Affect:    Appropriate   Thought Content:  Clear   Thought Form:  Coherent  Logical   Insight:   Good     DSM5 Diagnoses: (Sustained by DSM5 Criteria Listed Above)  Diagnoses: 300.02 (F41.1) Generalized Anxiety Disorder  Adjustment Disorders  309.24 (F43.22) With anxiety  Psychosocial & Contextual Factors: Patient's initial concern as noted in her DA completed on 8/29/2024: \"Relationship challenges with eldest adult daughter\".  This was around the time her 19 year old daughter came to her and discussed about their past including her childhood experience and mental health. Patient wanted to reconcile with her daughter\"  It was also shared that patient's anxiety has been present since childhood; not just related to her daughter. She also has reported Anxiety runs in the family. She does not think there is some trauma elements in her story despite her reports all the way from her DA.  Assessments Completed:  The following assessments were completed by patient for this visit:  PHQ2:   Phq2 (   1999 [employer],All Rights Reserved. Used With Permission. Developed By Clement Yi,Palak López,Nikhil Dos Santos And Colleagues,With An Educational Valente From [employer].)    4/14/2025  6:10 AM CDT - Filed by Patient 3/24/2025  2:55 PM CDT - Filed by Patient 12/3/2024 10:35 AM CST - Filed by Patient   The following " questionnaire should only be answered by the patient. Are you the patient? Yes Yes Yes   Over the last 2 weeks, how often have you been bothered by any of the following problems?      Q1: Little interest or pleasure in doing things More than half the days Several days Several days   Q2: Feeling down, depressed or hopeless More than half the days Several days Nearly every day   PHQ2 (   1999 [employer],ALL RIGHTS RESERVED. USED WITH PERMISSION. DEVELOPED BY YAAKOV DARBY,YEMI SAMPSON,SARAI WASHINGTON AND COLLEAGUES,WITH AN EDUCATIONAL CHRISTIE FROM [employer].)      PHQ-2 Score (range: 0 - 6) 4 2 4   The following questionnaire should only be answered by the patient. Are you the patient? Yes   Yes   Over the last 2 weeks, how often have you been bothered by any of the following problems?       1. Little interest or pleasure in doing things More than half the days   Several days   2.  Feeling down, depressed, or hopeless More than half the days   Nearly every day   3.  Trouble falling or staying asleep, or sleeping too much Several days   Several days   4.  Feeling tired or having little energy Several days   Several days   5.  Poor appetite or overeating Several days   Several days   6.  Feeling bad about yourself - or that you are a failure or have let yourself or your family down Nearly every day   Nearly every day   7.  Trouble concentrating on things, such as reading the newspaper or watching television Several days   Several days   8.  Moving or speaking so slowly that other people could have noticed. Or the opposite - being so fidgety or restless that you have been moving around a lot more than usual Several days   Not at all   9.  Thoughts that you would be better off dead, or of hurting yourself in some way Not at all   Not at all   If you checked off any problems, how difficult have these problems made it for you to do your work, take care of things at home, or get along with other people? Somewhat  difficult   Somewhat difficult   PHQ9 TOTAL SCORE (range: 0 - 27) 12 (Moderate depression)   11 (Moderate depression)     PHQ9:       8/23/2024    11:50 AM 9/17/2024    10:26 AM 11/2/2024     2:23 PM 12/3/2024    10:35 AM 1/2/2025     6:39 PM 1/30/2025     7:55 AM 4/14/2025     6:10 AM   PHQ-9 SCORE   PHQ-9 Total Score MyChart 7 (Mild depression) 8 (Mild depression) 7 (Mild depression) 11 (Moderate depression)  12 (Moderate depression) 12 (Moderate depression)   PHQ-9 Total Score 7 8 7  11  5 12  12        Patient-reported     GAD2:       10/30/2024     7:07 PM 11/18/2024     8:05 AM 12/3/2024    10:36 AM 1/30/2025     7:56 AM 2/13/2025     8:17 AM 3/24/2025     2:59 PM 4/14/2025     6:11 AM   PARIS-2   Feeling nervous, anxious, or on edge 3 3 3 3 3 2 3   Not being able to stop or control worrying 3 3 3 3 3 1 3   PARIS-2 Total Score 6  6  6  6  6  3  6        Patient-reported     GAD7:       11/18/2024     8:05 AM 12/3/2024    10:36 AM 1/2/2025     6:39 PM 1/30/2025     7:56 AM 2/13/2025     8:17 AM 3/24/2025     2:59 PM 4/14/2025     6:11 AM   PARIS-7 SCORE   Total Score 16 (severe anxiety) 17 (severe anxiety)  15 (severe anxiety) 17 (severe anxiety) 12 (moderate anxiety) 19 (severe anxiety)   Total Score 16  17  14 15  17  12  19        Patient-reported     CAGE-AID:       11/21/2023     8:11 AM 8/23/2024    11:53 AM 1/2/2025     6:39 PM   CAGE-AID Total Score   Total Score 0 0 0   Total Score MyChart  0 (A total score of 2 or greater is considered clinically significant)      PROMIS 10-Global Health (all questions and answers displayed):       11/21/2023     8:11 AM 4/15/2024     9:55 AM 8/23/2024    11:53 AM 11/2/2024     2:25 PM 12/3/2024    10:38 AM 1/2/2025     6:39 PM 1/30/2025     7:58 AM   PROMIS 10   In general, would you say your health is:  Good Fair Good Fair  Fair   In general, would you say your quality of life is:  Good Fair Good Fair  Fair   In general, how would you rate your physical health?  Good Fair  Good Fair  Fair   In general, how would you rate your mental health, including your mood and your ability to think?  Fair Fair Fair Fair  Fair   In general, how would you rate your satisfaction with your social activities and relationships?  Poor Poor Poor Poor  Poor   In general, please rate how well you carry out your usual social activities and roles  Fair Good Good Fair  Good   To what extent are you able to carry out your everyday physical activities such as walking, climbing stairs, carrying groceries, or moving a chair?  Completely Completely Completely Completely  Completely   In the past 7 days, how often have you been bothered by emotional problems such as feeling anxious, depressed, or irritable?  Often Always Always Always  Always   In the past 7 days, how would you rate your fatigue on average?  Mild Mild Mild Mild  Mild   In the past 7 days, how would you rate your pain on average, where 0 means no pain, and 10 means worst imaginable pain?  2 3 3 2  3   In general, would you say your health is: 3 3  2 3 2 3 2   In general, would you say your quality of life is: 3 3  2 3 2 3 2   In general, how would you rate your physical health? 3 3  2 3 2 3 2   In general, how would you rate your mental health, including your mood and your ability to think? 2 2  2 2 2 3 2   In general, how would you rate your satisfaction with your social activities and relationships? 1 1  1 1 1 3 1   In general, please rate how well you carry out your usual social activities and roles. (This includes activities at home, at work and in your community, and responsibilities as a parent, child, spouse, employee, friend, etc.) 3 2  3 3 2  3   To what extent are you able to carry out your everyday physical activities such as walking, climbing stairs, carrying groceries, or moving a chair? 5 5  5 5 5 3 5   In the past 7 days, how often have you been bothered by emotional problems such as feeling anxious, depressed, or irritable? 5 4  5 5 5  3 5   In the past 7 days, how would you rate your fatigue on average? 2 2  2 2 2 2 2   In the past 7 days, how would you rate your pain on average, where 0 means no pain, and 10 means worst imaginable pain? 2 2  3 3 2 0 3   Global Mental Health Score 7 8 6    6 7  6  12 6    Global Physical Health Score 16 16 15    15 16  15  15 15    PROMIS TOTAL - SUBSCORES 23 24 21    21 23  21  27 21        Patient-reported    Proxy-reported     [place] Suicide Severity Rating Scale (Lifetime/Recent)      11/21/2023     8:21 AM 8/23/2024    12:25 PM 1/30/2025     8:58 AM   [place] Suicide Severity Rating (Lifetime/Recent)   1. Wish to be Dead (Lifetime) N N N   2. Non-Specific Active Suicidal Thoughts (Lifetime) N N N   Actual Attempt (Lifetime) N N N   Has subject engaged in non-suicidal self-injurious behavior? (Lifetime) N N N   Interrupted Attempts (Lifetime) N N N   Aborted or Self-Interrupted Attempt (Lifetime) N N N   Preparatory Acts or Behavior (Lifetime) N N N   Calculated C-SSRS Risk Score (Lifetime/Recent) No Risk Indicated No Risk Indicated No Risk Indicated     Reason for Discharge:  Patient would like to try different approaches    Aftercare Plan:  Patient will be transitioning to a different provider to continue working on her anxiety and personal/family issues. She is also looking for a later hour due to her work.     Geraldo Chow, [facility]  April 28, 2025

## 2025-05-08 ENCOUNTER — MYC REFILL (OUTPATIENT)
Dept: PSYCHIATRY | Facility: CLINIC | Age: 55
End: 2025-05-08
Payer: COMMERCIAL

## 2025-05-08 DIAGNOSIS — F41.1 GENERALIZED ANXIETY DISORDER: ICD-10-CM

## 2025-05-08 DIAGNOSIS — F33.1 MODERATE EPISODE OF RECURRENT MAJOR DEPRESSIVE DISORDER (H): ICD-10-CM

## 2025-05-08 RX ORDER — SERTRALINE HYDROCHLORIDE 100 MG/1
100 TABLET, FILM COATED ORAL DAILY
Qty: 30 TABLET | Refills: 0 | Status: SHIPPED | OUTPATIENT
Start: 2025-05-08

## 2025-05-08 NOTE — TELEPHONE ENCOUNTER
Date of Last Office Visit: 01/30/2025  Date of Next Office Visit: 05/14/2025  No shows since last visit: No  More than one patient-initiated cancellation (with reschedule) since last seen in clinic? No    []Medication refilled per  Medication Refill in Ambulatory Care  policy.  [x]Scope of Practice: refill request processed by LPN/MA  []Medication unable to be refilled by RN due to criteria not met as indicated below:    []Eligibility: has not had a provider visit within last 6 months   []Supervision: no future appointment; < 7 days before next appointment   []Compliance: no shows; cancellations; lapse in therapy   []Verification: order discrepancy; may need modification...   [] > 30-day supply request   []Advanced refill request: > 7 days before refill date   []Controlled medication   []Medication not included in policy   []Review: new med; med adjusted <= 30 days; safety alert; requires lab monitoring...   []Other:      Medication(s) requested:     -  sertraline (ZOLOFT) 50 MG tablet   Date last ordered: 01/30/2025  Qty: 159  Refills: 0  Appropriate for refill? Provider to review.            Any Controlled Substance(s)? No      Requested medication(s) verified as identical to current order? Yes    Any lapse in adherence to medication(s) greater than 5 days? Yes     Additional action taken? cued up medication/order(s) and routed encounter to provider for review.      Last visit treatment plan:   Schedule an appointment with me in 2 month   Continue lorazepam/Ativan 0.5 mg daily as needed for severe anxiety/panic.  Continue to use sparingly.  Increase propranolol to 20-40 mg twice daily as needed for anxiety.  Can take about 60 minutes before anxiety provoking event.  Discontinue/taper Pristiq and start sertraline/Zoloft:  Days 1-7  Decrease Pristiq to 12.5 mg daily   Start sertraline 25 mg daily  Days 8-14  Decrease Pristiq by alternating 12.5 mg with no Pristiq every other day   Increase sertraline to 50 mg daily    Days 15-21  STOP Pristiq  Increase sertraline to 75 mg daily  Days 22+   Increase sertraline to 100 mg daily as tolerated    Any medication(s) require lab monitoring? No    Dawn Aggarwal MA on 5/8/2025 at 12:00 PM

## 2025-05-13 NOTE — PROGRESS NOTES
[facility] - [place]    Behavioral Health Clinician Progress Note   Mental Health & Addiction Services      25    Patient Name: Adela Stone       Service Type:  Individual   Service Location:  [employer] / Email (patient reached)   Visit Start Time: 750  Visit End Time:  8am  Appt was inturrupted by Pt having to bring her daughter to school  Session Length: 16 - 37    Attendees: Client   Service Modality: Video Visit:      Provider verified identity through the following two step process.  Patient provided:  Patient  and Patient address    Telemedicine Visit: The patient's condition can be safely assessed and treated via synchronous audio and visual telemedicine encounter.      Reason for Telemedicine Visit: Services only offered telehealth    Originating Site (Patient Location): Patient's home    Distant Site (Provider Location): Provider Remote Setting- Home Office    Consent:  The patient/guardian has verbally consented to: the potential risks and benefits of telemedicine (video visit) versus in person care; bill my insurance or make self-payment for services provided; and responsibility for payment of non-covered services.     Patient would like the video invitation sent by:  My Chart    Mode of Communication:  Video Conference via Amwell    Distant Location (Provider):  Off-site    As the provider I attest to compliance with applicable laws and regulations related to telemedicine.   Visit number: 2    [facility] Visit Activities (Refresh list every visit): [facility]     [place] Diagnostic Assessment: 24 Geraldo Chow [facility]  Treatment Plan Review Date: 25      DATA:    Extended Session (60+ minutes): No   Interactive Complexity: No   Crisis: No   [facility] Patient: No     Assessments completed prior to visit:   The following assessments were completed by patient for this visit:  PHQ9:       2024    11:50 AM 2024    10:26 AM 2024     2:23  PM 12/3/2024    10:35 AM 1/2/2025     6:39 PM 1/30/2025     7:55 AM 4/14/2025     6:10 AM   PHQ-9 SCORE   PHQ-9 Total Score [place] 7 (Mild depression) 8 (Mild depression) 7 (Mild depression) 11 (Moderate depression)  12 (Moderate depression) 12 (Moderate depression)   PHQ-9 Total Score 7 8 7  11  5 12  12        Patient-reported     GAD7:       11/18/2024     8:05 AM 12/3/2024    10:36 AM 1/2/2025     6:39 PM 1/30/2025     7:56 AM 2/13/2025     8:17 AM 3/24/2025     2:59 PM 4/14/2025     6:11 AM   PARIS-7 SCORE   Total Score 16 (severe anxiety) 17 (severe anxiety)  15 (severe anxiety) 17 (severe anxiety) 12 (moderate anxiety) 19 (severe anxiety)   Total Score 16  17  14 15  17  12  19        Patient-reported        Reason for Visit/Presenting Concern:  Anxiety    Current Stressors / Issues:  MH update:  Med transition was very rough.  3/28 was last day of pristiq.  6 weeks off.  High brain zaps.  Barely functional at this time.  Engaging in coping skills but feels minimally effective.   Stresses:  hx trauma.  Parents elderly and need care   in senior care, but .  3 kiddos  Appetite: n/a  Sleep: n/a  Outpatient Provider updates: Discharged.  Sent resources on DBT.  Unable to do a higher level of care due to schedule.  SI/SIB/HI:  n/a  NICKOLAS:  social ETOH  Side effects/compliance:  Interventions:  [facility] engaged in support of high interpersonal distress  Most important:  Not well.  Off Pristiq.  Never started Zoloft.  Taking OTC Gabba instead.   Unsure about MH medications.    1/30  MH update:  difficult week.  Stressors this week Adult daughter medial emergency-home and okay.   Cardiac concerns.  Not getting large benefit from med?  Maybe helps a bit? Wakes up anxious.  Panic attacks, using Ativan. Has had to go to ER for such.  Physical sx with anxiety then health anxiety. Mood down, compartmentalize to survive. Depressed.  Significant health anxiety/obsessional thoughts about health anxiety.  Other  phobia's  Stresses:  hx trauma.  Parents elderly and need care   in assisted, but .  3 kiddos  Appetite: n/a  Sleep: anxiety/stressors impacts  Outpatient Provider updates: [facility] family therapy  SI/SIB/HI:  Denies lifetime concern  NICKOLAS:  social ETOH  Side effects/compliance:  Interventions:  [facility] engaged in goal and sx exploration  Most important:  Trying to come off meds?  Get brain zaps if missed.  Other options?    Therapeutic Interventions:  Motivational Interviewing (MI): Validated patient's thoughts, feelings and experience. Expressed respect for patient's autonomy in decision making.  Asked open-ended questions to invite patient's self-reflection and self-direction around change and what is important for them in working towards their goals.     Response to treatment interventions:   Patient was receptive to interventions utilized.  Patient was engaged in the therapy process.       Progress on Treatment Objective(s) / Homework:   Satisfactory progress - CONTEMPLATION (Considering change and yet undecided); Intervened by assessing the negative and positive thinking (ambivalence) about behavior change     Medication Review:   Changes to psychiatric medications, see updated Medication List in EPIC.      Medication Compliance:   Yes     Chemical Use Review:  Substance Use: Chemical use reviewed, no active concerns identified      Tobacco Use: No current tobacco use.       Assessment: Current Emotional / Mental Status (status of significant symptoms):    Risk status (Self / Other harm or suicidal ideation)   Patient denies a history of suicidal ideation, suicide attempts, self-injurious behavior, homicidal ideation, homicidal behavior, and and other safety concerns   Patient denies current fears or concerns for personal safety.   Patient denies current or recent suicidal ideation or behaviors.   Patient denies current or recent homicidal ideation or behaviors.   Patient  denies current or recent self injurious behavior or ideation.   Patient denies other safety concerns.   Recommended that patient call 911 or go to the local ED should there be a change in any of these risk factors      ASSESSMENT:   Mental Status:     Appearance:   Appropriate    Eye Contact:   Good    Psychomotor Behavior: Normal    Attitude:   Cooperative    Orientation:   All   Speech Rate / Production: Normal    Volume:   Normal    Mood:    Normal   Affect:    Appropriate    Thought Content:  Clear    Thought Form:  Coherent  Logical    Insight:    Good          Diagnoses:      Generalized anxiety disorder  Moderate episode of recurrent major depressive disorder (H)  Panic disorder without agoraphobia    Collateral Reports Completed:   Communicated with: Dr Sandoval       Plan: (Homework, other):   Patient was provided No indications of CD issues  Patient was given information about behavioral services and encouraged to schedule a follow up appointment with the clinic [facility] as needed.         Amy Manning [facility], [facility]     _____________________________________________________________________________________________________________________________________                                              Individual Treatment Plan    Patient's Name: Adela Stone   YOB: 1970  Date of Creation: 1/30/25  Date Treatment Plan Last Reviewed/Revised: 5/14/25    DSM5 Diagnoses:    Generalized anxiety disorder  Panic disorder without agoraphobia  Moderate episode of recurrent major depressive disorder (H)    Psychosocial / Contextual Factors: Trauma History, Relationship Concerns, Interpersonal Concerns, and Caregiver  PROMIS (reviewed every 90 days):   The following assessments were completed by patient for this visit:  PROMIS 10-Global Health (only subscores and total score):       4/15/2024     9:55 AM 8/23/2024    11:53 AM 11/2/2024     2:25 PM 12/3/2024    10:38 AM 1/2/2025     6:39 PM 1/30/2025     7:58 AM  5/13/2025    11:48 PM   PROMIS-10 Scores Only   Global Mental Health Score 8 6    6 7  6  12 6  5    Global Physical Health Score 16 15    15 16  15  15 15  15    PROMIS TOTAL - SUBSCORES 24 21    21 23  21  27 21  20        Patient-reported        Referral / Collaboration:  Referral to another professional/service is not indicated at this time..    Anticipated number of session for this episode of care:  6-9 sessions  Anticipation frequency of session: Monthly  Anticipated Duration of each session: 16-37 minutes  Treatment plan will be reviewed in 90 days or when goals have been changed.       MeasurableTreatment Goal(s) related to diagnosis / functional impairment(s)  Goal 1: Patient will improve daily functioning.    I will know I've met my goal when I have less panic and anxiety s.      Status: Continued - Date(s):  5/14/25     Intervention(s)  [facility] will Motivational Interviewing (MI): Validate patient's thoughts, feelings and experience. Express respect for patient's autonomy in decision making.  Ask open-ended questions to invite patient's self-reflection and self-direction around change and what is important for them in working towards their goals.      Patient has reviewed and agreed to the above plan.     Written by  Amy Manning LPCC, C

## 2025-05-13 NOTE — PROGRESS NOTES
"Telemedicine Visit: The patient's condition can be safely assessed and treated via synchronous audio and visual telemedicine encounter.      Reason for Telemedicine Visit: Patient has requested telehealth visit    Originating Site (Patient Location): Patient's home    Distant Location (provider location):  Off-site    Consent:  The patient/guardian has verbally consented to: the potential risks and benefits of telemedicine (video visit) versus in person care; bill my insurance or make self-payment for services provided; and responsibility for payment of non-covered services.     Mode of Communication:  Video Conference via [employer]    As the provider I attest to compliance with applicable laws and regulations related to telemedicine.         Outpatient Psychiatric Progress Note    Name: Adela Stone   : 1970                    Primary Care Provider: Paty Honeycutt MD   Therapist: [facility] family therapy      PHQ-9 scores:      2025     6:39 PM 2025     7:55 AM 2025     6:10 AM   PHQ-9 SCORE   PHQ-9 Total Score MyChart  12 (Moderate depression) 12 (Moderate depression)   PHQ-9 Total Score 5 12  12        Patient-reported       PARIS-7 scores:      2025     8:17 AM 3/24/2025     2:59 PM 2025     6:11 AM   PARIS-7 SCORE   Total Score 17 (severe anxiety) 12 (moderate anxiety) 19 (severe anxiety)   Total Score 17  12  19        Patient-reported       Patient Identification:  Patient is a 54 year old,   White Not  or  female  who presents for return visit with me.  Patient is currently employed full time. Patient attended the phone/video session alone. Patient prefers to be called: \"Yana\".    Interim History:  Patient is a transfer of care today from Dr. Vikas Whittington. Dr. Whittington last saw Adela Stone for outpatient psychiatry Return Visit on 2024.     I last saw patient for return visit 2025. During that appointment, we:    Continue " lorazepam/Ativan 0.5 mg daily as needed for severe anxiety/panic.  Continue to use sparingly.  Increase propranolol to 20-40 mg twice daily as needed for anxiety.  Can take about 60 minutes before anxiety provoking event.  Discontinue/taper Pristiq and start sertraline/Zoloft:  Days 1-7  Decrease Pristiq to 12.5 mg daily   Start sertraline 25 mg daily  Days 8-14  Decrease Pristiq by alternating 12.5 mg with no Pristiq every other day   Increase sertraline to 50 mg daily   Days 15-21  STOP Pristiq  Increase sertraline to 75 mg daily  Days 22+   Increase sertraline to 100 mg daily as tolerated      5/14: Patient struggling quite a bit with anxiety and mood related symptoms.  Off Pristiq for about 1-1/2 months.  Struggling to function optimally at this time.  High stressors.  No longer in psychotherapy.  Not sure about starting another psychiatric medication.  Nervous about the difficulty to wean off another medication, especially since Pristiq so difficult to come off of.  Taking over-the-counter LUIS supplement.  Not sure it is very helpful.  No acute safety concerns.  No acute SI.  No problematic drug or alcohol use.  See [facility] note below for additional details.    Per [facility], Amy Manning [facility], during today's team-based visit:  Current Stressors / Issues:  MH update:  Med transition was very rough.  3/28 was last day of pristiq.  6 weeks off.  High brain zaps.  Barely functional at this time.  Engaging in coping skills but feels minimally effective.   Stresses:  hx trauma.  Parents elderly and need care   in prison, but .  3 kiddos  Appetite: n/a  Sleep: n/a  Outpatient Provider updates: Discharged.  Sent resources on DBT.  Unable to do a higher level of care due to schedule.  SI/SIB/HI:  n/a  NICKOLAS:  social ETOH  Side effects/compliance:  Interventions:  [facility] engaged in support of high interpersonal distress  Most important:  Not well.  Off Pristiq.  Never started Zoloft.  Taking OTC Gabba instead.    Unsure about  medications.    Past Psychiatric Med Trials:  At first visit with me 1/29/25:  Pristiq 25 mg daily  Ativan 0.5 mg daily prn severe anxiety - last Rx sent May 2024 x 30 tabs  Propranolol 20 mg twice daily as needed for anxiety    Past trials:  Citalopram - rashes  Paroxetine - in late her 20s - worked very well but concern over long term  Desvenlafaxine  Lorazepam- effective and taken sparingly    Psychiatric ROS:  See HPI above for all pertinent positives and negatives. Rest of systems negative.     PHQ9 and GAD7 scores were reviewed today if completed.   Medication side effects: Not currently taking psychiatric medications  Current stressors include: Symptoms and see HPI above  Coping mechanisms and supports include: Therapy, Hobbies, and Friends    Current medications include:   Current Outpatient Medications   Medication Sig Dispense Refill    aspirin 81 MG EC tablet Take 1 tablet (81 mg) by mouth daily. 90 tablet 3    desvenlafaxine succinate (PRISTIQ) 25 MG 24 hr tablet Take 1 tablet (25 mg) by mouth daily. 30 tablet 2    lisinopril (ZESTRIL) 10 MG tablet Take 1 tablet (10 mg) by mouth daily. 90 tablet 3    LORazepam (ATIVAN) 0.5 MG tablet Take one daily as needed for severe anxiety 30 tablet 1    propranolol (INDERAL) 20 MG tablet Take one tablet twice a day as needed for anxiety. Take 30-60 minutes ahead of an anxiety provoking event. 30 tablet 2    rosuvastatin (CRESTOR) 40 MG tablet Take 1 tablet (40 mg) by mouth daily. 90 tablet 3    sertraline (ZOLOFT) 100 MG tablet Take 1 tablet (100 mg) by mouth daily. NEED APPT FOR REFILLS 30 tablet 0     No current facility-administered medications for this visit.       The [place] Prescription Monitoring Program has been reviewed and there are no concerns about diversionary activity for controlled substances at this time.   01/30/202501/30/20251  Lorazepam 0.5 Mg Tablet  30.0030Al [id number] (8638)0/10.50 [place]  [place]/14/202405/14/20241  Lorazepam 0.5 Mg Tablet  30.0030Mi [id number] (7383)0/00.50 [place]     Past Medical/Surgical History:  Past Medical History:   Diagnosis Date    Anxiety     Palpitations 05/24/2024    Created by Conversion  Formatting of this note might be different from the original.   Created by Conversion      Postmenopausal bleeding 03/14/2024    Vitamin D deficiency 05/24/2024    Created by Conversion  Replacement Utility updated for latest IMO load  Formatting of this note might be different from the original.   Created by Conversion      Replacement Utility updated for latest IMO load        has a past medical history of Anxiety, Palpitations (05/24/2024), Postmenopausal bleeding (03/14/2024), and Vitamin D deficiency (05/24/2024).    Social History:  Reviewed. No changes to social history except as noted above in HPI.    Vital Signs:   None. This is phone/video visit.     Labs:  Most recent laboratory results reviewed and no new labs.     [employer] testing from 2018 reviewed    Review of Systems:  10 systems (general, cardiovascular, respiratory, eyes, ENT, endocrine, GI, , M/S, neurological) were reviewed. Most pertinent finding(s) is/are:  denies fever, cough, persistent headaches, shortness of breath, chest pain, severe GI symptoms, trouble urinating, severe pain. The remaining systems are all unremarkable.    Mental Status Examination (limited as this is by phone/video):  Appearance: Awake, alert, appears stated age, no acute distress, well-groomed   Attitude:  cooperative, pleasant   Motor: No gross abnormalities observed via video, not formally tested   Oriented to:  person, place, time, and situation  Attention Span and Concentration:  normal  Speech:  clear, coherent, regular rate, rhythm, and volume  Language: intact  Mood:  anxious, down  Affect: Tearful throughout interview  Associations:  no loose associations  Thought Process:  logical, linear and goal oriented  Thought  Content:  no evidence of suicidal ideation or homicidal ideation, no evidence of psychotic thought, no auditory hallucinations present and no visual hallucinations present  Recent and Remote Memory:  Intact to interview. Not formally assessed. No amnesia.  Fund of Knowledge: appropriate  Insight:  good  Judgment:  intact, adequate for safety  Impulse Control:  intact    Suicide Risk Assessment:  Today Adela Stone reports no suicidal ideation. Based on all available evidence including the factors cited above, Adela Stone does not appear to be at imminent risk for self-harm, does not meet criteria for a 72-hr hold, and therefore remains appropriate for ongoing outpatient level of care.  A thorough assessment of risk factors related to suicide and self-harm have been reviewed and are noted above. The patient convincingly denies suicidality on several occasions. Local community safety resources reviewed for patient to use if needed. There was no deceit detected, and the patient presented in a manner that was believable.     DSM5 Diagnosis:  296.32 (F33.1) Major Depressive Disorder, Recurrent Episode, Moderate _  300.02 (F41.1) Generalized Anxiety Disorder  CYP2D6 poor metabolizer    Medical comorbidities include:   Patient Active Problem List    Diagnosis Date Noted    CAD (coronary artery disease) 11/15/2024     Priority: Medium    Acquired postural kyphosis 09/17/2024     Priority: Medium    Decreased hearing of both ears 09/17/2024     Priority: Medium    Difficulty with family 09/17/2024     Priority: Medium    Mixed hyperlipidemia 09/17/2024     Priority: Medium    Rosacea 05/24/2024     Priority: Medium     Created by Conversion  Formatting of this note might be different from the original.   Created by Conversion      Suspected sleep apnea 05/24/2024     Priority: Medium    Overweight with body mass index (BMI) of 29 to 29.9 in adult 05/24/2024     Priority: Medium    Health care maintenance 09/12/2023      Priority: Medium    Primary hypertension 09/12/2023     Priority: Medium    Social anxiety disorder 07/30/2018     Priority: Medium       Psychosocial & Contextual Factors: see HPI above    Assessment:  From most recent follow-up with Dr. Whittington, 8/22/2024:  Patient dealing with generalized anxiety symptoms, history of panic. Symptoms are in partial remission with Pristiq. Generally medication management has had efficacy, does prefer a holistic approach to treatment. Discussed risks and benefits of medication management versus general holistic treatment. Referred for integrated medicine. Add buspirone as potential replacement for Pristiq and bridge to holistic intervention if an effective treatment is found.     Assessment  The patient's generalized anxiety disorder appears to be stable at this time. She has expressed a desire to wean off her current medication, but due to multiple ongoing issues, she is not ready to make this change at the moment. She is using a tool to help manage her anxiety, which she has found helpful. The patient also experiences social anxiety, which leads her to avoid certain situations and interactions with people.    1/30/2025:  Patient with ongoing significant anxiety impacting quality of life and day-to-day functioning.  High psychosocial stressors.  Encouraged to continue in psychotherapy.  Discussed transition to sertraline in lieu of the Pristiq.  Patient might wait 2-4 weeks due to some impending stressors before making the transition.  Intermittent lorazepam use will be continued.  No acute safety concerns.  No suicidality.  No problematic drug or alcohol use.    5/14/2025:  Patient unfortunately struggling quite a bit with anxiety and depression symptoms.  Quite tearful throughout interview.  Remains a little hesitant about mental health medications but is agreeable to starting a trial with sertraline.  Mental health symptoms impacting quality of life and ability to function  optimally at work and home, despite individual psychotherapy.  No acute safety concerns.  No acute suicidality.  No problematic drug or alcohol use.    Medication side effects and alternatives were reviewed. Health promotion activities recommended and reviewed today. All questions addressed. Education and counseling completed regarding risks and benefits of medications and psychotherapy options. Recommend therapy for additional support.     Treatment Plan:  Continue lorazepam/Ativan 0.5 mg daily as needed for severe anxiety/panic.  Continue to use sparingly.  Continue propranolol 20-40 mg twice daily as needed for anxiety.  Can take about 60 minutes before anxiety provoking event.  Start sertraline/Zoloft:  Days 1-10  Start sertraline 25 mg daily  Days 11-20  Increase sertraline to 50 mg daily   Days 21-30  Increase sertraline to 100 mg daily  Continue all other cares per primary care provider.   Continue all other medications as reviewed per electronic medical record today.   Safety plan reviewed. To the Emergency Department as needed or call after hours crisis line at [phone] or [phone]. [place] Crisis Text Line. Text [place] to 518924 or Suicide LifeLine Chat: suicidepreventionlifeline.org/chat  Recommend psychotherapy for additional support and ongoing development of coping skills and strategies.  Schedule an appointment with me in 4-6 weeks or sooner as needed. Call [facility] at [phone] to schedule.  Follow up with primary care provider as planned or for acute medical concerns.  Call the psychiatric nurse line with medication questions or concerns at [phone].  MyChart may be used to communicate with your provider, but this is not intended to be used for emergencies.    Risks of benzodiazepine (Ativan, Xanax, Klonopin, Valium, etc) use including, but not limited to, sedation, tolerance, risk for addiction/dependence. Do not drink alcohol while taking benzodiazepines due to risk  of trouble breathing and potential death. Do not drive or operate heavy machinery until it is known how the drug affects you. Discuss with physician or pharmacist before ever taking a benzodiazepine with a narcotic/opioid pain medication.     Administrative Billing:   Phone Call/Video Duration: 20 Minutes  Start: 8:06a  Stop: 8:26a    The longitudinal plan of care for the diagnosis(es)/condition(s) as documented were addressed during this visit. Due to the added complexity in care, I will continue to support Yana in the subsequent management and with ongoing continuity of care.    Episode of Care #: 6    Patient Status:  Patient will continue to be seen for ongoing consultation and stabilization.    Signed:   Annabelle Sandoval DO  [facility]    Disclaimer: This note consists of symbols derived from keyboarding, dictation and/or voice recognition software. As a result, there may be errors in the script that have gone undetected. Please consider this when interpreting information found in this chart.

## 2025-05-14 ENCOUNTER — VIRTUAL VISIT (OUTPATIENT)
Dept: BEHAVIORAL HEALTH | Facility: CLINIC | Age: 55
End: 2025-05-14
Payer: COMMERCIAL

## 2025-05-14 ENCOUNTER — VIRTUAL VISIT (OUTPATIENT)
Dept: PSYCHIATRY | Facility: CLINIC | Age: 55
End: 2025-05-14
Payer: COMMERCIAL

## 2025-05-14 DIAGNOSIS — F33.1 MODERATE EPISODE OF RECURRENT MAJOR DEPRESSIVE DISORDER (H): ICD-10-CM

## 2025-05-14 DIAGNOSIS — F41.1 GENERALIZED ANXIETY DISORDER: Primary | ICD-10-CM

## 2025-05-14 DIAGNOSIS — F41.0 PANIC DISORDER WITHOUT AGORAPHOBIA: ICD-10-CM

## 2025-05-14 DIAGNOSIS — F41.1 GENERALIZED ANXIETY DISORDER: ICD-10-CM

## 2025-05-14 PROCEDURE — G2211 COMPLEX E/M VISIT ADD ON: HCPCS | Performed by: PSYCHIATRY & NEUROLOGY

## 2025-05-14 PROCEDURE — 99207 PR NO BILLABLE SERVICE THIS VISIT: CPT | Mod: 95 | Performed by: COUNSELOR

## 2025-05-14 PROCEDURE — 98006 SYNCH AUDIO-VIDEO EST MOD 30: CPT | Performed by: PSYCHIATRY & NEUROLOGY

## 2025-05-14 RX ORDER — SERTRALINE HYDROCHLORIDE 100 MG/1
TABLET, FILM COATED ORAL
Qty: 55 TABLET | Refills: 0 | Status: SHIPPED | OUTPATIENT
Start: 2025-05-24 | End: 2025-07-23

## 2025-05-14 RX ORDER — SERTRALINE HYDROCHLORIDE 25 MG/1
25 TABLET, FILM COATED ORAL DAILY
Qty: 10 TABLET | Refills: 0 | Status: SHIPPED | OUTPATIENT
Start: 2025-05-14

## 2025-05-14 NOTE — NURSING NOTE
Current patient location: [address] [place] [address]    Is the patient currently in the state of [place]? YES    Visit mode: VIDEO    If the visit is dropped, the patient can be reconnected by:VIDEO VISIT: Text to cell phone:   Telephone Information:   Mobile [phone]       Will anyone else be joining the visit? NO  (If patient encounters technical issues they should call [phone]    Are changes needed to the allergy or medication list? Pt stated no changes to allergies and Pt stated no med changes    Are refills needed on medications prescribed by this physician? Discuss with provider    Rooming Documentation:  Questionnaire(s) completed    Reason for visit: RECHECK    Pam PORTER

## 2025-05-14 NOTE — PATIENT INSTRUCTIONS
Treatment Plan:  Continue lorazepam/Ativan 0.5 mg daily as needed for severe anxiety/panic.  Continue to use sparingly.  Continue propranolol 20-40 mg twice daily as needed for anxiety.  Can take about 60 minutes before anxiety provoking event.  Start sertraline/Zoloft:  Days 1-10  Start sertraline 25 mg daily  Days 11-20  Increase sertraline to 50 mg daily   Days 21-30  Increase sertraline to 100 mg daily  Continue all other cares per primary care provider.   Continue all other medications as reviewed per electronic medical record today.   Safety plan reviewed. To the Emergency Department as needed or call after hours crisis line at [phone] or [phone]. [place] Crisis Text Line. Text [place] to 465055 or Suicide LifeLine Chat: suicidepreventionlifeline.org/chat  Recommend psychotherapy for additional support and ongoing development of coping skills and strategies.  Schedule an appointment with me in 4-6 weeks or sooner as needed. Call [facility] at [phone] to schedule.  Follow up with primary care provider as planned or for acute medical concerns.  Call the psychiatric nurse line with medication questions or concerns at [phone].  MyChart may be used to communicate with your provider, but this is not intended to be used for emergencies.    Risks of benzodiazepine (Ativan, Xanax, Klonopin, Valium, etc) use including, but not limited to, sedation, tolerance, risk for addiction/dependence. Do not drink alcohol while taking benzodiazepines due to risk of trouble breathing and potential death. Do not drive or operate heavy machinery until it is known how the drug affects you. Discuss with physician or pharmacist before ever taking a benzodiazepine with a narcotic/opioid pain medication.     Patient Education   Collaborative Care Psychiatry Service  What to Expect  Here's what to expect from your Collaborative Care Psychiatry Service (CCPS).   About CCPS  CCPS means 2 people work together  "to help you get better. You'll meet with a behavioral health clinician and a psychiatric doctor. A behavioral health clinician helps people with mental health problems by talking with them. A psychiatric doctor helps people by giving them medicine.  How it works  At every visit, you'll see the behavioral health clinician (BHC) first. They'll talk with you about how you're doing and teach you how to feel better.   Then you'll see the psychiatric doctor. This doctor can help you deal with troubling thoughts and feelings by giving you medicine. They'll make sure you know the plan for your care.   CCPS usually takes 3 to 6 visits. If you need more visits, we may have you start seeing a different psychiatric doctor for ongoing care.  If you have any questions or concerns, we'll be glad to talk with you.  About visits  Be open  At your visits, please talk openly about your problems. It may feel hard, but it's the best way for us to help you.  Cancelling visits  If you can't come to your visit, please call us right away at [phone]. If you don't cancel at least 24 hours (1 full day) before your visit, that's \"late cancellation.\"  Being late to visits  Being very late is the same as not showing up. You will be a \"no show\" if:  Your appointment starts with a BHC, and you're more than 15 minutes late for a 30-minute (half hour) visit. This will also cancel your appointment with the psychiatric doctor.  Your appointment is with a psychiatric doctor only, and you're more than 15 minutes late for a 30-minute (half hour) visit.  Your appointment is with a psychiatric doctor only, and you're more than 30 minutes late for a 60-minute (full hour) visit.  If you cancel late or don't show up 2 times within 6 months, we may end your care.   Getting help between visits  If you need help between visits, you can call us Monday to Friday from 8 a.m. to 4:30 p.m. at [phone].  Emergency care  Call 911 or go to the nearest " emergency department if your life or someone else's life is in danger.  Call 988 anytime to reach the national Suicide and Crisis hotline.  Medicine refills  To refill your medicine, call your pharmacy. You can also call [facility]'s [facility] at [phone], Monday to Friday, 8 a.m. to 4:30 p.m. It can take 1 to 3 business days to get a refill.   Forms, letters, and tests  You may have papers to fill out, like FMLA, short-term disability, and workability. We can help you with these forms at your visits, but you must have an appointment. You may need more than 1 visit for this, to be in an intensive therapy program, or both.  Before we can give you medicine for ADHD, we may refer you to get tested for it or confirm it another way.  We may not be able to give you an emotional support animal letter.  We don't do mental health checks ordered by the court.   We don't do mental health testing, but we can refer you to get tested.   Thank you for choosing us for your care.  For informational purposes only. Not to replace the advice of your health care provider. Copyright   2022 [facility]. All rights reserved. [employer] [id number] - Rev 11/24.

## 2025-06-25 ENCOUNTER — TELEPHONE (OUTPATIENT)
Dept: FAMILY MEDICINE | Facility: CLINIC | Age: 55
End: 2025-06-25

## 2025-07-21 ENCOUNTER — MYC REFILL (OUTPATIENT)
Dept: PSYCHIATRY | Facility: CLINIC | Age: 55
End: 2025-07-21
Payer: COMMERCIAL

## 2025-07-21 DIAGNOSIS — F33.1 MODERATE EPISODE OF RECURRENT MAJOR DEPRESSIVE DISORDER (H): ICD-10-CM

## 2025-07-21 DIAGNOSIS — F41.1 GENERALIZED ANXIETY DISORDER: ICD-10-CM

## 2025-07-21 RX ORDER — SERTRALINE HYDROCHLORIDE 100 MG/1
100 TABLET, FILM COATED ORAL DAILY
Qty: 30 TABLET | Refills: 1 | Status: SHIPPED | OUTPATIENT
Start: 2025-07-21

## 2025-07-21 NOTE — TELEPHONE ENCOUNTER
Date of Last Office Visit: 05/14/2025  Date of Next Office Visit: None; routing for [facility] to assist pt with scheduling.    No shows since last visit: No  More than one patient-initiated cancellation (with reschedule) since last seen in clinic? No    []Medication refilled per  Medication Refill in Ambulatory Care  policy.  [x]Scope of Practice: refill request processed by LPN/MA  []Medication unable to be refilled by RN due to criteria not met as indicated below:    []Eligibility: has not had a provider visit within last 6 months   [x]Supervision: no future appointment; < 7 days before next appointment   []Compliance: no shows; cancellations; lapse in therapy   []Verification: order discrepancy; may need modification...   [] > 30-day supply request   []Advanced refill request: > 7 days before refill date   []Controlled medication   []Medication not included in policy   []Review: new med; med adjusted <= 30 days; safety alert; requires lab monitoring...   []Other:      Medication(s) requested:     -  sertraline (ZOLOFT) 100 MG tablet   Date last ordered: 05/24/2025  Qty: 55  Refills: 0  Appropriate for refill? Provider to review.            Any Controlled Substance(s)? No      Requested medication(s) verified as identical to current order? Yes    Any lapse in adherence to medication(s) greater than 5 days? No      Additional action taken? routed encounter to [facility] to assist pt with scheduling an appointment, cued up medication/order(s), and routed encounter to provider for review.      Last visit treatment plan:   Return in about 1 month (around 6/14/2025).   Continue lorazepam/Ativan 0.5 mg daily as needed for severe anxiety/panic.  Continue to use sparingly.  Continue propranolol 20-40 mg twice daily as needed for anxiety.  Can take about 60 minutes before anxiety provoking event.  Start sertraline/Zoloft:  Days 1-10  Start sertraline 25 mg daily  Days 11-20  Increase sertraline to 50 mg daily   Days 21-30  Increase  sertraline to 100 mg daily    Is lab monitoring required? No    Dawn Aggarwal MA on 7/21/2025 at 3:09 PM